# Patient Record
Sex: FEMALE | Race: WHITE | Employment: OTHER | URBAN - METROPOLITAN AREA
[De-identification: names, ages, dates, MRNs, and addresses within clinical notes are randomized per-mention and may not be internally consistent; named-entity substitution may affect disease eponyms.]

---

## 2017-01-27 ENCOUNTER — TELEPHONE (OUTPATIENT)
Dept: INTERNAL MEDICINE CLINIC | Facility: CLINIC | Age: 62
End: 2017-01-27

## 2017-01-27 RX ORDER — VENLAFAXINE HYDROCHLORIDE 37.5 MG/1
CAPSULE, EXTENDED RELEASE ORAL
Qty: 90 CAPSULE | Refills: 0 | Status: SHIPPED | OUTPATIENT
Start: 2017-01-27 | End: 2017-11-20

## 2017-01-27 RX ORDER — LEVOTHYROXINE SODIUM 0.05 MG/1
TABLET ORAL
Qty: 90 TABLET | Refills: 0 | Status: SHIPPED | OUTPATIENT
Start: 2017-01-27 | End: 2017-05-30 | Stop reason: ALTCHOICE

## 2017-01-27 RX ORDER — VENLAFAXINE HYDROCHLORIDE 75 MG/1
CAPSULE, EXTENDED RELEASE ORAL
Qty: 90 CAPSULE | Refills: 0 | Status: SHIPPED | OUTPATIENT
Start: 2017-01-27 | End: 2017-11-20

## 2017-01-27 RX ORDER — METOPROLOL TARTRATE 50 MG/1
TABLET, FILM COATED ORAL
Qty: 90 TABLET | Refills: 0 | Status: SHIPPED | OUTPATIENT
Start: 2017-01-27 | End: 2017-08-12

## 2017-02-02 NOTE — TELEPHONE ENCOUNTER
Letter mailed to pt tcb to review the PCP to be changed to TB.   Letter included reminder to do the labs ordered 11/2016

## 2017-05-01 RX ORDER — LEVOTHYROXINE SODIUM 0.05 MG/1
TABLET ORAL
Qty: 90 TABLET | Refills: 0 | OUTPATIENT
Start: 2017-05-01

## 2017-05-01 NOTE — TELEPHONE ENCOUNTER
Per protocol; failed - route to provider   Patient's last TSH abnormal range   Orders to recheck have been placed.

## 2017-05-30 ENCOUNTER — OFFICE VISIT (OUTPATIENT)
Dept: INTERNAL MEDICINE CLINIC | Facility: CLINIC | Age: 62
End: 2017-05-30

## 2017-05-30 VITALS
RESPIRATION RATE: 16 BRPM | TEMPERATURE: 98 F | HEART RATE: 64 BPM | BODY MASS INDEX: 31.34 KG/M2 | HEIGHT: 66 IN | DIASTOLIC BLOOD PRESSURE: 72 MMHG | WEIGHT: 195 LBS | SYSTOLIC BLOOD PRESSURE: 120 MMHG

## 2017-05-30 DIAGNOSIS — R05.9 COUGH: ICD-10-CM

## 2017-05-30 DIAGNOSIS — J06.9 URI, ACUTE: Primary | ICD-10-CM

## 2017-05-30 PROCEDURE — 99213 OFFICE O/P EST LOW 20 MIN: CPT | Performed by: INTERNAL MEDICINE

## 2017-05-30 RX ORDER — CODEINE PHOSPHATE AND GUAIFENESIN 10; 100 MG/5ML; MG/5ML
10 SOLUTION ORAL NIGHTLY PRN
Qty: 236 ML | Refills: 0 | Status: SHIPPED | OUTPATIENT
Start: 2017-05-30 | End: 2017-06-09

## 2017-05-30 RX ORDER — AZITHROMYCIN 250 MG/1
TABLET, FILM COATED ORAL
Qty: 6 TABLET | Refills: 0 | Status: SHIPPED | OUTPATIENT
Start: 2017-05-30 | End: 2017-11-20

## 2017-05-30 NOTE — PROGRESS NOTES
Nehemias Page is a 57year old female.   Patient presents with:  Cough: x 2 weeks ago pt. stated throat hurts when coughing      HPI:     Patient c/o cough, congestion, throat (muscle pain from coughing) for 2 weeks now. +allergies (sees allergist and go Aspirin 81 MG Oral Tab Take 81 mg by mouth daily.  Disp:  Rfl:       Past Medical History   Diagnosis Date   • Obesity    • Menopausal symptoms    • Depression      resolved   • Plantar fasciitis      left   • Inguinal hernia 1998     OR   • HTN (hyperten Wt 195 lb  BMI 31.49 kg/m2  GENERAL: well developed, well nourished,in no apparent distress  SKIN: no rashes,no suspicious lesions  HEENT: atraumatic, normocephalic,TMs and OP +erythema  NECK: supple,no adenopathy  LUNGS: normal rate without respiratory di

## 2017-07-24 RX ORDER — NAPROXEN 500 MG/1
TABLET ORAL
Qty: 60 TABLET | Refills: 0 | Status: SHIPPED | OUTPATIENT
Start: 2017-07-24 | End: 2020-10-14

## 2017-08-14 RX ORDER — METOPROLOL TARTRATE 50 MG/1
TABLET, FILM COATED ORAL
Qty: 90 TABLET | Refills: 0 | Status: SHIPPED | OUTPATIENT
Start: 2017-08-14 | End: 2017-11-20

## 2017-10-17 ENCOUNTER — TELEPHONE (OUTPATIENT)
Dept: INTERNAL MEDICINE CLINIC | Facility: CLINIC | Age: 62
End: 2017-10-17

## 2017-10-17 DIAGNOSIS — Z13.220 SCREENING FOR LIPOID DISORDERS: ICD-10-CM

## 2017-10-17 DIAGNOSIS — Z13.29 SCREENING FOR ENDOCRINE, NUTRITIONAL, METABOLIC AND IMMUNITY DISORDER: ICD-10-CM

## 2017-10-17 DIAGNOSIS — Z00.00 ROUTINE GENERAL MEDICAL EXAMINATION AT A HEALTH CARE FACILITY: ICD-10-CM

## 2017-10-17 DIAGNOSIS — Z13.0 SCREENING FOR BLOOD DISEASE: ICD-10-CM

## 2017-10-17 DIAGNOSIS — Z13.0 SCREENING FOR ENDOCRINE, NUTRITIONAL, METABOLIC AND IMMUNITY DISORDER: ICD-10-CM

## 2017-10-17 DIAGNOSIS — Z13.228 SCREENING FOR ENDOCRINE, NUTRITIONAL, METABOLIC AND IMMUNITY DISORDER: ICD-10-CM

## 2017-10-17 DIAGNOSIS — Z13.21 SCREENING FOR ENDOCRINE, NUTRITIONAL, METABOLIC AND IMMUNITY DISORDER: ICD-10-CM

## 2017-10-17 DIAGNOSIS — Z12.39 SCREENING FOR MALIGNANT NEOPLASM OF BREAST: Primary | ICD-10-CM

## 2017-10-17 DIAGNOSIS — Z13.29 SCREENING FOR THYROID DISORDER: ICD-10-CM

## 2017-10-17 NOTE — TELEPHONE ENCOUNTER
Pt also needs a mammo order. No call back is required. Pt will call CS in a few days from now to schedule.

## 2017-10-17 NOTE — TELEPHONE ENCOUNTER
CPE Future Appointments  Date Time Provider Radha Willoughby   11/20/2017 9:30 AM Wagner Rios MD [facility]     Orders to  Katiana Grad  aware must fast no call back required

## 2017-10-18 ENCOUNTER — IMMUNIZATION (OUTPATIENT)
Dept: INTERNAL MEDICINE CLINIC | Facility: CLINIC | Age: 62
End: 2017-10-18

## 2017-10-18 DIAGNOSIS — Z23 NEED FOR VACCINATION: ICD-10-CM

## 2017-10-18 PROCEDURE — 90471 IMMUNIZATION ADMIN: CPT | Performed by: INTERNAL MEDICINE

## 2017-10-18 PROCEDURE — 90686 IIV4 VACC NO PRSV 0.5 ML IM: CPT | Performed by: INTERNAL MEDICINE

## 2017-11-13 ENCOUNTER — LAB ENCOUNTER (OUTPATIENT)
Dept: LAB | Age: 62
End: 2017-11-13
Attending: INTERNAL MEDICINE
Payer: COMMERCIAL

## 2017-11-13 DIAGNOSIS — Z13.21 SCREENING FOR ENDOCRINE, NUTRITIONAL, METABOLIC AND IMMUNITY DISORDER: ICD-10-CM

## 2017-11-13 DIAGNOSIS — Z13.29 SCREENING FOR ENDOCRINE, NUTRITIONAL, METABOLIC AND IMMUNITY DISORDER: ICD-10-CM

## 2017-11-13 DIAGNOSIS — Z13.220 SCREENING FOR LIPOID DISORDERS: ICD-10-CM

## 2017-11-13 DIAGNOSIS — Z13.29 SCREENING FOR THYROID DISORDER: ICD-10-CM

## 2017-11-13 DIAGNOSIS — Z00.00 ROUTINE GENERAL MEDICAL EXAMINATION AT A HEALTH CARE FACILITY: ICD-10-CM

## 2017-11-13 DIAGNOSIS — Z13.228 SCREENING FOR ENDOCRINE, NUTRITIONAL, METABOLIC AND IMMUNITY DISORDER: ICD-10-CM

## 2017-11-13 DIAGNOSIS — Z13.0 SCREENING FOR ENDOCRINE, NUTRITIONAL, METABOLIC AND IMMUNITY DISORDER: ICD-10-CM

## 2017-11-13 DIAGNOSIS — Z13.0 SCREENING FOR BLOOD DISEASE: ICD-10-CM

## 2017-11-13 PROCEDURE — 84443 ASSAY THYROID STIM HORMONE: CPT

## 2017-11-13 PROCEDURE — 80061 LIPID PANEL: CPT

## 2017-11-13 PROCEDURE — 80053 COMPREHEN METABOLIC PANEL: CPT

## 2017-11-13 PROCEDURE — 85025 COMPLETE CBC W/AUTO DIFF WBC: CPT

## 2017-11-16 ENCOUNTER — HOSPITAL ENCOUNTER (OUTPATIENT)
Dept: MAMMOGRAPHY | Age: 62
Discharge: HOME | End: 2017-11-16
Attending: INTERNAL MEDICINE
Payer: COMMERCIAL

## 2017-11-16 DIAGNOSIS — Z12.39 SCREENING FOR MALIGNANT NEOPLASM OF BREAST: ICD-10-CM

## 2017-11-16 PROCEDURE — 77067 SCR MAMMO BI INCL CAD: CPT | Performed by: INTERNAL MEDICINE

## 2017-11-20 ENCOUNTER — OFFICE VISIT (OUTPATIENT)
Dept: INTERNAL MEDICINE CLINIC | Facility: CLINIC | Age: 62
End: 2017-11-20

## 2017-11-20 VITALS
RESPIRATION RATE: 16 BRPM | HEIGHT: 66 IN | WEIGHT: 187 LBS | HEART RATE: 68 BPM | TEMPERATURE: 98 F | SYSTOLIC BLOOD PRESSURE: 124 MMHG | DIASTOLIC BLOOD PRESSURE: 70 MMHG | BODY MASS INDEX: 30.05 KG/M2

## 2017-11-20 DIAGNOSIS — E03.9 HYPOTHYROIDISM, UNSPECIFIED TYPE: ICD-10-CM

## 2017-11-20 DIAGNOSIS — R21 RASH: ICD-10-CM

## 2017-11-20 DIAGNOSIS — N39.3 STRESS INCONTINENCE: ICD-10-CM

## 2017-11-20 DIAGNOSIS — R23.2 HOT FLASHES: ICD-10-CM

## 2017-11-20 DIAGNOSIS — Z12.4 SCREENING FOR MALIGNANT NEOPLASM OF CERVIX: ICD-10-CM

## 2017-11-20 DIAGNOSIS — I10 ESSENTIAL HYPERTENSION: ICD-10-CM

## 2017-11-20 DIAGNOSIS — E78.5 DYSLIPIDEMIA: ICD-10-CM

## 2017-11-20 DIAGNOSIS — Z00.00 ROUTINE GENERAL MEDICAL EXAMINATION AT A HEALTH CARE FACILITY: Primary | ICD-10-CM

## 2017-11-20 DIAGNOSIS — Z11.51 SCREENING FOR HPV (HUMAN PAPILLOMAVIRUS): ICD-10-CM

## 2017-11-20 DIAGNOSIS — Z88.9 MULTIPLE ALLERGIES: ICD-10-CM

## 2017-11-20 PROCEDURE — 87624 HPV HI-RISK TYP POOLED RSLT: CPT | Performed by: INTERNAL MEDICINE

## 2017-11-20 PROCEDURE — 88175 CYTOPATH C/V AUTO FLUID REDO: CPT | Performed by: INTERNAL MEDICINE

## 2017-11-20 PROCEDURE — 99396 PREV VISIT EST AGE 40-64: CPT | Performed by: INTERNAL MEDICINE

## 2017-11-20 RX ORDER — VENLAFAXINE HYDROCHLORIDE 37.5 MG/1
37.5 CAPSULE, EXTENDED RELEASE ORAL DAILY
Qty: 90 CAPSULE | Refills: 3 | Status: SHIPPED | OUTPATIENT
Start: 2017-11-20 | End: 2018-11-07 | Stop reason: DRUGHIGH

## 2017-11-20 RX ORDER — LEVOTHYROXINE SODIUM 0.07 MG/1
75 TABLET ORAL
Qty: 90 TABLET | Refills: 3 | Status: SHIPPED | OUTPATIENT
Start: 2017-11-20 | End: 2018-10-24

## 2017-11-20 RX ORDER — LOVASTATIN 40 MG/1
TABLET ORAL
Qty: 180 TABLET | Refills: 3 | Status: SHIPPED | OUTPATIENT
Start: 2017-11-20 | End: 2018-10-24

## 2017-11-20 RX ORDER — VENLAFAXINE HYDROCHLORIDE 75 MG/1
CAPSULE, EXTENDED RELEASE ORAL
Qty: 90 CAPSULE | Refills: 3 | Status: SHIPPED | OUTPATIENT
Start: 2017-11-20 | End: 2018-10-24

## 2017-11-20 RX ORDER — METOPROLOL TARTRATE 50 MG/1
TABLET, FILM COATED ORAL
Qty: 90 TABLET | Refills: 3 | Status: SHIPPED | OUTPATIENT
Start: 2017-11-20 | End: 2018-10-24

## 2017-11-20 NOTE — PROGRESS NOTES
Patient presents with:  Physical: pap due ROOM 4      HPI:    Patient here for cpe.    Pap normal 2 years ago, wants to do her last pap today   Effexor helping with hotflashes, refills needed  HTN - controlled on metoprolol, no CP/HA  Hypothyroidism - inten Plantar fasciitis     left   • Prediabetes    • Right knee meniscal tear 2009    OR-Goddard   • Tendonitis of ankle, left      Past Surgical History:  2002: COLONOSCOPY  6/28/2013: COLONOSCOPY      Comment: Procedure: COLONOSCOPY;  Surgeon: Basim Evans, Oral Capsule SR 24 Hr TAKE 1 CAPSULE BY MOUTH EVERY MORNING Disp: 90 capsule Rfl: 3   NAPROXEN 500 MG Oral Tab TAKE 1 TABLET(500 MG) BY MOUTH TWICE DAILY WITH MEALS Disp: 60 tablet Rfl: 0   Omega-3 Fatty Acids (OMEGA 3 OR) Take  by mouth 2 (two) times leatha exhibits no motion tenderness and no discharge. Bilateral adnexum display no mass, no tenderness and no fullness. No breast swelling, tenderness, discharge or bleeding. No breast masses. No axillary lymphadenopathy. Neurological: Normal reflexes.  No cran

## 2018-02-02 ENCOUNTER — OFFICE VISIT (OUTPATIENT)
Dept: INTERNAL MEDICINE CLINIC | Facility: CLINIC | Age: 63
End: 2018-02-02

## 2018-02-02 VITALS
SYSTOLIC BLOOD PRESSURE: 122 MMHG | WEIGHT: 195 LBS | RESPIRATION RATE: 16 BRPM | DIASTOLIC BLOOD PRESSURE: 80 MMHG | TEMPERATURE: 98 F | HEART RATE: 70 BPM | BODY MASS INDEX: 30.61 KG/M2 | HEIGHT: 67 IN

## 2018-02-02 DIAGNOSIS — J06.9 ACUTE URI: Primary | ICD-10-CM

## 2018-02-02 DIAGNOSIS — R05.9 COUGH: ICD-10-CM

## 2018-02-02 PROCEDURE — 99213 OFFICE O/P EST LOW 20 MIN: CPT | Performed by: NURSE PRACTITIONER

## 2018-02-02 RX ORDER — CODEINE PHOSPHATE AND GUAIFENESIN 10; 100 MG/5ML; MG/5ML
10 SOLUTION ORAL NIGHTLY PRN
Qty: 240 ML | Refills: 0 | Status: SHIPPED | OUTPATIENT
Start: 2018-02-02 | End: 2018-05-02

## 2018-02-02 NOTE — PROGRESS NOTES
Vasiliy Quintero is a 70year old female. Patient presents with:  Cough: for 1 week, taking mucinex with no relief([id number])      HPI:   Presents with c/o cough, chest and sinus congestion. Started on Monday   Using mucinex which is not helping much.   Thuy right wrist     OR by    • HTN (hypertension)    • Hypothyroidism 7/2014   • Inguinal hernia 1998    OR   • Menopausal symptoms    • Obesity    • Plantar fasciitis     left   • Prediabetes    • Right knee meniscal tear 2009    OR-Goddard   • Tendon distress  HEENT: atraumatic, normocephalic,ears and throat are clear  Pharyngeal erythema without exudate. NECK: supple,no adenopathy,  LUNGS: normal rate without respiratory distress, harsh bs bilaterally  No wheezing.    CARDIO: RRR without murmur  PSYC

## 2018-04-06 ENCOUNTER — TELEPHONE (OUTPATIENT)
Dept: INTERNAL MEDICINE CLINIC | Facility: CLINIC | Age: 63
End: 2018-04-06

## 2018-05-02 PROCEDURE — 82785 ASSAY OF IGE: CPT | Performed by: PEDIATRICS

## 2018-05-02 PROCEDURE — 86003 ALLG SPEC IGE CRUDE XTRC EA: CPT | Performed by: PEDIATRICS

## 2018-05-02 PROCEDURE — 36415 COLL VENOUS BLD VENIPUNCTURE: CPT | Performed by: PEDIATRICS

## 2018-09-16 ENCOUNTER — OFFICE VISIT (OUTPATIENT)
Dept: FAMILY MEDICINE CLINIC | Facility: CLINIC | Age: 63
End: 2018-09-16
Payer: MEDICARE

## 2018-09-16 VITALS
WEIGHT: 185 LBS | SYSTOLIC BLOOD PRESSURE: 100 MMHG | OXYGEN SATURATION: 97 % | RESPIRATION RATE: 18 BRPM | HEIGHT: 66 IN | BODY MASS INDEX: 29.73 KG/M2 | DIASTOLIC BLOOD PRESSURE: 60 MMHG | TEMPERATURE: 99 F | HEART RATE: 78 BPM

## 2018-09-16 DIAGNOSIS — H00.022 HORDEOLUM INTERNUM OF RIGHT LOWER EYELID: Primary | ICD-10-CM

## 2018-09-16 PROCEDURE — 99213 OFFICE O/P EST LOW 20 MIN: CPT | Performed by: NURSE PRACTITIONER

## 2018-09-16 RX ORDER — TOBRAMYCIN 3 MG/ML
2 SOLUTION/ DROPS OPHTHALMIC EVERY 6 HOURS
Qty: 1 BOTTLE | Refills: 0 | Status: SHIPPED | OUTPATIENT
Start: 2018-09-16 | End: 2018-09-23

## 2018-09-16 NOTE — PATIENT INSTRUCTIONS
Conjunctivitis, Nonspecific    The membrane that covers the white part of your eye (the conjunctiva) is inflamed. Inflammation happens when your body responds to an injury, allergic reaction, infection, or illness.  Symptoms of inflammation in the eye may A sty is an infection of the oil gland of the eyelid. It may develop into a small pocket of pus (an abscess). This can cause pain, redness, and swelling.  In early stages, a sty is treated with antibiotic cream, eye drops, or a small towel soaked in warm wa © 3570-1995 [employer]. [address] [facility], [facility] [place]. All rights reserved. This information is not intended as a substitute for professional medical care. Always follow your healthcare professional's instructions.

## 2018-09-16 NOTE — PROGRESS NOTES
CHIEF COMPLAINT:   Patient presents with:  Conjunctivitis: jovita eye redness, itchy and swelling x 3 days and congestion couple of months       HPI:   Roselyn Bee is a 70year old female who presents with chief complaint of eye irritation.  Symptoms bega • Tendonitis of ankle, left       Past Surgical History:  2002: COLONOSCOPY  6/28/2013: COLONOSCOPY      Comment:  Procedure: COLONOSCOPY;  Surgeon: Tiffanie Rodríguez MD;                 Location: [address] [place] [address] ENDOSCOPY  6/28/2013: COLONOSCOPY; N/A      Comment:  Per /60 (BP Location: Right arm, Patient Position: Sitting, Cuff Size: adult)   Pulse 78   Temp 98.7 °F (37.1 °C) (Oral)   Resp 18   Ht 66\"   Wt 185 lb   SpO2 97%   BMI 29.86 kg/m²   GENERAL: well developed, well nourished,in no apparent distress  SKIN: The membrane that covers the white part of your eye (the conjunctiva) is inflamed. Inflammation happens when your body responds to an injury, allergic reaction, infection, or illness.  Symptoms of inflammation in the eye may include redness, irritation, itc A sty is an infection of the oil gland of the eyelid. It may develop into a small pocket of pus (an abscess). This can cause pain, redness, and swelling.  In early stages, a sty is treated with antibiotic cream, eye drops, or a small towel soaked in warm wa © 7218-1224 [employer] [address]. 1407 [facility], [facility] [place]. All rights reserved. This information is not intended as a substitute for professional medical care. Always follow your healthcare professional's instructions.

## 2018-09-21 ENCOUNTER — TELEPHONE (OUTPATIENT)
Dept: INTERNAL MEDICINE CLINIC | Facility: CLINIC | Age: 63
End: 2018-09-21

## 2018-09-21 ENCOUNTER — OFFICE VISIT (OUTPATIENT)
Dept: INTERNAL MEDICINE CLINIC | Facility: CLINIC | Age: 63
End: 2018-09-21
Payer: MEDICARE

## 2018-09-21 VITALS
HEART RATE: 61 BPM | SYSTOLIC BLOOD PRESSURE: 118 MMHG | OXYGEN SATURATION: 98 % | DIASTOLIC BLOOD PRESSURE: 76 MMHG | WEIGHT: 186 LBS | HEIGHT: 66 IN | BODY MASS INDEX: 29.89 KG/M2 | TEMPERATURE: 98 F

## 2018-09-21 DIAGNOSIS — I10 ESSENTIAL HYPERTENSION: ICD-10-CM

## 2018-09-21 DIAGNOSIS — J06.9 ACUTE URI: Primary | ICD-10-CM

## 2018-09-21 DIAGNOSIS — E03.9 HYPOTHYROIDISM, UNSPECIFIED TYPE: ICD-10-CM

## 2018-09-21 DIAGNOSIS — E78.5 DYSLIPIDEMIA: ICD-10-CM

## 2018-09-21 DIAGNOSIS — R05.9 COUGH: ICD-10-CM

## 2018-09-21 PROCEDURE — 99213 OFFICE O/P EST LOW 20 MIN: CPT | Performed by: NURSE PRACTITIONER

## 2018-09-21 RX ORDER — AZITHROMYCIN 250 MG/1
TABLET, FILM COATED ORAL
Qty: 6 TABLET | Refills: 0 | Status: SHIPPED | OUTPATIENT
Start: 2018-09-21 | End: 2018-10-01

## 2018-09-21 NOTE — TELEPHONE ENCOUNTER
Future Appointments   Date Time Provider Radha Gonzalezi   10/18/2018  1:15 PM Jameel oMrrow MD [facility]     Patient has scheduled a f/u on medication with TB and she will need to have lab orders placed with Bart Velez Dr.   Patient will be fast

## 2018-09-21 NOTE — PROGRESS NOTES
Yin Cabello is a 70year old female. Patient presents with:  Chest Congestion: x 2 weeks  Cough  Wheezing  Ear Drainage  Post Nasal Drip      HPI:   Presents for eval of cough and chest congestion. Started about 2 weeks ago.  Increased SOB and wheezi Comment:  resolved  No date: Dyslipidemia  No date: Family history of heart disease  No date: Ganglion of right wrist      Comment:  OR by   No date: HTN (hypertension)  7/2014: Hypothyroidism  1998: Inguinal hernia      Comment:  OR  No date: Men Location: Left arm, Patient Position: Sitting, Cuff Size: adult)   Pulse 61   Temp 97.7 °F (36.5 °C) (Oral)   Ht 66\"   Wt 186 lb   SpO2 98%   BMI 30.02 kg/m²   GENERAL: well developed, well nourished,in no apparent distress  HEENT: atraumatic, normocephal

## 2018-10-01 ENCOUNTER — OFFICE VISIT (OUTPATIENT)
Dept: INTERNAL MEDICINE CLINIC | Facility: CLINIC | Age: 63
End: 2018-10-01
Payer: MEDICARE

## 2018-10-01 VITALS
HEIGHT: 66 IN | DIASTOLIC BLOOD PRESSURE: 86 MMHG | HEART RATE: 56 BPM | SYSTOLIC BLOOD PRESSURE: 124 MMHG | RESPIRATION RATE: 18 BRPM | TEMPERATURE: 98 F | BODY MASS INDEX: 30.31 KG/M2 | OXYGEN SATURATION: 99 % | WEIGHT: 188.63 LBS

## 2018-10-01 DIAGNOSIS — R05.9 COUGH: Primary | ICD-10-CM

## 2018-10-01 DIAGNOSIS — J98.01 BRONCHOSPASM, ACUTE: ICD-10-CM

## 2018-10-01 PROCEDURE — 99213 OFFICE O/P EST LOW 20 MIN: CPT | Performed by: NURSE PRACTITIONER

## 2018-10-01 RX ORDER — PREDNISONE 20 MG/1
TABLET ORAL
Qty: 15 TABLET | Refills: 0 | Status: SHIPPED | OUTPATIENT
Start: 2018-10-01 | End: 2019-02-28

## 2018-10-01 RX ORDER — BUDESONIDE AND FORMOTEROL FUMARATE DIHYDRATE 160; 4.5 UG/1; UG/1
2 AEROSOL RESPIRATORY (INHALATION) 2 TIMES DAILY
Qty: 1 INHALER | Refills: 0 | Status: SHIPPED | OUTPATIENT
Start: 2018-10-01 | End: 2018-10-25

## 2018-10-01 NOTE — PROGRESS NOTES
Naveen Brown is a 70year old female. Patient presents with:  Cough: cn room 10: cough and sore throat medicine did not work and pt is going out of town . HPI:   Presents for eval of cough and sore throat. Treated 9/21 with zithromax.   States History:   Diagnosis Date   • Chondromalacia of patella    • Colon polyps 4/2013    scope   • Depression     resolved   • Dyslipidemia    • Family history of heart disease    • Ganglion of right wrist     OR by    • HTN (hypertension)    • Hypothy diarrhea or constipation  MUSCULOSKELETAL:  No arthralgias or myalgias  NEURO: headaches,     EXAM:   /86 (BP Location: Right arm, Patient Position: Sitting, Cuff Size: adult)   Pulse 56   Temp 97.5 °F (36.4 °C) (Oral)   Resp 18   Ht 66\"   Wt 188 lb

## 2018-10-18 ENCOUNTER — LAB ENCOUNTER (OUTPATIENT)
Dept: LAB | Age: 63
End: 2018-10-18
Attending: NURSE PRACTITIONER
Payer: MEDICARE

## 2018-10-18 DIAGNOSIS — I10 ESSENTIAL HYPERTENSION: ICD-10-CM

## 2018-10-18 DIAGNOSIS — E78.5 DYSLIPIDEMIA: ICD-10-CM

## 2018-10-18 DIAGNOSIS — E03.9 HYPOTHYROIDISM, UNSPECIFIED TYPE: ICD-10-CM

## 2018-10-18 PROCEDURE — 84439 ASSAY OF FREE THYROXINE: CPT

## 2018-10-18 PROCEDURE — 85025 COMPLETE CBC W/AUTO DIFF WBC: CPT

## 2018-10-18 PROCEDURE — 80061 LIPID PANEL: CPT

## 2018-10-18 PROCEDURE — 84443 ASSAY THYROID STIM HORMONE: CPT

## 2018-10-18 PROCEDURE — 80053 COMPREHEN METABOLIC PANEL: CPT

## 2018-10-24 ENCOUNTER — OFFICE VISIT (OUTPATIENT)
Dept: INTERNAL MEDICINE CLINIC | Facility: CLINIC | Age: 63
End: 2018-10-24
Payer: MEDICARE

## 2018-10-24 VITALS
RESPIRATION RATE: 16 BRPM | DIASTOLIC BLOOD PRESSURE: 80 MMHG | SYSTOLIC BLOOD PRESSURE: 128 MMHG | WEIGHT: 185 LBS | TEMPERATURE: 98 F | OXYGEN SATURATION: 98 % | BODY MASS INDEX: 29.73 KG/M2 | HEIGHT: 66 IN | HEART RATE: 58 BPM

## 2018-10-24 DIAGNOSIS — Z12.31 ENCOUNTER FOR SCREENING MAMMOGRAM FOR MALIGNANT NEOPLASM OF BREAST: ICD-10-CM

## 2018-10-24 DIAGNOSIS — R23.2 HOT FLASHES: ICD-10-CM

## 2018-10-24 DIAGNOSIS — Z12.11 ENCOUNTER FOR SCREENING FOR MALIGNANT NEOPLASM OF COLON: ICD-10-CM

## 2018-10-24 DIAGNOSIS — I10 ESSENTIAL HYPERTENSION: ICD-10-CM

## 2018-10-24 DIAGNOSIS — E78.5 DYSLIPIDEMIA: Primary | ICD-10-CM

## 2018-10-24 DIAGNOSIS — Z80.3 FAMILY HISTORY OF BREAST CANCER: ICD-10-CM

## 2018-10-24 DIAGNOSIS — E03.9 HYPOTHYROIDISM, UNSPECIFIED TYPE: ICD-10-CM

## 2018-10-24 PROCEDURE — 90670 PCV13 VACCINE IM: CPT | Performed by: INTERNAL MEDICINE

## 2018-10-24 PROCEDURE — G0009 ADMIN PNEUMOCOCCAL VACCINE: HCPCS | Performed by: INTERNAL MEDICINE

## 2018-10-24 PROCEDURE — 99214 OFFICE O/P EST MOD 30 MIN: CPT | Performed by: INTERNAL MEDICINE

## 2018-10-24 RX ORDER — VENLAFAXINE HYDROCHLORIDE 75 MG/1
CAPSULE, EXTENDED RELEASE ORAL
Qty: 90 CAPSULE | Refills: 3 | Status: SHIPPED | OUTPATIENT
Start: 2018-10-24 | End: 2019-12-03 | Stop reason: ALTCHOICE

## 2018-10-24 RX ORDER — VENLAFAXINE HYDROCHLORIDE 150 MG/1
150 CAPSULE, EXTENDED RELEASE ORAL DAILY
Qty: 90 CAPSULE | Refills: 3 | Status: SHIPPED | OUTPATIENT
Start: 2018-10-24 | End: 2019-12-03 | Stop reason: ALTCHOICE

## 2018-10-24 RX ORDER — LEVOTHYROXINE SODIUM 0.07 MG/1
75 TABLET ORAL
Qty: 90 TABLET | Refills: 3 | Status: SHIPPED | OUTPATIENT
Start: 2018-10-24 | End: 2022-01-14

## 2018-10-24 RX ORDER — LOVASTATIN 40 MG/1
TABLET ORAL
Qty: 180 TABLET | Refills: 3 | Status: SHIPPED | OUTPATIENT
Start: 2018-10-24 | End: 2019-12-03 | Stop reason: ALTCHOICE

## 2018-10-24 RX ORDER — METOPROLOL TARTRATE 50 MG/1
TABLET, FILM COATED ORAL
Qty: 90 TABLET | Refills: 3 | Status: SHIPPED | OUTPATIENT
Start: 2018-10-24 | End: 2020-05-26

## 2018-10-24 NOTE — PROGRESS NOTES
Chip Wilson is a 70year old female. Patient presents with:   Follow - Up: angel is here for meds follow up, hot flashes not under control. htn, cholesterol, thyroid, lab review      HPI:     Patient here for med f/u  Hot flashes getting worse, woul NAPROXEN 500 MG Oral Tab TAKE 1 TABLET(500 MG) BY MOUTH TWICE DAILY WITH MEALS (Patient taking differently: TAKE 1 TABLET(500 MG) BY MOUTH TWICE DAILY prn WITH MEALS) Disp: 60 tablet Rfl: 0   Omega-3 Fatty Acids (OMEGA 3 OR) Take  by mouth 2 (two) times Allergies    Quinolones                  Comment:Achilles tendon problem  Sulfa Antibiotics       SWELLING      REVIEW OF SYSTEMS:   GENERAL HEALTH: no fevers or chills  SKIN: denies any unusual skin lesions or rashes  RESPIRATORY:  no cough  CARDIOVAS breakfast.   • Venlafaxine HCl ER 75 MG Oral Capsule SR 24 Hr 90 capsule 3     Sig: TAKE 1 CAPSULE BY MOUTH EVERY MORNING   • Venlafaxine HCl  MG Oral Capsule SR 24 Hr 90 capsule 3     Sig: Take 1 capsule (150 mg total) by mouth daily.        Imaging

## 2018-10-25 RX ORDER — BUDESONIDE AND FORMOTEROL FUMARATE DIHYDRATE 160; 4.5 UG/1; UG/1
AEROSOL RESPIRATORY (INHALATION)
Qty: 1 INHALER | Refills: 0 | Status: SHIPPED | OUTPATIENT
Start: 2018-10-25 | End: 2018-11-23

## 2018-10-29 ENCOUNTER — APPOINTMENT (OUTPATIENT)
Dept: GENERAL RADIOLOGY | Facility: HOSPITAL | Age: 63
End: 2018-10-29
Payer: MEDICARE

## 2018-10-29 ENCOUNTER — HOSPITAL ENCOUNTER (EMERGENCY)
Facility: HOSPITAL | Age: 63
Discharge: HOME | End: 2018-10-29
Attending: EMERGENCY MEDICINE
Payer: MEDICARE

## 2018-10-29 VITALS
RESPIRATION RATE: 16 BRPM | HEART RATE: 60 BPM | BODY MASS INDEX: 28.93 KG/M2 | HEIGHT: 66 IN | DIASTOLIC BLOOD PRESSURE: 86 MMHG | OXYGEN SATURATION: 94 % | WEIGHT: 180 LBS | TEMPERATURE: 98 F | SYSTOLIC BLOOD PRESSURE: 145 MMHG

## 2018-10-29 DIAGNOSIS — R07.81 RIB PAIN ON RIGHT SIDE: Primary | ICD-10-CM

## 2018-10-29 PROCEDURE — 99283 EMERGENCY DEPT VISIT LOW MDM: CPT

## 2018-10-29 PROCEDURE — 71101 X-RAY EXAM UNILAT RIBS/CHEST: CPT | Performed by: EMERGENCY MEDICINE

## 2018-10-29 NOTE — ED INITIAL ASSESSMENT (HPI)
Pt states on Saturday, she was reaching into a recycling bin and heard a \"pop\" on her right side. Pt has pain in rib area, worse with movement and breathing.

## 2018-10-30 NOTE — ED PROVIDER NOTES
Patient Seen in: [facility] Emergency Department    History   Patient presents with:  Trauma (cardiovascular, musculoskeletal)    Stated Complaint: Rt sided rib pain, hit on edge of garbage can    HPI    Patient is a 42-year-old female who complains are as noted in HPI. Constitutional and vital signs reviewed. All other systems reviewed and negative except as noted above.     Physical Exam     ED Triage Vitals [10/29/18 1722]   BP (!) 124/99   Pulse 72   Resp 16   Temp 97.9 °F (36.6 °C)   Temp sr MD  [address] [place] [address]  [phone]    In 3 days  As needed        Medications Prescribed:  Current Discharge Medication List

## 2018-11-02 ENCOUNTER — OFFICE VISIT (OUTPATIENT)
Dept: SURGERY | Facility: CLINIC | Age: 63
End: 2018-11-02

## 2018-11-02 VITALS
WEIGHT: 185 LBS | SYSTOLIC BLOOD PRESSURE: 134 MMHG | HEIGHT: 66 IN | TEMPERATURE: 98 F | BODY MASS INDEX: 29.73 KG/M2 | HEART RATE: 76 BPM | DIASTOLIC BLOOD PRESSURE: 89 MMHG

## 2018-11-02 DIAGNOSIS — Z86.010 PERSONAL HISTORY OF COLONIC POLYPS: ICD-10-CM

## 2018-11-02 DIAGNOSIS — I10 ESSENTIAL HYPERTENSION: ICD-10-CM

## 2018-11-02 DIAGNOSIS — Z12.11 SCREENING FOR MALIGNANT NEOPLASM OF COLON: Primary | ICD-10-CM

## 2018-11-02 PROBLEM — Z86.0100 PERSONAL HISTORY OF COLONIC POLYPS: Status: ACTIVE | Noted: 2018-11-02

## 2018-11-02 RX ORDER — POLYETHYLENE GLYCOL 3350, SODIUM CHLORIDE, SODIUM BICARBONATE, POTASSIUM CHLORIDE 420; 11.2; 5.72; 1.48 G/4L; G/4L; G/4L; G/4L
POWDER, FOR SOLUTION ORAL
Qty: 1 BOTTLE | Refills: 0 | Status: SHIPPED | OUTPATIENT
Start: 2018-11-02 | End: 2018-12-27

## 2018-11-02 RX ORDER — POLYETHYLENE GLYCOL 3350, SODIUM CHLORIDE, SODIUM BICARBONATE, POTASSIUM CHLORIDE 420; 11.2; 5.72; 1.48 G/4L; G/4L; G/4L; G/4L
POWDER, FOR SOLUTION ORAL
Qty: 1 BOTTLE | Refills: 0 | Status: CANCELLED | OUTPATIENT
Start: 2018-11-02

## 2018-11-02 NOTE — H&P
New Patient Visit Note       Active Problems      1. Screening for malignant neoplasm of colon    2. Personal history of colonic polyps    3.  Essential hypertension        Chief Complaint   Patient presents with:  Colonoscopy: per pt had 2 colonoscopy done removed arm   • Other (heart diseases) Brother    • Heart Disease Brother    • High Blood Pressure Brother    • Cancer Brother 79        scalp cancerous growth removed   • Other (skin cancer) Brother    • Diabetes Father    • Other (Other) Father    • Alessia 4:00 pm the night before procedure, drink 8 ounces of the prep every 15-20 minutes until finished Disp: 1 Bottle Rfl: 0   SYMBICORT 160-4.5 MCG/ACT Inhalation Aerosol INHALE 2 PUFFS BY MOUTH INTO THE LUNGS TWICE DAILY Disp: 1 Inhaler Rfl: 0   Metoprolol Ta tremors, syncope and weakness. Hematological: Negative for adenopathy. Does not bruise/bleed easily. Psychiatric/Behavioral: Negative for behavioral problems and sleep disturbance.        Physical Findings   /89   Pulse 76   Temp 98 °F (36.7 °C) (

## 2018-11-06 ENCOUNTER — GENETICS ENCOUNTER (OUTPATIENT)
Dept: GENETICS | Facility: HOSPITAL | Age: 63
End: 2018-11-06
Attending: GENETIC COUNSELOR, MS
Payer: MEDICARE

## 2018-11-06 DIAGNOSIS — R23.2 HOT FLASHES: ICD-10-CM

## 2018-11-06 DIAGNOSIS — Z80.3 FAMILY HISTORY OF BREAST CANCER: Primary | ICD-10-CM

## 2018-11-06 PROCEDURE — 96040 HC GENETIC COUNSELING EA 30 MIN: CPT | Performed by: GENETIC COUNSELOR, MS

## 2018-11-06 NOTE — TELEPHONE ENCOUNTER
Called patient to confirm medication dosage due to 3 doses listed left voicemail to call back and clarify dosage .

## 2018-11-07 RX ORDER — VENLAFAXINE HYDROCHLORIDE 37.5 MG/1
CAPSULE, EXTENDED RELEASE ORAL
Qty: 90 CAPSULE | Refills: 0 | OUTPATIENT
Start: 2018-11-07

## 2018-11-07 NOTE — PROGRESS NOTES
Referring Provider: Dr. Lord Arteaga     Reason for Referral:  Ms. Mahsa Woodruff was referred for genetic counseling because of a family history of breast cancer. Ms. Rolin Pallas is a 70year-old woman of [facility], and [place] descent.       Medical Ovarian Cancer: In the [place], approximately 1 in 8 women (12%) will develop breast cancer. The vast majority (75-85%) of breast cancer cases are sporadic. Approximately 5-10% of women with breast cancer have a hereditary cancer syndrome.   Jatinder Trotter She indicated she approached her cousin about testing but her cousin is unable to do pursue testing at this time. Consequently, genetic testing for Ms. Vandana Green is indicated.   If genetic testing is negative in Ms. Vandana Green, options for cancer screening/ma for a panel of genes associated with hereditary breast cancer. Because of the billing process and Medicare insurance, we elected to pursue genetic testing through [facility].           Genetic testing on MsTim Linda Ceronolic for [facility]’s Breast and Gyn Panel includi

## 2018-11-16 ENCOUNTER — GENETICS ENCOUNTER (OUTPATIENT)
Dept: HEMATOLOGY/ONCOLOGY | Facility: HOSPITAL | Age: 63
End: 2018-11-16

## 2018-11-16 NOTE — PROGRESS NOTES
Referring Provider: Dr. Kam Clay     Reason for Referral:  Ms. Jaden Green had [facility]’s Breast and Gyn Cancers panel genetic testing performed on 11/06/18 because of a family history of breast cancer.        Genetic Testing Result:  No known pathog [facility] could be considered. Ms. Celeste Kendrick should contact me on an annual basis to see if there have been any updates in genetic testing that would apply to her or to inform me if there are any changes to the family history.   I reviewed with

## 2018-11-24 RX ORDER — BUDESONIDE AND FORMOTEROL FUMARATE DIHYDRATE 160; 4.5 UG/1; UG/1
AEROSOL RESPIRATORY (INHALATION)
Qty: 1 INHALER | Refills: 0 | Status: SHIPPED | OUTPATIENT
Start: 2018-11-24 | End: 2018-12-20

## 2018-11-24 NOTE — TELEPHONE ENCOUNTER
Last Office Visit: 10-24-18 with TB for follow up  Last Rx Filled: 10-25-18 1 inhaler with no refills   Last Labs: 10-18-18 tsh/t4/lipid/cbc/cmp  Future Appointment: 12-27-18     Per protocol to provider     Patient doesn't have asthma

## 2018-12-20 NOTE — TELEPHONE ENCOUNTER
Patient does not have asthma    Please advise,    LOV:10/24/18 TB  FOV:12/27/18  LAST RX:11/24/18 inhale 2 puffs twice a day 1 inhaler 0 refills   LAST LABS:10/18/18 tsh free t4,lipids,cmp,cbc  PER PROTOCOL: to provider

## 2018-12-21 RX ORDER — BUDESONIDE AND FORMOTEROL FUMARATE DIHYDRATE 160; 4.5 UG/1; UG/1
AEROSOL RESPIRATORY (INHALATION)
Qty: 1 INHALER | Refills: 0 | Status: SHIPPED | OUTPATIENT
Start: 2018-12-21 | End: 2019-01-14

## 2018-12-27 ENCOUNTER — OFFICE VISIT (OUTPATIENT)
Dept: INTERNAL MEDICINE CLINIC | Facility: CLINIC | Age: 63
End: 2018-12-27
Payer: MEDICARE

## 2018-12-27 VITALS
DIASTOLIC BLOOD PRESSURE: 84 MMHG | HEIGHT: 66 IN | HEART RATE: 68 BPM | SYSTOLIC BLOOD PRESSURE: 122 MMHG | BODY MASS INDEX: 29.57 KG/M2 | RESPIRATION RATE: 14 BRPM | WEIGHT: 184 LBS | TEMPERATURE: 98 F

## 2018-12-27 DIAGNOSIS — Z12.83 SCREENING FOR MALIGNANT NEOPLASM OF SKIN: ICD-10-CM

## 2018-12-27 DIAGNOSIS — H91.93 DECREASED HEARING OF BOTH EARS: ICD-10-CM

## 2018-12-27 DIAGNOSIS — Z78.0 POST-MENOPAUSAL: ICD-10-CM

## 2018-12-27 DIAGNOSIS — Z00.00 ROUTINE GENERAL MEDICAL EXAMINATION AT A HEALTH CARE FACILITY: Primary | ICD-10-CM

## 2018-12-27 PROCEDURE — 99397 PER PM REEVAL EST PAT 65+ YR: CPT | Performed by: INTERNAL MEDICINE

## 2018-12-27 NOTE — PROGRESS NOTES
Patient presents with:  Physical: LB-rm 3      HPI:    Patient here for cpe  Hot flashes manageable with Effexor  HTN - controlled  HL- fair control on statin, pt working on her diet  utd on cscope, she will schedule mammo soon  C/o decreased hearing for m    • HTN (hypertension)    • Hypothyroidism 7/2014   • Inguinal hernia 1998    OR   • Menopausal symptoms    • Obesity    • Plantar fasciitis     left   • Prediabetes    • Right knee meniscal tear 2009    OR-Goddard   • Tendonitis of ankle, left DAILY Disp: 90 tablet Rfl: 3   Lovastatin 40 MG Oral Tab 2 tabs po nightly Disp: 180 tablet Rfl: 3   Levothyroxine Sodium 75 MCG Oral Tab Take 1 tablet (75 mcg total) by mouth before breakfast. Disp: 90 tablet Rfl: 3   Venlafaxine HCl ER 75 MG Oral Capsule Neck: Normal range of motion. Neck supple. Normal carotid pulses and no JVD present. No edema present. No mass and no thyromegaly present. Cardiovascular: Normal rate, regular rhythm and intact distal pulses. No murmur, rubs or gallops.    Pulmonary/Ch

## 2019-01-14 RX ORDER — BUDESONIDE AND FORMOTEROL FUMARATE DIHYDRATE 160; 4.5 UG/1; UG/1
AEROSOL RESPIRATORY (INHALATION)
Qty: 1 INHALER | Refills: 0 | Status: SHIPPED | OUTPATIENT
Start: 2019-01-14 | End: 2019-03-05

## 2019-01-14 NOTE — TELEPHONE ENCOUNTER
Did not pass protocol- pt due for AAP/ACT  Pt does not have Asthma    LOV: 12/27/18 w/ TB for CPE  FOV: None  Last labs: 10/18/18 TSH,Free T4,LIPID,CMP,CBC  Last Refill: 12/21/18 qt:1 inhaler    Per protocol sent to provider

## 2019-01-15 NOTE — TELEPHONE ENCOUNTER
Refill given, ask to come in for evaluation if still struggling with cough  SD gave this in Oct for acute bronchospasm

## 2019-02-06 ENCOUNTER — OFFICE VISIT (OUTPATIENT)
Dept: INTERNAL MEDICINE CLINIC | Facility: CLINIC | Age: 64
End: 2019-02-06
Payer: MEDICARE

## 2019-02-06 VITALS
DIASTOLIC BLOOD PRESSURE: 70 MMHG | TEMPERATURE: 98 F | HEIGHT: 66 IN | WEIGHT: 181.81 LBS | RESPIRATION RATE: 16 BRPM | SYSTOLIC BLOOD PRESSURE: 120 MMHG | HEART RATE: 64 BPM | BODY MASS INDEX: 29.22 KG/M2

## 2019-02-06 DIAGNOSIS — R05.9 COUGH: ICD-10-CM

## 2019-02-06 DIAGNOSIS — J34.89 SINUS PAIN: ICD-10-CM

## 2019-02-06 DIAGNOSIS — J06.9 URI, ACUTE: Primary | ICD-10-CM

## 2019-02-06 PROCEDURE — 99213 OFFICE O/P EST LOW 20 MIN: CPT | Performed by: INTERNAL MEDICINE

## 2019-02-06 RX ORDER — ALBUTEROL SULFATE 90 UG/1
2 AEROSOL, METERED RESPIRATORY (INHALATION) EVERY 4 HOURS PRN
Qty: 1 INHALER | Refills: 1 | Status: SHIPPED | OUTPATIENT
Start: 2019-02-06 | End: 2021-06-09 | Stop reason: CLARIF

## 2019-02-06 RX ORDER — LEVOFLOXACIN 500 MG/1
500 TABLET, FILM COATED ORAL DAILY
Qty: 10 TABLET | Refills: 0 | Status: SHIPPED | OUTPATIENT
Start: 2019-02-06 | End: 2019-02-16

## 2019-02-06 NOTE — PROGRESS NOTES
Carine Husain is a 75year old female. Patient presents with:  Cough: Room 4 TO pt c/o cough w/flem      HPI:     C/o cough for several months now. Worsened in the last few weeks. It use to be dry, now becoming productive.  Symbicort helps (given for b MEALS) Disp: 60 tablet Rfl: 0   Omega-3 Fatty Acids (OMEGA 3 OR) Take  by mouth 2 (two) times daily.  Disp:  Rfl:       Past Medical History:   Diagnosis Date   • Chondromalacia of patella    • Colon polyps 4/2013    scope   • Depression     resolved   • Dy rashes  RESPIRATORY: + cough  CARDIOVASCULAR: denies chest pain  GI: denies abdominal pain  : no dysuria  NEURO: denies headaches       EXAM:   /70 (BP Location: Right arm, Patient Position: Sitting, Cuff Size: adult)   Pulse 64   Temp 98 °F (36.7

## 2019-02-28 ENCOUNTER — OFFICE VISIT (OUTPATIENT)
Dept: INTERNAL MEDICINE CLINIC | Facility: CLINIC | Age: 64
End: 2019-02-28
Payer: MEDICARE

## 2019-02-28 VITALS
BODY MASS INDEX: 31.34 KG/M2 | HEART RATE: 96 BPM | WEIGHT: 195 LBS | TEMPERATURE: 98 F | OXYGEN SATURATION: 94 % | DIASTOLIC BLOOD PRESSURE: 80 MMHG | HEIGHT: 66 IN | RESPIRATION RATE: 16 BRPM | SYSTOLIC BLOOD PRESSURE: 132 MMHG

## 2019-02-28 DIAGNOSIS — Z12.83 SCREENING FOR MALIGNANT NEOPLASM OF THE SKIN: ICD-10-CM

## 2019-02-28 DIAGNOSIS — R05.3 PERSISTENT COUGH: Primary | ICD-10-CM

## 2019-02-28 DIAGNOSIS — N39.3 STRESS INCONTINENCE: ICD-10-CM

## 2019-02-28 PROCEDURE — 99213 OFFICE O/P EST LOW 20 MIN: CPT | Performed by: INTERNAL MEDICINE

## 2019-02-28 RX ORDER — PREDNISONE 20 MG/1
TABLET ORAL
Qty: 15 TABLET | Refills: 0 | Status: SHIPPED | OUTPATIENT
Start: 2019-02-28 | End: 2019-03-10

## 2019-02-28 NOTE — PROGRESS NOTES
Yin Cabello is a 75year old female. Patient presents with:  Cough: Pt continues to have persistent cough and vertigo. LB-rm 3      HPI:     Patient c/o persistent cough even after the levaquin course.  She was better for a few days but after that, th daily. Disp: 90 capsule Rfl: 3   Omega-3 Fatty Acids (OMEGA 3 OR) Take  by mouth 2 (two) times daily.  Disp:  Rfl:    NAPROXEN 500 MG Oral Tab TAKE 1 TABLET(500 MG) BY MOUTH TWICE DAILY WITH MEALS (Patient taking differently: TAKE 1 TABLET(500 MG) BY MOUTH vein surgery legs        Allergies    Sulfa Antibiotics       SWELLING      REVIEW OF SYSTEMS:   GENERAL HEALTH:  no fevers or chills  SKIN: denies any unusual skin lesions or rashes  RESPIRATORY: + cough  CARDIOVASCULAR: denies chest pain  GI: den

## 2019-03-05 RX ORDER — BUDESONIDE AND FORMOTEROL FUMARATE DIHYDRATE 160; 4.5 UG/1; UG/1
AEROSOL RESPIRATORY (INHALATION)
Qty: 1 INHALER | Refills: 0 | Status: SHIPPED | OUTPATIENT
Start: 2019-03-05 | End: 2019-03-31

## 2019-03-05 NOTE — TELEPHONE ENCOUNTER
Patient doesn't have asthma or COPD    Last Office Visit: 2-28-19 with TB for cough  Last Rx Filled: 1-14-19 1 inhaler with no refills   Last Labs: 10-18-18 tsh/t4/lipid/cmp/cbc  Future Appointment: none    Per protocol to provider

## 2019-03-11 ENCOUNTER — HOSPITAL ENCOUNTER (OUTPATIENT)
Dept: MAMMOGRAPHY | Age: 64
Discharge: HOME | End: 2019-03-11
Attending: INTERNAL MEDICINE
Payer: MEDICARE

## 2019-03-11 ENCOUNTER — HOSPITAL ENCOUNTER (OUTPATIENT)
Dept: BONE DENSITY | Age: 64
Discharge: HOME | End: 2019-03-11
Attending: INTERNAL MEDICINE
Payer: MEDICARE

## 2019-03-11 DIAGNOSIS — Z12.31 ENCOUNTER FOR SCREENING MAMMOGRAM FOR MALIGNANT NEOPLASM OF BREAST: ICD-10-CM

## 2019-03-11 DIAGNOSIS — Z78.0 POST-MENOPAUSAL: ICD-10-CM

## 2019-03-11 PROCEDURE — 77080 DXA BONE DENSITY AXIAL: CPT | Performed by: INTERNAL MEDICINE

## 2019-03-11 PROCEDURE — 77067 SCR MAMMO BI INCL CAD: CPT | Performed by: INTERNAL MEDICINE

## 2019-03-11 PROCEDURE — 77063 BREAST TOMOSYNTHESIS BI: CPT | Performed by: INTERNAL MEDICINE

## 2019-03-19 ENCOUNTER — TELEPHONE (OUTPATIENT)
Dept: INTERNAL MEDICINE CLINIC | Facility: CLINIC | Age: 64
End: 2019-03-19

## 2019-04-01 RX ORDER — BUDESONIDE AND FORMOTEROL FUMARATE DIHYDRATE 160; 4.5 UG/1; UG/1
AEROSOL RESPIRATORY (INHALATION)
Qty: 1 INHALER | Refills: 2 | Status: SHIPPED | OUTPATIENT
Start: 2019-04-01 | End: 2019-07-02

## 2019-04-01 NOTE — TELEPHONE ENCOUNTER
Last Office Visit: 2-28-19 with TB for cough   Last Rx Filled: 3-5-19 1 inhaler with no refills   Last Labs: 10-18-18 tsh/t4/lipid/cbc/cmp  Future Appointment: none    Per protocol to provider     Please call for ACT

## 2019-04-24 PROCEDURE — 82785 ASSAY OF IGE: CPT | Performed by: INTERNAL MEDICINE

## 2019-05-03 ENCOUNTER — OFFICE VISIT (OUTPATIENT)
Dept: UROLOGY | Facility: HOSPITAL | Age: 64
End: 2019-05-03
Attending: OBSTETRICS & GYNECOLOGY
Payer: MEDICARE

## 2019-05-03 VITALS
BODY MASS INDEX: 30.22 KG/M2 | DIASTOLIC BLOOD PRESSURE: 84 MMHG | SYSTOLIC BLOOD PRESSURE: 126 MMHG | WEIGHT: 188 LBS | HEIGHT: 66 IN

## 2019-05-03 DIAGNOSIS — N39.41 URGE INCONTINENCE: ICD-10-CM

## 2019-05-03 DIAGNOSIS — N39.3 FEMALE STRESS INCONTINENCE: ICD-10-CM

## 2019-05-03 DIAGNOSIS — N95.2 POSTMENOPAUSAL ATROPHIC VAGINITIS: ICD-10-CM

## 2019-05-03 DIAGNOSIS — N90.4 VULVAR DYSTROPHY: Primary | ICD-10-CM

## 2019-05-03 PROCEDURE — 99201 HC OUTPT EVAL AND MGNT NEW PT LEVEL 1: CPT

## 2019-05-03 RX ORDER — ESTRADIOL 10 UG/1
10 INSERT VAGINAL
Qty: 24 TABLET | Refills: 3 | Status: SHIPPED | OUTPATIENT
Start: 2019-05-06 | End: 2019-12-03

## 2019-05-03 RX ORDER — CLOBETASOL PROPIONATE 0.5 MG/G
CREAM TOPICAL
Qty: 1 TUBE | Refills: 3 | Status: SHIPPED | OUTPATIENT
Start: 2019-05-03 | End: 2020-01-24

## 2019-05-15 ENCOUNTER — TELEPHONE (OUTPATIENT)
Dept: UROLOGY | Facility: HOSPITAL | Age: 64
End: 2019-05-15

## 2019-05-15 NOTE — TELEPHONE ENCOUNTER
Patient called stating she started her Estradiol tablets this week and accidentally used 2 x 2 days instead of 2 tablets  2 times a week. Patient worried she might overdose.  Informed her she would be fine, reviewed side effects with her, she verbalized u

## 2019-05-24 ENCOUNTER — HOSPITAL ENCOUNTER (EMERGENCY)
Facility: HOSPITAL | Age: 64
Discharge: HOME | End: 2019-05-24
Payer: MEDICARE

## 2019-05-24 VITALS
HEART RATE: 71 BPM | SYSTOLIC BLOOD PRESSURE: 149 MMHG | TEMPERATURE: 98 F | OXYGEN SATURATION: 96 % | WEIGHT: 190 LBS | RESPIRATION RATE: 18 BRPM | BODY MASS INDEX: 30.53 KG/M2 | HEIGHT: 66 IN | DIASTOLIC BLOOD PRESSURE: 76 MMHG

## 2019-05-24 DIAGNOSIS — S81.811A LEG LACERATION, RIGHT, INITIAL ENCOUNTER: Primary | ICD-10-CM

## 2019-05-24 PROCEDURE — 12002 RPR S/N/AX/GEN/TRNK2.6-7.5CM: CPT

## 2019-05-24 PROCEDURE — 99283 EMERGENCY DEPT VISIT LOW MDM: CPT

## 2019-05-24 PROCEDURE — 99282 EMERGENCY DEPT VISIT SF MDM: CPT

## 2019-05-24 NOTE — ED INITIAL ASSESSMENT (HPI)
Patient here for laceration to right inner knee from broken glass. States last tdap was about three years ago.

## 2019-05-25 NOTE — ED PROVIDER NOTES
Patient Seen in: [facility] Emergency Department    History   Patient presents with:  Laceration Abrasion (integumentary)    Stated Complaint: laceration    HPI    Dallin is a 25-year-old female who presents today for evaluation of a laceration to he frequency: Less than monthly      Comment: cage 2/28/19    Drug use: No      Review of Systems    Positive for stated complaint: laceration  Other systems are as noted in HPI. Constitutional and vital signs reviewed.       All other systems reviewed and ne instructions on wound care given to patient. MDM   Clinical impression: Leg laceration   Plan: Patient is given explicit wound care instructions. She is instructed to monitor for signs of infection. Sutures should be removed in a proximally 10 days.

## 2019-06-03 ENCOUNTER — OFFICE VISIT (OUTPATIENT)
Dept: UROLOGY | Facility: HOSPITAL | Age: 64
End: 2019-06-03
Attending: OBSTETRICS & GYNECOLOGY
Payer: MEDICARE

## 2019-06-03 ENCOUNTER — OFFICE VISIT (OUTPATIENT)
Dept: FAMILY MEDICINE CLINIC | Facility: CLINIC | Age: 64
End: 2019-06-03
Payer: MEDICARE

## 2019-06-03 VITALS — DIASTOLIC BLOOD PRESSURE: 74 MMHG | SYSTOLIC BLOOD PRESSURE: 118 MMHG

## 2019-06-03 VITALS
OXYGEN SATURATION: 96 % | WEIGHT: 188 LBS | BODY MASS INDEX: 30.22 KG/M2 | RESPIRATION RATE: 16 BRPM | HEIGHT: 66 IN | DIASTOLIC BLOOD PRESSURE: 76 MMHG | HEART RATE: 72 BPM | TEMPERATURE: 99 F | SYSTOLIC BLOOD PRESSURE: 111 MMHG

## 2019-06-03 DIAGNOSIS — N39.3 FEMALE STRESS INCONTINENCE: Primary | ICD-10-CM

## 2019-06-03 DIAGNOSIS — Z48.02 VISIT FOR SUTURE REMOVAL: Primary | ICD-10-CM

## 2019-06-03 PROCEDURE — 51729 CYSTOMETROGRAM W/VP&UP: CPT

## 2019-06-03 PROCEDURE — 51784 ANAL/URINARY MUSCLE STUDY: CPT

## 2019-06-03 PROCEDURE — 99024 POSTOP FOLLOW-UP VISIT: CPT | Performed by: NURSE PRACTITIONER

## 2019-06-03 PROCEDURE — 51797 INTRAABDOMINAL PRESSURE TEST: CPT

## 2019-06-03 PROCEDURE — 51741 ELECTRO-UROFLOWMETRY FIRST: CPT

## 2019-06-03 PROCEDURE — 81002 URINALYSIS NONAUTO W/O SCOPE: CPT

## 2019-06-03 NOTE — PROCEDURES
.Patient here for urodynamic testing. Procedure explained and confirmed by patient. See evaluation form for results. Both verbal and written discharge instructions were given.   Patient tolerated procedure well and will follow up with Dr. Penny Patel on Venlafaxine HCl ER 75 MG Oral Capsule SR 24 Hr TAKE 1 CAPSULE BY MOUTH EVERY MORNING Disp: 90 capsule Rfl: 3   Venlafaxine HCl  MG Oral Capsule SR 24 Hr Take 1 capsule (150 mg total) by mouth daily.  Disp: 90 capsule Rfl: 3   NAPROXEN 500 MG Oral Ta 200 mL  Detrusor instability provoked by:    []  Spontaneous []  Coughing  [x]  Filling  []  Heel Bounce  []  Running Water []  Valsalva  []  Hand washing [x]  Other following UPP -     Additional Notes:  Difficulty with artifact on tracing  - DO was demo

## 2019-06-03 NOTE — PROGRESS NOTES
Pt presents to [facility] for suture removal which was placed on 5/24/19 in right inner leg below knee. Pt stated she had 6 sutures placed, but one fell out several days ago and she never returned to have it checked.  5 sutures were removed with incision was CDI w

## 2019-06-03 NOTE — PATIENT INSTRUCTIONS
[facility]  [address] [place] [address],[address]  [place] [facility]  Office: [phone]      Urodynamic Testing Discharge Instructions: There are NO dietary or activity restrictions. You may resume your normal schedule.       You may hav

## 2019-06-14 ENCOUNTER — OFFICE VISIT (OUTPATIENT)
Dept: UROLOGY | Facility: HOSPITAL | Age: 64
End: 2019-06-14
Attending: OBSTETRICS & GYNECOLOGY
Payer: MEDICARE

## 2019-06-14 VITALS — WEIGHT: 188 LBS | BODY MASS INDEX: 30.22 KG/M2 | RESPIRATION RATE: 18 BRPM | HEIGHT: 66 IN

## 2019-06-14 DIAGNOSIS — N39.41 URGE INCONTINENCE: Primary | ICD-10-CM

## 2019-06-14 PROCEDURE — 99211 OFF/OP EST MAY X REQ PHY/QHP: CPT

## 2019-07-02 NOTE — TELEPHONE ENCOUNTER
Last Ov: 2/28/19, TB, cough  Upcoming appt: no upcoming appt  Last labs: CBC, CMP, Lipid, TSH+Free T4 10/18/18  Last Rx: symbicort 160/4.5mcg/act, 1 inhaler, 2R, 4/1/19    Per Protocol, fail. Pt's last asthma score out of range and pt due for AAP/ACT.  Rx p

## 2019-07-03 RX ORDER — BUDESONIDE AND FORMOTEROL FUMARATE DIHYDRATE 160; 4.5 UG/1; UG/1
AEROSOL RESPIRATORY (INHALATION)
Qty: 10.2 INHALER | Refills: 1 | Status: SHIPPED | OUTPATIENT
Start: 2019-07-03 | End: 2019-09-12

## 2019-09-12 ENCOUNTER — TELEPHONE (OUTPATIENT)
Dept: INTERNAL MEDICINE CLINIC | Facility: CLINIC | Age: 64
End: 2019-09-12

## 2019-09-12 DIAGNOSIS — E78.5 DYSLIPIDEMIA: ICD-10-CM

## 2019-09-12 DIAGNOSIS — K63.5 POLYP OF COLON, UNSPECIFIED PART OF COLON, UNSPECIFIED TYPE: ICD-10-CM

## 2019-09-12 DIAGNOSIS — E03.9 HYPOTHYROIDISM, UNSPECIFIED TYPE: ICD-10-CM

## 2019-09-12 DIAGNOSIS — I10 ESSENTIAL HYPERTENSION: Primary | ICD-10-CM

## 2019-09-12 RX ORDER — BUDESONIDE AND FORMOTEROL FUMARATE DIHYDRATE 160; 4.5 UG/1; UG/1
AEROSOL RESPIRATORY (INHALATION)
Qty: 1 INHALER | Refills: 0 | Status: SHIPPED | OUTPATIENT
Start: 2019-09-12 | End: 2019-10-31

## 2019-09-12 NOTE — TELEPHONE ENCOUNTER
Last Ov: 2/28/19, TB, acute  Upcoming appt: no upcoming appt  Last labs: CBC, CMP, Lipid, TSH + Free T4 10/18/18  Last Rx: symbicort 160/4.5 mcg/act, 10.2 in, 1R 7/3/19    Per Protocol, failed.  Pt's last asthma action score out of range, due for AAP/ACT, a

## 2019-09-19 NOTE — PROGRESS NOTES
Pt is scheduled for a supervisit   Future Appointments   Date Time Provider Radha Willoughby   10/14/2019  3:20 PM Meryle Barrs, MD [facility]     No need to send the letter

## 2019-09-22 ENCOUNTER — MA CHART PREP (OUTPATIENT)
Dept: FAMILY MEDICINE CLINIC | Facility: CLINIC | Age: 64
End: 2019-09-22

## 2019-09-22 PROBLEM — R94.4 DECREASED GFR: Status: ACTIVE | Noted: 2019-09-22

## 2019-09-22 PROBLEM — I70.0 ATHEROSCLEROSIS OF AORTA: Status: ACTIVE | Noted: 2019-09-22

## 2019-09-22 PROBLEM — I70.0 ATHEROSCLEROSIS OF AORTA (HCC): Status: ACTIVE | Noted: 2019-09-22

## 2019-10-07 ENCOUNTER — LAB ENCOUNTER (OUTPATIENT)
Dept: LAB | Age: 64
End: 2019-10-07
Attending: INTERNAL MEDICINE
Payer: MEDICARE

## 2019-10-07 DIAGNOSIS — E03.9 HYPOTHYROIDISM, UNSPECIFIED TYPE: ICD-10-CM

## 2019-10-07 DIAGNOSIS — K63.5 POLYP OF COLON, UNSPECIFIED PART OF COLON, UNSPECIFIED TYPE: ICD-10-CM

## 2019-10-07 DIAGNOSIS — I10 ESSENTIAL HYPERTENSION: ICD-10-CM

## 2019-10-07 DIAGNOSIS — E78.5 DYSLIPIDEMIA: ICD-10-CM

## 2019-10-07 PROCEDURE — 85025 COMPLETE CBC W/AUTO DIFF WBC: CPT

## 2019-10-07 PROCEDURE — 80061 LIPID PANEL: CPT

## 2019-10-07 PROCEDURE — 84443 ASSAY THYROID STIM HORMONE: CPT

## 2019-10-07 PROCEDURE — 80053 COMPREHEN METABOLIC PANEL: CPT

## 2019-11-01 RX ORDER — BUDESONIDE AND FORMOTEROL FUMARATE DIHYDRATE 160; 4.5 UG/1; UG/1
AEROSOL RESPIRATORY (INHALATION)
Qty: 1 INHALER | Refills: 0 | Status: SHIPPED | OUTPATIENT
Start: 2019-11-01 | End: 2019-12-06

## 2019-11-01 NOTE — TELEPHONE ENCOUNTER
Last Ov: 2/28/19, TB, acute  Upcoming appt: 12/3/19, TB  Last labs: TSH w Ref T4, Lipid, CMP, CBC 10/7/19  Last Rx: symbicort 160/4.5 mcg/act, #1, 0R 9/12/19    Per Protocol, failed. Pt due for AAP/ACT and last asthma score out of range. Rx pending.

## 2019-11-20 ENCOUNTER — MA CHART PREP (OUTPATIENT)
Dept: FAMILY MEDICINE CLINIC | Facility: CLINIC | Age: 64
End: 2019-11-20

## 2019-11-20 PROBLEM — J82.83 EOSINOPHILIC ASTHMA: Status: ACTIVE | Noted: 2019-11-20

## 2019-11-20 PROBLEM — J82.83 EOSINOPHILIC ASTHMA (HCC): Status: ACTIVE | Noted: 2019-11-20

## 2019-12-03 ENCOUNTER — OFFICE VISIT (OUTPATIENT)
Dept: INTERNAL MEDICINE CLINIC | Facility: CLINIC | Age: 64
End: 2019-12-03
Payer: MEDICARE

## 2019-12-03 VITALS
WEIGHT: 190.81 LBS | DIASTOLIC BLOOD PRESSURE: 78 MMHG | BODY MASS INDEX: 30.3 KG/M2 | HEART RATE: 60 BPM | SYSTOLIC BLOOD PRESSURE: 120 MMHG | HEIGHT: 66.5 IN | RESPIRATION RATE: 16 BRPM | OXYGEN SATURATION: 98 %

## 2019-12-03 DIAGNOSIS — E78.5 DYSLIPIDEMIA: ICD-10-CM

## 2019-12-03 DIAGNOSIS — M25.511 CHRONIC RIGHT SHOULDER PAIN: ICD-10-CM

## 2019-12-03 DIAGNOSIS — G89.29 CHRONIC RIGHT SHOULDER PAIN: ICD-10-CM

## 2019-12-03 DIAGNOSIS — E03.8 OTHER SPECIFIED HYPOTHYROIDISM: ICD-10-CM

## 2019-12-03 DIAGNOSIS — I10 ESSENTIAL HYPERTENSION: ICD-10-CM

## 2019-12-03 DIAGNOSIS — Z12.83 SCREENING FOR SKIN CANCER: ICD-10-CM

## 2019-12-03 DIAGNOSIS — R23.2 HOT FLASHES: ICD-10-CM

## 2019-12-03 DIAGNOSIS — J82.83 EOSINOPHILIC ASTHMA: ICD-10-CM

## 2019-12-03 DIAGNOSIS — H91.93 HEARING LOSS ASSOCIATED WITH SYNDROME OF BOTH EARS: ICD-10-CM

## 2019-12-03 DIAGNOSIS — Z00.00 ENCOUNTER FOR ANNUAL HEALTH EXAMINATION: Primary | ICD-10-CM

## 2019-12-03 DIAGNOSIS — I70.0 ATHEROSCLEROSIS OF AORTA (HCC): ICD-10-CM

## 2019-12-03 PROCEDURE — 99397 PER PM REEVAL EST PAT 65+ YR: CPT | Performed by: INTERNAL MEDICINE

## 2019-12-03 PROCEDURE — G0438 PPPS, INITIAL VISIT: HCPCS | Performed by: INTERNAL MEDICINE

## 2019-12-03 PROCEDURE — 96160 PT-FOCUSED HLTH RISK ASSMT: CPT | Performed by: INTERNAL MEDICINE

## 2019-12-03 RX ORDER — PRAVASTATIN SODIUM 80 MG/1
80 TABLET ORAL NIGHTLY
Qty: 30 TABLET | Refills: 3 | Status: SHIPPED | OUTPATIENT
Start: 2019-12-03 | End: 2020-05-06

## 2019-12-03 RX ORDER — DULOXETIN HYDROCHLORIDE 30 MG/1
30 CAPSULE, DELAYED RELEASE ORAL DAILY
Qty: 30 CAPSULE | Refills: 2 | Status: SHIPPED | OUTPATIENT
Start: 2019-12-03 | End: 2020-03-12

## 2019-12-03 NOTE — PATIENT INSTRUCTIONS
Jackie Mejia's SCREENING SCHEDULE   Tests on this list are recommended by your physician but may not be covered, or covered at this frequency, by your insurer. Please check with your insurance carrier before scheduling to verify coverage.    PREVENTAT the following criteria:   • Men who are 73-68 years old and have smoked more than 100 cigarettes in their lifetime   • Anyone with a family history    Colorectal Cancer Screening  Covered up to Age 76     Colonoscopy Screen   Covered every 10 years- more o orders found for this or any previous visit.  Please get every year    Pneumococcal 13 (Prevnar)  Covered Once after 65 Orders placed or performed in visit on 10/24/18   • PNEUMOCOCCAL VACC, 13 PITA IM    Please get once after your 65th birthday    [facility] regarding Advance Directives.

## 2019-12-03 NOTE — PROGRESS NOTES
HPI:   Maria T Hart is a 75year old female who presents for a MA (Medicare Advantage) Supervisit (Once per calendar year). Patient doing ok.  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 Kiirt Springer problems based on screening of functional status.    Hearing Problems?: Yes                  Depression Screening (PHQ-2/PHQ-9): Over the LAST 2 WEEKS   Little interest or pleasure in doing things (over the last two weeks)?: Not at all  Feeling down, depres 189 10/07/2019    HDL 38 (L) 10/07/2019     (H) 10/07/2019    TRIG 192 (H) 10/07/2019          Last Chemistry Labs:   Lab Results   Component Value Date    AST 16 10/07/2019    ALT 24 10/07/2019    CA 8.9 10/07/2019    ALB 3.9 10/07/2019    TSH 1.95 Family history of heart disease, Ganglion of right wrist, Hypothyroidism (7/2014), Inguinal hernia (1998), Menopausal symptoms, Obesity, Plantar fasciitis, Prediabetes, Right knee meniscal tear (2009), and Tendonitis of ankle, left.     She  has a past surg atraumatic   Eyes:  PERRL, conjunctiva/corneas clear, EOM's intact both eyes   Ears:  Normal TM's and external ear canals, both ears   Nose: Nares normal, septum midline,mucosa normal, no drainage or sinus tenderness   Throat: Lips, mucosa, and tongue norm vaccinations including shingrix    Essential hypertension- controlled, CPM    Other specified hypothyroidism- stable, CPM    Dyslipidemia- would like LDL at least less than 100.  Change from lovastatin to pravastatin 80mg daily, rpt lipids in 2-3 months  - Component Value Date    A1C 5.8 (H) 07/22/2015    No flowsheet data found.     Fasting Blood Sugar (FSB)Annually Glucose (mg/dL)   Date Value   10/07/2019 90     GLUCOSE (mg/dL)   Date Value   08/09/2013 95          Cardiovascular Disease Screening     LD No vaccine history found Medium/high risk factors:   End-stage renal disease   Hemophiliacs who received Factor VIII or IX concentrates   Clients of institutions for the mentally retarded   Persons who live in the same house as a HepB virus carrier   [facility]

## 2019-12-09 RX ORDER — BUDESONIDE AND FORMOTEROL FUMARATE DIHYDRATE 160; 4.5 UG/1; UG/1
2 AEROSOL RESPIRATORY (INHALATION) 2 TIMES DAILY
Qty: 3 INHALER | Refills: 3 | Status: SHIPPED | OUTPATIENT
Start: 2019-12-09 | End: 2021-05-06

## 2020-01-24 ENCOUNTER — OFFICE VISIT (OUTPATIENT)
Dept: INTERNAL MEDICINE CLINIC | Facility: CLINIC | Age: 65
End: 2020-01-24
Payer: MEDICARE

## 2020-01-24 VITALS
SYSTOLIC BLOOD PRESSURE: 126 MMHG | HEIGHT: 66 IN | BODY MASS INDEX: 30.92 KG/M2 | TEMPERATURE: 99 F | RESPIRATION RATE: 16 BRPM | WEIGHT: 192.38 LBS | OXYGEN SATURATION: 94 % | DIASTOLIC BLOOD PRESSURE: 72 MMHG | HEART RATE: 96 BPM

## 2020-01-24 DIAGNOSIS — J82.83 EOSINOPHILIC ASTHMA: ICD-10-CM

## 2020-01-24 DIAGNOSIS — R05.3 PERSISTENT COUGH FOR 3 WEEKS OR LONGER: Primary | ICD-10-CM

## 2020-01-24 PROCEDURE — 99213 OFFICE O/P EST LOW 20 MIN: CPT | Performed by: INTERNAL MEDICINE

## 2020-01-24 RX ORDER — LEVOFLOXACIN 500 MG/1
500 TABLET, FILM COATED ORAL DAILY
Qty: 10 TABLET | Refills: 0 | Status: SHIPPED | OUTPATIENT
Start: 2020-01-24 | End: 2020-02-03

## 2020-01-24 RX ORDER — PREDNISONE 20 MG/1
TABLET ORAL
Qty: 15 TABLET | Refills: 0 | Status: SHIPPED | OUTPATIENT
Start: 2020-01-24 | End: 2020-03-31 | Stop reason: ALTCHOICE

## 2020-01-24 NOTE — PROGRESS NOTES
Carine Husain is a 77year old female. Patient presents with:  URI: LM rm 4 x2 weeks mucus last week cough started. HPI:     C/o productive cough for 3 weeks now, getting worse over the last week.  Feels winded and chest hurts every times she cough hours as needed for Wheezing or Shortness of Breath (cough).  1 Inhaler 1   • Metoprolol Tartrate 50 MG Oral Tab TAKE 1/2 TABLET BY MOUTH DAILY 90 tablet 3   • Levothyroxine Sodium 75 MCG Oral Tab Take 1 tablet (75 mcg total) by mouth before breakfast. 90 t Cancer Mother         lung   • Cancer Paternal Grandfather         lung   • Heart Attack Maternal Grandfather    • Heart Disease Maternal Grandmother    • Heart Disease Brother 52        stents   • High Blood Pressure Brother    • Other (Other) Son (CPT=71046)    Return if symptoms worsen or fail to improve. There are no Patient Instructions on file for this visit. The patient indicates understanding of these issues and agrees to the plan.

## 2020-01-25 ENCOUNTER — HOSPITAL ENCOUNTER (OUTPATIENT)
Dept: GENERAL RADIOLOGY | Facility: HOSPITAL | Age: 65
Discharge: HOME | End: 2020-01-25
Attending: INTERNAL MEDICINE
Payer: MEDICARE

## 2020-01-25 DIAGNOSIS — R05.3 PERSISTENT COUGH FOR 3 WEEKS OR LONGER: ICD-10-CM

## 2020-01-25 PROCEDURE — 71046 X-RAY EXAM CHEST 2 VIEWS: CPT | Performed by: INTERNAL MEDICINE

## 2020-03-12 DIAGNOSIS — R23.2 HOT FLASHES: ICD-10-CM

## 2020-03-12 RX ORDER — DULOXETIN HYDROCHLORIDE 30 MG/1
CAPSULE, DELAYED RELEASE ORAL
Qty: 90 CAPSULE | Refills: 0 | Status: SHIPPED | OUTPATIENT
Start: 2020-03-12 | End: 2020-06-21 | Stop reason: DRUGHIGH

## 2020-03-12 NOTE — TELEPHONE ENCOUNTER
LOV:1/24/20 TB  FOV:none on file   LAST RX:12/3/19 30 mg take 1 cap daily 30 caps 2 refills   LAST LABS:10/7/19 cbc,cmp,lipids,tsh  PER PROTOCOL: to provider

## 2020-05-06 DIAGNOSIS — E78.5 DYSLIPIDEMIA: ICD-10-CM

## 2020-05-06 RX ORDER — PRAVASTATIN SODIUM 80 MG/1
TABLET ORAL
Qty: 30 TABLET | Refills: 3 | Status: SHIPPED | OUTPATIENT
Start: 2020-05-06 | End: 2020-10-13

## 2020-05-24 DIAGNOSIS — I10 ESSENTIAL HYPERTENSION: ICD-10-CM

## 2020-05-26 RX ORDER — METOPROLOL TARTRATE 50 MG/1
TABLET, FILM COATED ORAL
Qty: 90 TABLET | Refills: 0 | Status: SHIPPED | OUTPATIENT
Start: 2020-05-26 | End: 2020-10-13

## 2020-06-19 ENCOUNTER — TELEPHONE (OUTPATIENT)
Dept: INTERNAL MEDICINE CLINIC | Facility: CLINIC | Age: 65
End: 2020-06-19

## 2020-06-19 DIAGNOSIS — I10 ESSENTIAL HYPERTENSION: ICD-10-CM

## 2020-06-19 DIAGNOSIS — Z13.0 SCREENING FOR BLOOD DISEASE: ICD-10-CM

## 2020-06-19 DIAGNOSIS — Z00.00 ENCOUNTER FOR ANNUAL HEALTH EXAMINATION: Primary | ICD-10-CM

## 2020-06-19 DIAGNOSIS — E78.5 DYSLIPIDEMIA: ICD-10-CM

## 2020-06-19 DIAGNOSIS — E03.9 HYPOTHYROIDISM, UNSPECIFIED TYPE: ICD-10-CM

## 2020-06-19 NOTE — TELEPHONE ENCOUNTER
Pt is on an anti depressant she doesn't remember the name and she is calling to see if she can increase the dose. She did take a few and it seems to have helped.  She is scheduled to see TB on   Future Appointments   Date Time Provider Radha Xiong

## 2020-06-19 NOTE — TELEPHONE ENCOUNTER
Patient states TB ordered Duloxetine 30mg daily on 12/3/19, received a refill 3/12/20, April pt started to have clicking noises in her ear and static sounds, decided she would take 2 Duloxetine capsules daily and the s/s went away, now she is running out o

## 2020-06-19 NOTE — TELEPHONE ENCOUNTER
Supervisit   Future Appointments   Date Time Provider Radha Gonzalezi   8/6/2020  1:40 PM Gopal Avila MD [facility]        Orders to Fani Overlie aware must fast no call back required

## 2020-06-21 RX ORDER — DULOXETIN HYDROCHLORIDE 60 MG/1
60 CAPSULE, DELAYED RELEASE ORAL DAILY
Qty: 30 CAPSULE | Refills: 1 | Status: SHIPPED | OUTPATIENT
Start: 2020-06-21 | End: 2020-08-21

## 2020-06-21 NOTE — TELEPHONE ENCOUNTER
Since the 60mg dose is helping, I sent to the pharmacy higher dose of 60mg for her.   Please let her know

## 2020-06-22 NOTE — TELEPHONE ENCOUNTER
Spoke with patient she is aware Rx sent to pharmacy on file. Patient verbalized understanding and agreeable to POC.

## 2020-06-26 ENCOUNTER — TELEPHONE (OUTPATIENT)
Dept: INTERNAL MEDICINE CLINIC | Facility: CLINIC | Age: 65
End: 2020-06-26

## 2020-06-26 DIAGNOSIS — R92.2 DENSE BREASTS: ICD-10-CM

## 2020-06-26 DIAGNOSIS — Z12.31 ENCOUNTER FOR SCREENING MAMMOGRAM FOR MALIGNANT NEOPLASM OF BREAST: Primary | ICD-10-CM

## 2020-06-26 NOTE — TELEPHONE ENCOUNTER
Last mammogram 10/24/2018. TB please advise.   =====  CONCLUSION:     DIAGNOSTIC CATEGORY 2--BENIGN FINDING:       RECOMMENDATIONS:    ROUTINE MAMMOGRAM AND CLINICAL EVALUATION IN 12 MONTHS.

## 2020-07-23 NOTE — TELEPHONE ENCOUNTER
Bloodwork orders placed to THE [facility] lab for upcoming physical per protocol.

## 2020-08-11 PROBLEM — M75.121 NONTRAUMATIC COMPLETE TEAR OF RIGHT ROTATOR CUFF: Status: ACTIVE | Noted: 2020-08-11

## 2020-08-11 PROBLEM — M66.321 NONTRAUMATIC RUPTURE OF RIGHT LONG HEAD BICEPS TENDON: Status: ACTIVE | Noted: 2020-08-11

## 2020-08-17 ENCOUNTER — LAB ENCOUNTER (OUTPATIENT)
Dept: LAB | Age: 65
End: 2020-08-17
Attending: INTERNAL MEDICINE
Payer: MEDICARE

## 2020-08-17 DIAGNOSIS — Z13.0 SCREENING FOR BLOOD DISEASE: ICD-10-CM

## 2020-08-17 DIAGNOSIS — I10 ESSENTIAL HYPERTENSION: ICD-10-CM

## 2020-08-17 DIAGNOSIS — Z00.00 ENCOUNTER FOR ANNUAL HEALTH EXAMINATION: ICD-10-CM

## 2020-08-17 DIAGNOSIS — E78.5 DYSLIPIDEMIA: ICD-10-CM

## 2020-08-17 DIAGNOSIS — E03.9 HYPOTHYROIDISM, UNSPECIFIED TYPE: ICD-10-CM

## 2020-08-17 LAB
ALBUMIN SERPL-MCNC: 3.7 G/DL (ref 3.4–5)
ALBUMIN/GLOB SERPL: 1.2 {RATIO} (ref 1–2)
ALP LIVER SERPL-CCNC: 75 U/L (ref 55–142)
ALT SERPL-CCNC: 27 U/L (ref 13–56)
ANION GAP SERPL CALC-SCNC: 3 MMOL/L (ref 0–18)
AST SERPL-CCNC: 23 U/L (ref 15–37)
BASOPHILS # BLD AUTO: 0.03 X10(3) UL (ref 0–0.2)
BASOPHILS NFR BLD AUTO: 0.7 %
BILIRUB DIRECT SERPL-MCNC: 0.1 MG/DL (ref 0–0.2)
BILIRUB SERPL-MCNC: 0.6 MG/DL (ref 0.1–2)
BUN BLD-MCNC: 15 MG/DL (ref 7–18)
BUN/CREAT SERPL: 17.6 (ref 10–20)
CALCIUM BLD-MCNC: 8.9 MG/DL (ref 8.5–10.1)
CHLORIDE SERPL-SCNC: 109 MMOL/L (ref 98–112)
CHOLEST SMN-MCNC: 208 MG/DL (ref ?–200)
CO2 SERPL-SCNC: 29 MMOL/L (ref 21–32)
CREAT BLD-MCNC: 0.85 MG/DL (ref 0.55–1.02)
DEPRECATED RDW RBC AUTO: 41.5 FL (ref 35.1–46.3)
EOSINOPHIL # BLD AUTO: 0.06 X10(3) UL (ref 0–0.7)
EOSINOPHIL NFR BLD AUTO: 1.5 %
ERYTHROCYTE [DISTWIDTH] IN BLOOD BY AUTOMATED COUNT: 12.8 % (ref 11–15)
GLOBULIN PLAS-MCNC: 3 G/DL (ref 2.8–4.4)
GLUCOSE BLD-MCNC: 92 MG/DL (ref 70–99)
HCT VFR BLD AUTO: 42.2 % (ref 35–48)
HDLC SERPL-MCNC: 37 MG/DL (ref 40–59)
HGB BLD-MCNC: 13.3 G/DL (ref 12–16)
IMM GRANULOCYTES # BLD AUTO: 0.01 X10(3) UL (ref 0–1)
IMM GRANULOCYTES NFR BLD: 0.2 %
LDLC SERPL CALC-MCNC: 137 MG/DL (ref ?–100)
LYMPHOCYTES # BLD AUTO: 1.44 X10(3) UL (ref 1–4)
LYMPHOCYTES NFR BLD AUTO: 35 %
M PROTEIN MFR SERPL ELPH: 6.7 G/DL (ref 6.4–8.2)
MCH RBC QN AUTO: 28.4 PG (ref 26–34)
MCHC RBC AUTO-ENTMCNC: 31.5 G/DL (ref 31–37)
MCV RBC AUTO: 90 FL (ref 80–100)
MONOCYTES # BLD AUTO: 0.51 X10(3) UL (ref 0.1–1)
MONOCYTES NFR BLD AUTO: 12.4 %
NEUTROPHILS # BLD AUTO: 2.07 X10 (3) UL (ref 1.5–7.7)
NEUTROPHILS # BLD AUTO: 2.07 X10(3) UL (ref 1.5–7.7)
NEUTROPHILS NFR BLD AUTO: 50.2 %
NONHDLC SERPL-MCNC: 171 MG/DL (ref ?–130)
OSMOLALITY SERPL CALC.SUM OF ELEC: 292 MOSM/KG (ref 275–295)
PATIENT FASTING Y/N/NP: YES
PATIENT FASTING Y/N/NP: YES
PLATELET # BLD AUTO: 200 10(3)UL (ref 150–450)
POTASSIUM SERPL-SCNC: 4.6 MMOL/L (ref 3.5–5.1)
RBC # BLD AUTO: 4.69 X10(6)UL (ref 3.8–5.3)
SODIUM SERPL-SCNC: 141 MMOL/L (ref 136–145)
TRIGL SERPL-MCNC: 171 MG/DL (ref 30–149)
TSI SER-ACNC: 3.42 MIU/ML (ref 0.36–3.74)
VLDLC SERPL CALC-MCNC: 34 MG/DL (ref 0–30)
WBC # BLD AUTO: 4.1 X10(3) UL (ref 4–11)

## 2020-08-17 PROCEDURE — 85025 COMPLETE CBC W/AUTO DIFF WBC: CPT

## 2020-08-17 PROCEDURE — 80061 LIPID PANEL: CPT

## 2020-08-17 PROCEDURE — 84443 ASSAY THYROID STIM HORMONE: CPT

## 2020-08-17 PROCEDURE — 82248 BILIRUBIN DIRECT: CPT

## 2020-08-17 PROCEDURE — 80053 COMPREHEN METABOLIC PANEL: CPT

## 2020-08-20 ENCOUNTER — OFFICE VISIT (OUTPATIENT)
Dept: INTERNAL MEDICINE CLINIC | Facility: CLINIC | Age: 65
End: 2020-08-20
Payer: MEDICARE

## 2020-08-20 VITALS
SYSTOLIC BLOOD PRESSURE: 112 MMHG | DIASTOLIC BLOOD PRESSURE: 78 MMHG | HEART RATE: 76 BPM | TEMPERATURE: 97 F | WEIGHT: 192 LBS | HEIGHT: 66 IN | BODY MASS INDEX: 30.86 KG/M2

## 2020-08-20 DIAGNOSIS — I10 ESSENTIAL HYPERTENSION: ICD-10-CM

## 2020-08-20 DIAGNOSIS — I70.0 ATHEROSCLEROSIS OF AORTA (HCC): ICD-10-CM

## 2020-08-20 DIAGNOSIS — F32.A MILD DEPRESSION: ICD-10-CM

## 2020-08-20 DIAGNOSIS — E78.5 DYSLIPIDEMIA: ICD-10-CM

## 2020-08-20 DIAGNOSIS — M75.121 NONTRAUMATIC COMPLETE TEAR OF RIGHT ROTATOR CUFF: ICD-10-CM

## 2020-08-20 DIAGNOSIS — E03.8 OTHER SPECIFIED HYPOTHYROIDISM: ICD-10-CM

## 2020-08-20 DIAGNOSIS — J82.83 EOSINOPHILIC ASTHMA: ICD-10-CM

## 2020-08-20 DIAGNOSIS — Z00.00 ENCOUNTER FOR ANNUAL HEALTH EXAMINATION: Primary | ICD-10-CM

## 2020-08-20 PROCEDURE — 3008F BODY MASS INDEX DOCD: CPT | Performed by: INTERNAL MEDICINE

## 2020-08-20 PROCEDURE — 3074F SYST BP LT 130 MM HG: CPT | Performed by: INTERNAL MEDICINE

## 2020-08-20 PROCEDURE — 99397 PER PM REEVAL EST PAT 65+ YR: CPT | Performed by: INTERNAL MEDICINE

## 2020-08-20 PROCEDURE — 3078F DIAST BP <80 MM HG: CPT | Performed by: INTERNAL MEDICINE

## 2020-08-20 PROCEDURE — G0439 PPPS, SUBSEQ VISIT: HCPCS | Performed by: INTERNAL MEDICINE

## 2020-08-20 PROCEDURE — 93000 ELECTROCARDIOGRAM COMPLETE: CPT | Performed by: INTERNAL MEDICINE

## 2020-08-20 PROCEDURE — 96160 PT-FOCUSED HLTH RISK ASSMT: CPT | Performed by: INTERNAL MEDICINE

## 2020-08-20 NOTE — PROGRESS NOTES
HPI:   Norma Rasmussen is a 77year old female who presents for a MA (Medicare Advantage) Supervisit (Once per calendar year). Encounter for annual health examination- patient has her mammogram scheduled, she is utd on colonoscopy.  Encouraged flu v doing things (over the last two weeks)?: Several days  Feeling down, depressed, or hopeless (over the last two weeks)?: Several days  PHQ-2 SCORE: 2     Advanced Directive:   She does have a Living Will but we do NOT have it on file in [place].    Not Discusse 137 (H) 08/17/2020    TRIG 171 (H) 08/17/2020          Last Chemistry Labs:   Lab Results   Component Value Date    AST 23 08/17/2020    ALT 27 08/17/2020    CA 8.9 08/17/2020    ALB 3.7 08/17/2020    TSH 3.420 08/17/2020    CREATSERUM 0.85 08/17/2020    G (1998), Menopausal symptoms, Obesity, Plantar fasciitis, Prediabetes, Right knee meniscal tear (2009), and Tendonitis of ankle, left.     She  has a past surgical history that includes hernia surgery (1998); knee arthroscopy (2009); colonoscopy (2002); colo General Appearance:  Alert, cooperative, no distress, appears stated age   Head:  Normocephalic, without obvious abnormality, atraumatic   Eyes:  PERRL, conjunctiva/corneas clear, EOM's intact both eyes   Ears:  Normal TM's and external ear canals, bot presents for a Medicare Assessment. PLAN SUMMARY:   Diagnoses and all orders for this visit:    Encounter for annual health examination- patient has her mammogram scheduled, she is utd on colonoscopy.  Encouraged flu vaccine in the fall  Mild depression Cardiovascular Disease Screening     LDL Annually LDL Cholesterol (mg/dL)   Date Value   08/17/2020 137 (H)     LDL-CHOLESTEROL (mg/dL (calc))   Date Value   02/05/2016 125        EKG - w/ Initial Preventative Physical Exam only, or if medically necessar same house as a HepB virus carrier   Homosexual men   Illicit injectable drug abusers     Tetanus Toxoid  Only covered with a cut with metal- TD and TDaP Not covered by Medicare Part B No vaccine history found This may be covered with your prescription sandeep

## 2020-08-20 NOTE — PATIENT INSTRUCTIONS
Armand Mejia's SCREENING SCHEDULE   Tests on this list are recommended by your physician but may not be covered, or covered at this frequency, by your insurer. Please check with your insurance carrier before scheduling to verify coverage.    SARAI of the following criteria:   • Men who are 73-68 years old and have smoked more than 100 cigarettes in their lifetime   • Anyone with a family history    Colorectal Cancer Screening  Covered up to Age 76     Colonoscopy Screen   Covered every 10 years- mor No orders found for this or any previous visit.  Please get every year    Pneumococcal 13 (Prevnar)  Covered Once after 65 Orders placed or performed in visit on 10/24/18   • PNEUMOCOCCAL VACC, 13 PITA IM    Please get once after your 65th birthday    [facility] regarding Advance Directives.

## 2020-08-21 PROBLEM — F32.A MILD DEPRESSION: Status: ACTIVE | Noted: 2020-08-21

## 2020-08-21 RX ORDER — DULOXETIN HYDROCHLORIDE 60 MG/1
60 CAPSULE, DELAYED RELEASE ORAL DAILY
Qty: 30 CAPSULE | Refills: 1 | Status: SHIPPED | OUTPATIENT
Start: 2020-08-21 | End: 2021-02-10

## 2020-10-13 DIAGNOSIS — E78.5 DYSLIPIDEMIA: ICD-10-CM

## 2020-10-13 DIAGNOSIS — I10 ESSENTIAL HYPERTENSION: ICD-10-CM

## 2020-10-13 RX ORDER — PRAVASTATIN SODIUM 80 MG/1
TABLET ORAL
Qty: 90 TABLET | Refills: 1 | Status: SHIPPED | OUTPATIENT
Start: 2020-10-13 | End: 2021-05-06

## 2020-10-13 RX ORDER — METOPROLOL TARTRATE 50 MG/1
25 TABLET, FILM COATED ORAL DAILY
Qty: 45 TABLET | Refills: 1 | Status: SHIPPED | OUTPATIENT
Start: 2020-10-13 | End: 2021-08-05

## 2020-10-22 PROBLEM — G89.18 ACUTE POSTOPERATIVE PAIN OF RIGHT SHOULDER: Status: ACTIVE | Noted: 2020-10-22

## 2020-10-22 PROBLEM — M25.511 ACUTE POSTOPERATIVE PAIN OF RIGHT SHOULDER: Status: ACTIVE | Noted: 2020-10-22

## 2021-01-14 ENCOUNTER — TELEPHONE (OUTPATIENT)
Dept: INTERNAL MEDICINE CLINIC | Facility: CLINIC | Age: 66
End: 2021-01-14

## 2021-01-14 NOTE — TELEPHONE ENCOUNTER
Pt called and stated that she had a rapid covid test today at [facility] and it came back positive     Pt states that she feels like she is having heart palpitations and would like a call back     Please advise

## 2021-01-14 NOTE — TELEPHONE ENCOUNTER
Patient states her dtr and boyfriend live with them, they were + for COVID end of last week, pt started to have s/s Saturday night but Sunday COVID testing was negative, felt worse last night, now Joaquin's COVID test today is positive. Pt states she was having heart palpitations that came and went during the night, no palpitations in 2 hours, some dizziness, loose stool, T=97.8, dull h/a, stuffy nose. Pt denies chest pain, sob, wheezing, head or chest congestion, fever or chills. Pt called Dr Tran Reap office, she has to cancel her Nucala injection for tomorrow, RN told her there is a rashad period of 2 weeks to get her injection. Pt continues Flonase, Symbicort, Spiriva, Claritin and Albuterol inhaler( if needed but has not used, also has a nebulizer available if needed). Pt asked to monitor her s/s, increase water intake, quarantine 10 days, if still s/s call our office. ER warnings given. TB, any recommendations?

## 2021-01-14 NOTE — TELEPHONE ENCOUNTER
Patient scheduled for Doximetry VV for 1/19/21 at 11 am.  No schedule for Wed 11/20/21. Pt verbalizes understanding and call with any questions or concerns.  WANDAI to WILIAN

## 2021-01-19 ENCOUNTER — TELEPHONE (OUTPATIENT)
Dept: INTERNAL MEDICINE CLINIC | Facility: CLINIC | Age: 66
End: 2021-01-19

## 2021-01-19 NOTE — TELEPHONE ENCOUNTER
ITZ pt cancelled her 11am virtual visit today with TB and stated the below. Pt stated she will follow up afterwards.     Patient - Personal (pulmonologist [facility]/ will follow up afterwards.)

## 2021-01-21 ENCOUNTER — PATIENT MESSAGE (OUTPATIENT)
Dept: INTERNAL MEDICINE CLINIC | Facility: CLINIC | Age: 66
End: 2021-01-21

## 2021-02-10 DIAGNOSIS — F32.A MILD DEPRESSION: ICD-10-CM

## 2021-02-10 RX ORDER — DULOXETIN HYDROCHLORIDE 60 MG/1
60 CAPSULE, DELAYED RELEASE ORAL DAILY
Qty: 30 CAPSULE | Refills: 1 | Status: SHIPPED | OUTPATIENT
Start: 2021-02-10 | End: 2021-03-13

## 2021-03-09 DIAGNOSIS — Z23 NEED FOR VACCINATION: ICD-10-CM

## 2021-03-12 DIAGNOSIS — F32.A MILD DEPRESSION: ICD-10-CM

## 2021-03-12 NOTE — TELEPHONE ENCOUNTER
Last Ov: 8/20/20, TB, CPE  Last labs: CK, LDH, Ferritin, CRP, CMP, CBC 1/19/21  Last Rx: Duloxetine 60mg, #30, 1R 2/10/21    Future Appointments   Date Time Provider Radha Willoughby   4/2/2021 10:00 AM Ming Kam PULMONARY RN SB [facility]UL[facility] [address] [facility]

## 2021-03-13 RX ORDER — DULOXETIN HYDROCHLORIDE 60 MG/1
CAPSULE, DELAYED RELEASE ORAL
Qty: 30 CAPSULE | Refills: 1 | Status: SHIPPED | OUTPATIENT
Start: 2021-03-13 | End: 2021-06-09 | Stop reason: CLARIF

## 2021-04-21 ENCOUNTER — IMMUNIZATION (OUTPATIENT)
Dept: LAB | Facility: HOSPITAL | Age: 66
End: 2021-04-21
Attending: HOSPITALIST
Payer: MEDICARE

## 2021-04-21 DIAGNOSIS — Z23 NEED FOR VACCINATION: Primary | ICD-10-CM

## 2021-04-21 PROCEDURE — 0011A SARSCOV2 VAC 100MCG/0.5ML IM: CPT

## 2021-05-06 DIAGNOSIS — E78.5 DYSLIPIDEMIA: ICD-10-CM

## 2021-05-06 RX ORDER — PRAVASTATIN SODIUM 80 MG/1
TABLET ORAL
Qty: 90 TABLET | Refills: 1 | Status: SHIPPED | OUTPATIENT
Start: 2021-05-06 | End: 2021-06-09 | Stop reason: CLARIF

## 2021-05-06 RX ORDER — BUDESONIDE AND FORMOTEROL FUMARATE DIHYDRATE 160; 4.5 UG/1; UG/1
AEROSOL RESPIRATORY (INHALATION)
Qty: 1 INHALER | Refills: 1 | Status: SHIPPED | OUTPATIENT
Start: 2021-05-06 | End: 2021-06-09 | Stop reason: CLARIF

## 2021-05-06 NOTE — TELEPHONE ENCOUNTER
Last Ov: 8/20/20, TB, CPE  Last labs: CK, LDH, Ferritin, CMP, CBC, CRP 1/19/21  Last Rx: budesonide-formoterol 150/4.5mg/act, #3, 3R 12/9/19    Future Appointments   Date Time Provider Radha Willoughby   5/19/2021  6:50 AM [address]

## 2021-05-19 ENCOUNTER — IMMUNIZATION (OUTPATIENT)
Dept: LAB | Facility: HOSPITAL | Age: 66
End: 2021-05-19
Attending: EMERGENCY MEDICINE
Payer: MEDICARE

## 2021-05-19 DIAGNOSIS — Z23 NEED FOR VACCINATION: Primary | ICD-10-CM

## 2021-05-19 PROCEDURE — 0012A SARSCOV2 VAC 100MCG/0.5ML IM: CPT

## 2021-06-09 ENCOUNTER — APPOINTMENT (OUTPATIENT)
Dept: GENERAL RADIOLOGY | Facility: HOSPITAL | Age: 66
End: 2021-06-09
Attending: EMERGENCY MEDICINE
Payer: MEDICARE

## 2021-06-09 ENCOUNTER — APPOINTMENT (OUTPATIENT)
Dept: CT IMAGING | Facility: HOSPITAL | Age: 66
End: 2021-06-09
Attending: EMERGENCY MEDICINE
Payer: MEDICARE

## 2021-06-09 ENCOUNTER — HOSPITAL ENCOUNTER (OUTPATIENT)
Facility: HOSPITAL | Age: 66
Setting detail: OBSERVATION
Discharge: HOME | End: 2021-06-10
Attending: EMERGENCY MEDICINE | Admitting: HOSPITALIST
Payer: MEDICARE

## 2021-06-09 DIAGNOSIS — S09.90XA INJURY OF HEAD, INITIAL ENCOUNTER: ICD-10-CM

## 2021-06-09 DIAGNOSIS — I60.9 SAH (SUBARACHNOID HEMORRHAGE) (HCC): Primary | ICD-10-CM

## 2021-06-09 PROCEDURE — 72110 X-RAY EXAM L-2 SPINE 4/>VWS: CPT | Performed by: EMERGENCY MEDICINE

## 2021-06-09 PROCEDURE — 99220 INITIAL OBSERVATION CARE,LEVL III: CPT | Performed by: HOSPITALIST

## 2021-06-09 PROCEDURE — 70450 CT HEAD/BRAIN W/O DYE: CPT | Performed by: EMERGENCY MEDICINE

## 2021-06-09 RX ORDER — ONDANSETRON 2 MG/ML
4 INJECTION INTRAMUSCULAR; INTRAVENOUS EVERY 6 HOURS PRN
Status: DISCONTINUED | OUTPATIENT
Start: 2021-06-09 | End: 2021-06-10

## 2021-06-09 RX ORDER — ACETAMINOPHEN 500 MG
1000 TABLET ORAL ONCE
Status: COMPLETED | OUTPATIENT
Start: 2021-06-09 | End: 2021-06-09

## 2021-06-09 RX ORDER — PRAVASTATIN SODIUM 80 MG/1
80 TABLET ORAL NIGHTLY
COMMUNITY
End: 2021-11-03

## 2021-06-09 RX ORDER — BUDESONIDE AND FORMOTEROL FUMARATE DIHYDRATE 160; 4.5 UG/1; UG/1
2 AEROSOL RESPIRATORY (INHALATION) 2 TIMES DAILY
COMMUNITY

## 2021-06-09 RX ORDER — ATORVASTATIN CALCIUM 20 MG/1
20 TABLET, FILM COATED ORAL NIGHTLY
Status: DISCONTINUED | OUTPATIENT
Start: 2021-06-09 | End: 2021-06-10

## 2021-06-09 RX ORDER — DULOXETIN HYDROCHLORIDE 60 MG/1
60 CAPSULE, DELAYED RELEASE ORAL DAILY
COMMUNITY
End: 2021-08-05

## 2021-06-09 RX ORDER — ALBUTEROL SULFATE 90 UG/1
2 AEROSOL, METERED RESPIRATORY (INHALATION) EVERY 4 HOURS PRN
Status: DISCONTINUED | OUTPATIENT
Start: 2021-06-09 | End: 2021-06-10

## 2021-06-09 RX ORDER — LEVOTHYROXINE SODIUM 0.07 MG/1
75 TABLET ORAL
Status: DISCONTINUED | OUTPATIENT
Start: 2021-06-10 | End: 2021-06-10

## 2021-06-09 RX ORDER — DULOXETIN HYDROCHLORIDE 30 MG/1
60 CAPSULE, DELAYED RELEASE ORAL DAILY
Status: DISCONTINUED | OUTPATIENT
Start: 2021-06-09 | End: 2021-06-10

## 2021-06-09 RX ORDER — IBUPROFEN 600 MG/1
600 TABLET ORAL ONCE
Status: DISCONTINUED | OUTPATIENT
Start: 2021-06-09 | End: 2021-06-09

## 2021-06-09 RX ORDER — ONDANSETRON 2 MG/ML
4 INJECTION INTRAMUSCULAR; INTRAVENOUS EVERY 4 HOURS PRN
Status: DISCONTINUED | OUTPATIENT
Start: 2021-06-09 | End: 2021-06-09

## 2021-06-09 RX ORDER — METOCLOPRAMIDE HYDROCHLORIDE 5 MG/ML
10 INJECTION INTRAMUSCULAR; INTRAVENOUS EVERY 8 HOURS PRN
Status: DISCONTINUED | OUTPATIENT
Start: 2021-06-09 | End: 2021-06-10

## 2021-06-09 RX ORDER — ACETAMINOPHEN 325 MG/1
650 TABLET ORAL EVERY 6 HOURS PRN
Status: DISCONTINUED | OUTPATIENT
Start: 2021-06-09 | End: 2021-06-10

## 2021-06-09 NOTE — TELEPHONE ENCOUNTER
TCB to schedule Supervisit on pt's cell  Called home number and gentlemen named Amelie Ryder answered and said he stated he just dropped her off at the ER at THE [facility] she fell and has a bump on her head. He will have her call once she is home.

## 2021-06-09 NOTE — ED PROVIDER NOTES
Patient Seen in: [facility] Emergency Department      History   Patient presents with:  Fall    Stated Complaint: fall, hematoma to head, no LOC no blood thinner     HPI/Subjective:   HPI    Patient is a pleasant 68-year-old female, presenting for e Years: 15.00        Pack years: 22.5        Types: Cigarettes        Start date: 1970        Quit date: 1985        Years since quittin.8      Smokeless tobacco: Never Used    Vaping Use      Vaping Use: Never used    Alcohol use: Yes      C created for panel order CBC With Differential With Platelet.   Procedure                               Abnormality         Status                     ---------                               -----------         ------                     CBC W/ DIFFERENTIAL[ ([address]) which includes the Dose Index Registry. PATIENT STATED HISTORY: (As transcribed by Technologist)  Patient fell backwards and hit back of head, headache.     FINDINGS:  VENTRICLES/SULCI:  Ventricles and sulci are normal in testing. Patient verbalizes understanding and is comfortable with the plan as recommended. Remainder of the patient's emergency room stay was without incident.     Admission disposition: 6/9/2021  4:29 PM                            Disposition and Plan

## 2021-06-09 NOTE — ED INITIAL ASSESSMENT (HPI)
Pt slipped and fell, pt states hitting the back of her head on the stairs. Pt denies LOC. Pt c/o dizziness and a headache. Pt states she is not on blood thinners. Pt c/o lower back pain.

## 2021-06-10 ENCOUNTER — APPOINTMENT (OUTPATIENT)
Dept: CT IMAGING | Facility: HOSPITAL | Age: 66
End: 2021-06-10
Attending: NURSE PRACTITIONER
Payer: MEDICARE

## 2021-06-10 VITALS
HEIGHT: 66 IN | BODY MASS INDEX: 30.4 KG/M2 | SYSTOLIC BLOOD PRESSURE: 132 MMHG | RESPIRATION RATE: 18 BRPM | HEART RATE: 55 BPM | DIASTOLIC BLOOD PRESSURE: 70 MMHG | OXYGEN SATURATION: 98 % | WEIGHT: 189.13 LBS | TEMPERATURE: 99 F

## 2021-06-10 PROCEDURE — 99217 OBSERVATION CARE DISCHARGE: CPT | Performed by: HOSPITALIST

## 2021-06-10 PROCEDURE — 99203 OFFICE O/P NEW LOW 30 MIN: CPT | Performed by: NURSE PRACTITIONER

## 2021-06-10 PROCEDURE — 70450 CT HEAD/BRAIN W/O DYE: CPT | Performed by: NURSE PRACTITIONER

## 2021-06-10 RX ORDER — ACETAMINOPHEN 500 MG
1000 TABLET ORAL EVERY 6 HOURS PRN
Status: DISCONTINUED | OUTPATIENT
Start: 2021-06-10 | End: 2021-06-10

## 2021-06-10 NOTE — PLAN OF CARE
Received pt at 1930  Pt AOx4, NSR, RA, VSS  Baseline neuro. No deficits  D/c Planning: Repeat CT head, PT/OT eval  Call light within reach.  All needs currently met      Problem: Patient/Family Goals  Goal: Patient/Family Long Term Goal  Description: [place]

## 2021-06-10 NOTE — H&P
[facility] HOSPITALIST  History and Physical     Anjelica Level Patient Status:  Observation    1955 MRN [id number]   [facility] 7NE-A Attending Wesly Flood MD   Hosp Day # 0 PCP Kulwinder Mitchell MD     Chief Complaint: s/p fall, ernesto right knee torn meniscus   • SKIN SURGERY  10/26/2020    BCC Left nasal ala mohs by Dr Chris Núñez       Social History:  reports that she quit smoking about 35 years ago. Her smoking use included cigarettes. She started smoking about 51 years ago.  She has a Wheezing or Shortness of Breath., Disp: 1 Inhaler, Rfl: 3  Tiotropium Bromide Monohydrate (SPIRIVA RESPIMAT) 1.25 MCG/ACT Inhalation Aero Soln, Inhale 2 sprays into the lungs daily. , Disp: 3 Inhaler, Rfl: 2  Metoprolol Tartrate 50 MG Oral Tab, Take 0.5 tab WBC 9.1   HGB 14.3   MCV 87.1   .0   INR 0.92       Recent Labs   Lab 06/09/21  1628   GLU 93   BUN 18   CREATSERUM 0.81   GFRAA 86   GFRNAA 75   CA 9.3   ALB 4.0      K 4.3      CO2 29.0   ALKPHO 90   AST 24   ALT 29   BILT 0.6   TP 7

## 2021-06-10 NOTE — OCCUPATIONAL THERAPY NOTE
OCCUPATIONAL THERAPY QUICK EVALUATION - INPATIENT    Room Number: 1176/5794-D  Evaluation Date: 6/10/2021     Type of Evaluation: Quick Eval  Presenting Problem: fall, [address]    Physician Order: IP Consult to Occupational Therapy  Reason for Therapy:  ADL/IAD Tendonitis of ankle, left        Past Surgical History  Past Surgical History:   Procedure Laterality Date   • COLONOSCOPY  2002   • COLONOSCOPY  6/28/2013    Procedure: COLONOSCOPY;  Surgeon: Wesly Lazo MD;  Location: [facility] ENDOSCOPY   • EXCIS PRIMARY None  -   Putting on and taking off regular upper body clothing?: None  -   Taking care of personal grooming such as brushing teeth?: None  -   Eating meals?: None    AM-PAC Score:  Score: 24  Approx Degree of Impairment: 0%  Standardized Score (AM-PAC Sca limited treatment options    Overall Complexity  LOW     OT Discharge Recommendations: Home       PLAN   Patient has been evaluated and presents with no skilled Occupational Therapy needs at this time.   Patient discharged from Occupational Therapy services

## 2021-06-10 NOTE — PHYSICAL THERAPY NOTE
PHYSICAL THERAPY QUICK EVALUATION - INPATIENT    Room Number: 4779/5685-L  Evaluation Date: 6/10/2021  Presenting Problem: [facility]  Physician Order: PT Eval and Treat    History related current admission: Patient is a 74year old female admitted on 6/9/2021 COLONOSCOPY;  Surgeon: Zoë Elizondo MD;  Location: [facility] ENDOSCOPY   • EXCIS PRIMARY GANGLION WRIST      on right by Charlotte 38    right inguinal   • KNEE ARTHROSCOPY  2009    right knee torn meniscus   • SKIN SURGERY  10/26/2020    B from another person does the patient currently need. ..   -   Moving to and from a bed to a chair (including a wheelchair)?: None   -   Need to walk in hospital room?: None   -   Climbing 3-5 steps with a railing?: A Little       AM-PAC Score:  Raw Score: 2 needs met, and questions answered. Patient End of Session: In bed;Needs met;Call light within reach;RN aware of session/findings; All patient questions and concerns addressed    ASSESSMENT   Patient is a 74year old female admitted on 6/9/2021 for [facility]

## 2021-06-10 NOTE — CONSULTS
[facility]  Neurosurgery Consult    Nettanash Amador Patient Status:  Observation    1955 MRN [id number]   [facility] 7NE-A Attending Kings Rose MD   Hosp Day # 0 PCP Umu Horowitz MD     REASON FOR CONSULTATION:  Traumatic right knee torn meniscus   • SKIN SURGERY  10/26/2020    BCC Left nasal ala mohs by Dr April Fong:  family history includes Breast Cancer (age of onset: 48) in her maternal cousin female; Breast Cancer (age of onset: 61) in her mother; Kathy Liu Take 0.5 tablets (25 mg total) by mouth daily.  TAKE 1/2 TABLET BY MOUTH EVERY DAY, Disp: 45 tablet, Rfl: 1, 6/8/2021 at 0900  aspirin 81 MG Oral Chew Tab, Chew 81 mg by mouth every morning.  , Disp: , Rfl: , Past Week at Unknown time  Levothyroxine Sodium PERRLA +3 brisk,  EOMI. Face is symmetrical. CN's GI. Sensation to light touch is intact bilaterally. No Pronator Drift. Strengths 5/5 bilaterally. Gait deferred.     CV: NSR  RESPIRATORY:  Nonlabored on RA  INTEGUMENTARY:  Warm, dry, small palpable so  There is no midline shift or mass-effect.  The basal cisterns are patent.  The gray-white matter differentiation is intact.       Stable small amount of subarachnoid hemorrhage along the high left frontal sulci.  Occipital scalp hematoma again noted.

## 2021-06-10 NOTE — PROGRESS NOTES
[facility] H[facility]PITAL[facility]  Progress Note     Anais Peoples Patient Status:  Observation    1955 MRN [id number]   [facility] 7NE-A Attending Andreas Santiago MD   Hosp Day # 0 PCP Lolis Bello MD     Chief Complaint: fall   S:  HA otherw Fluticasone Furoate-Vilanterol  1 puff Inhalation Daily   • atorvastatin  20 mg Oral Nightly   • Metoprolol Tartrate  25 mg Oral Daily   • Levothyroxine Sodium  75 mcg Oral Before breakfast   • DULoxetine HCl  60 mg Oral Daily   • Umeclidinium Bromide  1 p

## 2021-06-10 NOTE — PLAN OF CARE
CT stable. Pt cleared for dc by neuro sx, PT rec home. NURSING DISCHARGE NOTE    Discharged Home via Wheelchair. Accompanied by Spouse  Belongings Taken by patient/family. IV removed. Dc instructions and f/u appts discussed. All questions answered.

## 2021-06-11 ENCOUNTER — PATIENT OUTREACH (OUTPATIENT)
Dept: CASE MANAGEMENT | Age: 66
End: 2021-06-11

## 2021-06-11 ENCOUNTER — TELEPHONE (OUTPATIENT)
Dept: INTERNAL MEDICINE CLINIC | Facility: CLINIC | Age: 66
End: 2021-06-11

## 2021-06-11 DIAGNOSIS — E78.5 DYSLIPIDEMIA: ICD-10-CM

## 2021-06-11 DIAGNOSIS — E03.8 OTHER SPECIFIED HYPOTHYROIDISM: ICD-10-CM

## 2021-06-11 DIAGNOSIS — Z02.9 ENCOUNTERS FOR ADMINISTRATIVE PURPOSE: ICD-10-CM

## 2021-06-11 DIAGNOSIS — Z00.00 ROUTINE GENERAL MEDICAL EXAMINATION AT A HEALTH CARE FACILITY: Primary | ICD-10-CM

## 2021-06-11 DIAGNOSIS — I10 ESSENTIAL HYPERTENSION: ICD-10-CM

## 2021-06-11 PROCEDURE — 1111F DSCHRG MED/CURRENT MED MERGE: CPT

## 2021-06-11 NOTE — DISCHARGE SUMMARY
[facility]PITAL[facility]  DISCHARGE SUMMARY     Grant Hernandez Patient Status:  Observation    1955 MRN [id number]   [facility] 7NE-A Attending No att. providers found   Hosp Day # 0 PCP Ventura Steen MD     Date of Admission: 2021  D neurosurgery.     Procedures during hospitalization:   • None    Incidental or significant findings and recommendations (brief descriptions):  • Per Brief Synopsis of Hospital Course    Lab/Test results pending at Discharge:   · None    Consultants:  • Sherry Brady as possible for a visit in 1 week        -----------------------------------------------------------------------------------------------  PATIENT DISCHARGE INSTRUCTIONS: See electronic chart    Jyoti Melgar MD 6/11/2021    Time spent:  > 30 minutes

## 2021-06-11 NOTE — TELEPHONE ENCOUNTER
Supervisit   Future Appointments   Date Time Provider Radha Gonzalezi   8/12/2021  2:20 PM Wagner Rios MD [facility]        Orders to   Katiana Grad   aware must fast no call back required

## 2021-06-11 NOTE — PROGRESS NOTES
Initial Post Discharge Follow Up   Discharge Date: 6/10/21  Contact Date: 6/11/2021    Consent Verification:  Assessment Completed With: Patient  HIPAA Verified?   Yes    Discharge Dx:   SAH (subarachnoid hemorrhage)    Was TCC ordered: yes       General • Tiotropium Bromide Monohydrate (SPIRIVA RESPIMAT) 1.25 MCG/ACT Inhalation Aero Soln Inhale 2 sprays into the lungs daily. 3 Inhaler 2   • Metoprolol Tartrate 50 MG Oral Tab Take 0.5 tablets (25 mg total) by mouth daily.  TAKE 1/2 TABLET BY MOUTH EVERY D EDWARD Chen [facility] [address] [id number]  [address]  [place] [id number] [phone]          PCP TCM/HFU appointment: scheduled at

## 2021-06-14 ENCOUNTER — OFFICE VISIT (OUTPATIENT)
Dept: INTERNAL MEDICINE CLINIC | Facility: CLINIC | Age: 66
End: 2021-06-14
Payer: MEDICARE

## 2021-06-14 VITALS
BODY MASS INDEX: 30.17 KG/M2 | SYSTOLIC BLOOD PRESSURE: 128 MMHG | TEMPERATURE: 97 F | HEART RATE: 92 BPM | OXYGEN SATURATION: 98 % | RESPIRATION RATE: 18 BRPM | HEIGHT: 66.5 IN | WEIGHT: 190 LBS | DIASTOLIC BLOOD PRESSURE: 88 MMHG

## 2021-06-14 DIAGNOSIS — M54.50 ACUTE MIDLINE LOW BACK PAIN WITHOUT SCIATICA: ICD-10-CM

## 2021-06-14 DIAGNOSIS — I60.9 SAH (SUBARACHNOID HEMORRHAGE) (HCC): Primary | ICD-10-CM
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
## 2021-06-14 DIAGNOSIS — S09.90XD INJURY OF HEAD, SUBSEQUENT ENCOUNTER: ICD-10-CM
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
## 2021-06-14 PROBLEM — S09.90XA HEAD INJURY: Status: ACTIVE | Noted: 2021-06-14

## 2021-06-14 PROCEDURE — 3079F DIAST BP 80-89 MM HG: CPT | Performed by: NURSE PRACTITIONER

## 2021-06-14 PROCEDURE — 3008F BODY MASS INDEX DOCD: CPT | Performed by: NURSE PRACTITIONER

## 2021-06-14 PROCEDURE — 99495 TRANSJ CARE MGMT MOD F2F 14D: CPT | Performed by: NURSE PRACTITIONER

## 2021-06-14 PROCEDURE — 3074F SYST BP LT 130 MM HG: CPT | Performed by: NURSE PRACTITIONER

## 2021-06-14 NOTE — PROGRESS NOTES
Raj Chahal 6      HISTORY   CHIEF COMPLAINT: SAH due to fall and head trauma, and lower back pain.      HPI: Anu Amaral is a 74year old female here today for hospital follow up for [facility] due to fall and head tra of Breath., Disp: 1 Inhaler, Rfl: 3  Tiotropium Bromide Monohydrate (SPIRIVA RESPIMAT) 1.25 MCG/ACT Inhalation Aero Soln, Inhale 2 sprays into the lungs daily. , Disp: 3 Inhaler, Rfl: 2  Metoprolol Tartrate 50 MG Oral Tab, Take 0.5 tablets (25 mg total) by nasal ala mohs by Dr Carr Kevon      Family History   Problem Relation Age of Onset   • Heart Disease Brother    • High Blood Pressure Brother    • Cancer Brother 79        2 cancerous growths removed arm   • Other (heart diseases) Brother    • Heart Disease Br Finalized by (CST): Phylicia Meza MD on 6/10/2021 at 9:15 AM       CT BRAIN OR HEAD (24745)    Result Date: 6/9/2021  CONCLUSION:  There is a rounded area of hyperdensity overlying the left frontal lobe which most likely represents a small amount of suba 11/20/2020, 12/18/2020, 01/29/2021, 03/01/2021, 04/02/2021, 04/30/2021, 06/01/2021   • Pneumococcal (Prevnar 13) 10/24/2018   • Pneumovax 23 10/25/2019   • TDAP 07/16/2014, 08/01/2016   • Zoster Vaccine Live (Zostavax) 10/15/2014, 01/08/2015   • Zoster Vac Tylenol ES PRN  · Continue to move around and avoid sitting for long periods  · If no Improvement follow-up with PCP      No orders of the defined types were placed in this encounter.       Medications & Refills for this Visit:  DULoxetine HCl 60 MG Oral Ca visit, the following was completed:  ? Obtained and reviewed discharge information  and continuity of care documents  ?  Reviewed need for follow-up on pending tests, need for follow-up on diagnostic tests and need for follow-up on treatments  ? specialists

## 2021-06-14 NOTE — PROGRESS NOTES
TRANSITIONAL CARE CLINIC PHARMACIST MEDICATION RECONCILIATION        Vinh Lyon MRN [id number]    1955 PCP Meng Tiwari MD       Comments: Medication history completed by the [facility] Pharmacist with the patient in the offic AM  Transitional Care Clinic

## 2021-07-01 ENCOUNTER — OFFICE VISIT (OUTPATIENT)
Dept: INTERNAL MEDICINE CLINIC | Facility: CLINIC | Age: 66
End: 2021-07-01
Payer: MEDICARE

## 2021-07-01 VITALS
RESPIRATION RATE: 16 BRPM | WEIGHT: 189 LBS | SYSTOLIC BLOOD PRESSURE: 126 MMHG | DIASTOLIC BLOOD PRESSURE: 84 MMHG | BODY MASS INDEX: 30.02 KG/M2 | HEART RATE: 60 BPM | TEMPERATURE: 97 F | HEIGHT: 66.5 IN

## 2021-07-01 DIAGNOSIS — I60.9 SAH (SUBARACHNOID HEMORRHAGE) (HCC): Primary | ICD-10-CM

## 2021-07-01 DIAGNOSIS — I10 ESSENTIAL HYPERTENSION: ICD-10-CM

## 2021-07-01 DIAGNOSIS — M54.2 POSTERIOR NECK PAIN: ICD-10-CM

## 2021-07-01 PROCEDURE — 3008F BODY MASS INDEX DOCD: CPT | Performed by: INTERNAL MEDICINE

## 2021-07-01 PROCEDURE — 3074F SYST BP LT 130 MM HG: CPT | Performed by: INTERNAL MEDICINE

## 2021-07-01 PROCEDURE — 1111F DSCHRG MED/CURRENT MED MERGE: CPT | Performed by: INTERNAL MEDICINE

## 2021-07-01 PROCEDURE — 3079F DIAST BP 80-89 MM HG: CPT | Performed by: INTERNAL MEDICINE

## 2021-07-01 PROCEDURE — 99214 OFFICE O/P EST MOD 30 MIN: CPT | Performed by: INTERNAL MEDICINE

## 2021-07-01 NOTE — PROGRESS NOTES
Maria T Hatr is a 74year old female. Patient presents with:  Hospital F/U: SN Rm 4; Edward 6/9-6/10, SAH,  ongoing head/neck ache      HPI:     Patient here for HFU. She was admitted 6/9 to 6/10 for SAH due to fall and head injury.  Patient had mopped 160-4.5 MCG/ACT Inhalation Aerosol Inhale 2 puffs into the lungs 2 (two) times daily. • Albuterol Sulfate  (90 Base) MCG/ACT Inhalation Aero Soln Inhale 2 puffs into the lungs every 4 (four) hours as needed for Wheezing or Shortness of Breath.  1 Comment: cage 2/28/19    Drug use: No    Family History   Problem Relation Age of Onset   • Heart Disease Brother    • High Blood Pressure Brother    • Cancer Brother 79        2 cancerous growths removed arm   • Other (heart diseases) Brother    • Heart D Alert and oriented, no focal deficits    ASSESSMENT AND PLAN:   Sah (subarachnoid hemorrhage) (hcc)  (due to fall)- symptoms improving, head CT stable in the hospital. Discussed she can continue tylenol ES prn HA for another 1-2 weeks.  Monitor for any red

## 2021-07-09 ENCOUNTER — TELEPHONE (OUTPATIENT)
Dept: INTERNAL MEDICINE CLINIC | Facility: CLINIC | Age: 66
End: 2021-07-09

## 2021-07-09 NOTE — TELEPHONE ENCOUNTER
Patient was in to see TB on 7/1. Patient has been to 3 different Dentists who have consulted with her on getting 2 of her teeth pulled. They do not want to give her any pain medication because she hasn't committed to them doing the work as yet.   Patient

## 2021-07-09 NOTE — TELEPHONE ENCOUNTER
Will need OV to prescribe ABX if needed, best thing for her to do is the dentist as this is a dental problem

## 2021-07-12 ENCOUNTER — OFFICE VISIT (OUTPATIENT)
Dept: INTERNAL MEDICINE CLINIC | Facility: CLINIC | Age: 66
End: 2021-07-12
Payer: MEDICARE

## 2021-07-12 VITALS
DIASTOLIC BLOOD PRESSURE: 84 MMHG | OXYGEN SATURATION: 96 % | TEMPERATURE: 99 F | HEIGHT: 66.5 IN | HEART RATE: 83 BPM | SYSTOLIC BLOOD PRESSURE: 136 MMHG | WEIGHT: 191.63 LBS | BODY MASS INDEX: 30.43 KG/M2

## 2021-07-12 DIAGNOSIS — I10 ESSENTIAL HYPERTENSION: ICD-10-CM

## 2021-07-12 DIAGNOSIS — K04.7 INFECTION OF TOOTH: Primary | ICD-10-CM

## 2021-07-12 PROCEDURE — 3075F SYST BP GE 130 - 139MM HG: CPT | Performed by: INTERNAL MEDICINE

## 2021-07-12 PROCEDURE — 3008F BODY MASS INDEX DOCD: CPT | Performed by: INTERNAL MEDICINE

## 2021-07-12 PROCEDURE — 3079F DIAST BP 80-89 MM HG: CPT | Performed by: INTERNAL MEDICINE

## 2021-07-12 PROCEDURE — 99213 OFFICE O/P EST LOW 20 MIN: CPT | Performed by: INTERNAL MEDICINE

## 2021-07-12 RX ORDER — CLINDAMYCIN HYDROCHLORIDE 300 MG/1
300 CAPSULE ORAL 3 TIMES DAILY
Qty: 30 CAPSULE | Refills: 0 | Status: SHIPPED | OUTPATIENT
Start: 2021-07-12 | End: 2021-12-01

## 2021-07-12 NOTE — TELEPHONE ENCOUNTER
Patient indicates she does not have an assigned dentist as she had only a few consultations with different appointments. Patient scheduled today with TB for evaluation. LOV with TB 7/1/2021 for SAH, HTN, neck pain post fall.      FYI TB.

## 2021-07-12 NOTE — TELEPHONE ENCOUNTER
CBC and CMP done 6/2021  Lipid and TSH w Ref ordered for THE [facility] per protocol

## 2021-07-12 NOTE — PROGRESS NOTES
Nehemias Page is a 74year old female. Patient presents with:  Tooth Ache: mn room 3 pt here for tooth pain       HPI:     Patient here for tooth infection/pain.  Does not have a dentist that she follows with regularly, shopping around as 3 molars are i Albuterol Sulfate  (90 Base) MCG/ACT Inhalation Aero Soln Inhale 2 puffs into the lungs every 4 (four) hours as needed for Wheezing or Shortness of Breath.  1 Inhaler 3   • Tiotropium Bromide Monohydrate (SPIRIVA RESPIMAT) 1.25 MCG/ACT Inhalation Aer Heart Disease Brother    • High Blood Pressure Brother    • Cancer Brother 79        2 cancerous growths removed arm   • Other (heart diseases) Brother    • Heart Disease Brother    • High Blood Pressure Brother    • Cancer Brother 79        scalp cancerou she has recovered from [facility] in June  Essential hypertension- controlled, CPM    No orders of the defined types were placed in this encounter.       Meds & Refills for this Visit:  Requested Prescriptions     Signed Prescriptions Disp Refills   • Clinda High Dose Vitamin A Counseling: Side effects reviewed, pt to contact office should one occur.

## 2021-08-04 PROBLEM — IMO0002 DISORDER OF ROTATOR CUFF SYNDROME OF RIGHT SHOULDER AND ALLIED DISORDER: Status: ACTIVE | Noted: 2021-08-04

## 2021-08-05 ENCOUNTER — LAB ENCOUNTER (OUTPATIENT)
Dept: LAB | Age: 66
End: 2021-08-05
Attending: INTERNAL MEDICINE
Payer: MEDICARE

## 2021-08-05 DIAGNOSIS — E78.5 DYSLIPIDEMIA: ICD-10-CM

## 2021-08-05 DIAGNOSIS — E03.8 OTHER SPECIFIED HYPOTHYROIDISM: ICD-10-CM

## 2021-08-05 DIAGNOSIS — Z00.00 ROUTINE GENERAL MEDICAL EXAMINATION AT A HEALTH CARE FACILITY: ICD-10-CM

## 2021-08-05 DIAGNOSIS — I10 ESSENTIAL HYPERTENSION: ICD-10-CM

## 2021-08-05 LAB
CHOLEST SMN-MCNC: 249 MG/DL (ref ?–200)
HDLC SERPL-MCNC: 46 MG/DL (ref 40–59)
LDLC SERPL CALC-MCNC: 189 MG/DL (ref ?–100)
NONHDLC SERPL-MCNC: 203 MG/DL (ref ?–130)
PATIENT FASTING Y/N/NP: YES
TRIGL SERPL-MCNC: 81 MG/DL (ref 30–149)
TSI SER-ACNC: 1.11 MIU/ML (ref 0.36–3.74)
VLDLC SERPL CALC-MCNC: 17 MG/DL (ref 0–30)

## 2021-08-05 PROCEDURE — 36415 COLL VENOUS BLD VENIPUNCTURE: CPT

## 2021-08-05 PROCEDURE — 84443 ASSAY THYROID STIM HORMONE: CPT

## 2021-08-05 PROCEDURE — 80061 LIPID PANEL: CPT

## 2021-08-05 RX ORDER — METOPROLOL TARTRATE 50 MG/1
TABLET, FILM COATED ORAL
Qty: 45 TABLET | Refills: 0 | Status: SHIPPED | OUTPATIENT
Start: 2021-08-05 | End: 2021-11-03

## 2021-08-05 RX ORDER — DULOXETIN HYDROCHLORIDE 60 MG/1
CAPSULE, DELAYED RELEASE ORAL
Qty: 30 CAPSULE | Refills: 0 | Status: SHIPPED | OUTPATIENT
Start: 2021-08-05 | End: 2021-09-07

## 2021-08-05 NOTE — TELEPHONE ENCOUNTER
Last VISIT 7/12/21 TB acute    Last REFILL duloxetine 60mg entered historically    Last LABS Lipid, TSH w Ref 8/5/21    Future Appointments   Date Time Provider Radha Willoughby   8/12/2021  2:20 PM Asha Salter MD [facility]   8/27/2021  9:3

## 2021-08-11 PROBLEM — G89.29 CHRONIC RIGHT SHOULDER PAIN: Status: ACTIVE | Noted: 2021-08-11

## 2021-08-11 PROBLEM — M25.511 CHRONIC RIGHT SHOULDER PAIN: Status: ACTIVE | Noted: 2021-08-11

## 2021-08-12 ENCOUNTER — OFFICE VISIT (OUTPATIENT)
Dept: INTERNAL MEDICINE CLINIC | Facility: CLINIC | Age: 66
End: 2021-08-12
Payer: MEDICARE

## 2021-08-12 VITALS
WEIGHT: 192.63 LBS | RESPIRATION RATE: 16 BRPM | BODY MASS INDEX: 30.96 KG/M2 | TEMPERATURE: 97 F | DIASTOLIC BLOOD PRESSURE: 68 MMHG | HEART RATE: 80 BPM | OXYGEN SATURATION: 97 % | HEIGHT: 66 IN | SYSTOLIC BLOOD PRESSURE: 118 MMHG

## 2021-08-12 DIAGNOSIS — E03.8 OTHER SPECIFIED HYPOTHYROIDISM: ICD-10-CM

## 2021-08-12 DIAGNOSIS — R23.2 HOT FLASHES: ICD-10-CM

## 2021-08-12 DIAGNOSIS — E78.5 DYSLIPIDEMIA: ICD-10-CM

## 2021-08-12 DIAGNOSIS — Z12.31 ENCOUNTER FOR SCREENING MAMMOGRAM FOR MALIGNANT NEOPLASM OF BREAST: ICD-10-CM

## 2021-08-12 DIAGNOSIS — I10 ESSENTIAL (PRIMARY) HYPERTENSION: ICD-10-CM

## 2021-08-12 DIAGNOSIS — I70.0 ATHEROSCLEROSIS OF AORTA (HCC): ICD-10-CM

## 2021-08-12 DIAGNOSIS — J82.83 EOSINOPHILIC ASTHMA: ICD-10-CM

## 2021-08-12 DIAGNOSIS — I60.9 SAH (SUBARACHNOID HEMORRHAGE) (HCC): ICD-10-CM

## 2021-08-12 DIAGNOSIS — Z00.00 ENCOUNTER FOR ANNUAL HEALTH EXAMINATION: Primary | ICD-10-CM

## 2021-08-12 PROBLEM — F32.A MILD DEPRESSION: Status: RESOLVED | Noted: 2020-08-21 | Resolved: 2021-08-12

## 2021-08-12 PROCEDURE — 96160 PT-FOCUSED HLTH RISK ASSMT: CPT | Performed by: INTERNAL MEDICINE

## 2021-08-12 PROCEDURE — 99397 PER PM REEVAL EST PAT 65+ YR: CPT | Performed by: INTERNAL MEDICINE

## 2021-08-12 PROCEDURE — G0439 PPPS, SUBSEQ VISIT: HCPCS | Performed by: INTERNAL MEDICINE

## 2021-08-12 PROCEDURE — 3078F DIAST BP <80 MM HG: CPT | Performed by: INTERNAL MEDICINE

## 2021-08-12 PROCEDURE — 3008F BODY MASS INDEX DOCD: CPT | Performed by: INTERNAL MEDICINE

## 2021-08-12 PROCEDURE — 3074F SYST BP LT 130 MM HG: CPT | Performed by: INTERNAL MEDICINE

## 2021-08-12 NOTE — PROGRESS NOTES
HPI:   Cindi Alvarez is a 74year old female who presents for a MA (Medicare Advantage) Supervisit (Once per calendar year). Encounter for annual health examination- referred for mammogram, she is utd on cscope.  dexa 2019 wnl, will do again next screening of functional status.    Hearing Problems?: Yes (bilateral hearing issues)      Depression Screening (PHQ-2/PHQ-9): Over the LAST 2 WEEKS   Little interest or pleasure in doing things: Not at all  Feeling down, depressed, or hopeless: Not at all rotator cuff syndrome of right shoulder and allied disorder     Chronic right shoulder pain    Wt Readings from Last 3 Encounters:  08/12/21 : 192 lb 9.6 oz (87.4 kg)  07/30/21 : 188 lb (85.3 kg)  07/12/21 : 191 lb 9.6 oz (86.9 kg)     Last Cholesterol Lab (2019), Chondromalacia of patella, Colon polyps (4/2013), Depression, Dyslipidemia, Essential hypertension, Family history of heart disease, Ganglion of right wrist, Hyperlipidemia, Hypothyroidism (7/2014), Inguinal hernia (1998), Menopausal symptoms, Obes Height as of this encounter: 5' 6\" (1.676 m). Weight as of this encounter: 192 lb 9.6 oz (87.4 kg).     Medicare Hearing Assessment  (Required for AWV/SWV)    {Hearing finger rub wnl     Visual Acuity grossly wnl  Right Eye Visual Acuity: Corrected Righ 10/02/2020   • Influenza 09/01/2009, 09/01/2010, 10/20/2015, 10/05/2016, 10/10/2018   • Influenza Vaccine, Preserv Free 10/02/2020   • Kenalog Per 10mg Inj 08/04/2021   • Mepolizumab 1 Mg 10/25/2019, 11/27/2019, 01/03/2020, 01/31/2020, 02/28/2020, 03/31/20 diet, lifestyle, and exercise. Return in about 6 months (around 2/12/2022), or if symptoms worsen or fail to improve, for f/u on chronic issues.      Arnulfo Ibarra MD, 8/12/2021     General Health     In the past six months, have you lost more than 10 p family history -     Colorectal Cancer Screening  Covered for ages 52-80; only need ONE of the following:    Colonoscopy   Covered every 10 years    Covered every 2 years if patient is at high risk or previous colonoscopy was abnormal 11/15/2018    Colonos

## 2021-08-12 NOTE — PATIENT INSTRUCTIONS
Katrina Mejia's SCREENING SCHEDULE   Tests on this list are recommended by your physician but may not be covered, or covered at this frequency, by your insurer. Please check with your insurance carrier before scheduling to verify coverage.    PREVENT 03/11/2019      No recommendations at this time   Pap and Pelvic    Pap   Covered every 2 years for women at normal risk;  Annually if at high risk -  No recommendations at this time    Chlamydia Annually if high risk -  No recommendations at this time   Sc regarding Advance Directives.

## 2021-09-07 RX ORDER — DULOXETIN HYDROCHLORIDE 60 MG/1
CAPSULE, DELAYED RELEASE ORAL
Qty: 30 CAPSULE | Refills: 2 | Status: SHIPPED | OUTPATIENT
Start: 2021-09-07 | End: 2022-01-03

## 2021-09-07 NOTE — TELEPHONE ENCOUNTER
Last VISIT 08/12/21    Last CPE 08/12/21    Last REFILL 08/05/21 qty 30 w/0 refills    Last LABS 08/05/21 Lipid, TSH done    No Future Appointments      Per PROTOCOL? Not on protocol      Please Approve or Deny.

## 2021-11-03 DIAGNOSIS — I10 ESSENTIAL HYPERTENSION: ICD-10-CM

## 2021-11-03 RX ORDER — METOPROLOL TARTRATE 50 MG/1
TABLET, FILM COATED ORAL
Qty: 45 TABLET | Refills: 1 | Status: SHIPPED | OUTPATIENT
Start: 2021-11-03

## 2021-11-03 RX ORDER — PRAVASTATIN SODIUM 80 MG/1
TABLET ORAL
Qty: 90 TABLET | Refills: 1 | Status: SHIPPED | OUTPATIENT
Start: 2021-11-03 | End: 2022-01-18 | Stop reason: ALTCHOICE

## 2021-11-08 ENCOUNTER — HOSPITAL ENCOUNTER (OUTPATIENT)
Dept: MAMMOGRAPHY | Age: 66
Discharge: HOME | End: 2021-11-08
Attending: INTERNAL MEDICINE
Payer: MEDICARE

## 2021-11-08 DIAGNOSIS — Z12.31 ENCOUNTER FOR SCREENING MAMMOGRAM FOR MALIGNANT NEOPLASM OF BREAST: ICD-10-CM

## 2021-11-08 PROCEDURE — 77067 SCR MAMMO BI INCL CAD: CPT | Performed by: INTERNAL MEDICINE

## 2021-11-08 PROCEDURE — 77063 BREAST TOMOSYNTHESIS BI: CPT | Performed by: INTERNAL MEDICINE

## 2021-11-23 ENCOUNTER — HOSPITAL ENCOUNTER (EMERGENCY)
Facility: HOSPITAL | Age: 66
Discharge: HOME | End: 2021-11-23
Attending: EMERGENCY MEDICINE
Payer: MEDICARE

## 2021-11-23 ENCOUNTER — APPOINTMENT (OUTPATIENT)
Dept: CT IMAGING | Facility: HOSPITAL | Age: 66
End: 2021-11-23
Attending: EMERGENCY MEDICINE
Payer: MEDICARE

## 2021-11-23 VITALS
WEIGHT: 188 LBS | HEART RATE: 69 BPM | SYSTOLIC BLOOD PRESSURE: 158 MMHG | RESPIRATION RATE: 16 BRPM | OXYGEN SATURATION: 95 % | BODY MASS INDEX: 30 KG/M2 | DIASTOLIC BLOOD PRESSURE: 88 MMHG | TEMPERATURE: 98 F

## 2021-11-23 DIAGNOSIS — K42.9 UMBILICAL HERNIA WITHOUT OBSTRUCTION AND WITHOUT GANGRENE: Primary | ICD-10-CM

## 2021-11-23 DIAGNOSIS — N83.201 RIGHT OVARIAN CYST: ICD-10-CM

## 2021-11-23 PROCEDURE — 99284 EMERGENCY DEPT VISIT MOD MDM: CPT

## 2021-11-23 PROCEDURE — 74177 CT ABD & PELVIS W/CONTRAST: CPT | Performed by: EMERGENCY MEDICINE

## 2021-11-23 PROCEDURE — 85025 COMPLETE CBC W/AUTO DIFF WBC: CPT | Performed by: EMERGENCY MEDICINE

## 2021-11-23 PROCEDURE — 81001 URINALYSIS AUTO W/SCOPE: CPT | Performed by: EMERGENCY MEDICINE

## 2021-11-23 PROCEDURE — 83690 ASSAY OF LIPASE: CPT | Performed by: EMERGENCY MEDICINE

## 2021-11-23 PROCEDURE — 80053 COMPREHEN METABOLIC PANEL: CPT | Performed by: EMERGENCY MEDICINE

## 2021-11-23 PROCEDURE — 87086 URINE CULTURE/COLONY COUNT: CPT | Performed by: EMERGENCY MEDICINE

## 2021-11-23 PROCEDURE — 96361 HYDRATE IV INFUSION ADD-ON: CPT

## 2021-11-23 PROCEDURE — 96360 HYDRATION IV INFUSION INIT: CPT

## 2021-11-23 RX ORDER — SODIUM CHLORIDE 9 MG/ML
125 INJECTION, SOLUTION INTRAVENOUS CONTINUOUS
Status: DISCONTINUED | OUTPATIENT
Start: 2021-11-23 | End: 2021-11-23

## 2021-11-23 RX ORDER — SODIUM CHLORIDE 9 MG/ML
INJECTION, SOLUTION INTRAVENOUS CONTINUOUS
Status: DISCONTINUED | OUTPATIENT
Start: 2021-11-23 | End: 2021-11-24

## 2021-11-24 NOTE — ED PROVIDER NOTES
Patient Seen in: [facility] Emergency Department      History   Patient presents with:  Abdomen/Flank Pain    Stated Complaint: abd pain     Subjective:   HPI    77-year-old female presents emergency room with chief complaint of abdominal pain.   Elysia Redd as noted above.     Physical Exam     ED Triage Vitals   BP 11/23/21 1809 (!) 180/96   Pulse 11/23/21 1809 80   Resp 11/23/21 1809 18   Temp 11/23/21 1809 97.6 °F (36.4 °C)   Temp src 11/23/21 1809 Tympanic   SpO2 11/23/21 1809 97 %   O2 Device 11/23/21 190 gentle pressure. After procedure patient states pain resolved and she was feeling much better. Will check CT for further evaluation. MDM      Patient IV established, blood work obtained.   CT performed which revealed umbilical hernia containing lo

## 2021-11-29 ENCOUNTER — TELEPHONE (OUTPATIENT)
Dept: INTERNAL MEDICINE CLINIC | Facility: CLINIC | Age: 66
End: 2021-11-29

## 2021-11-29 DIAGNOSIS — K42.9 UMBILICAL HERNIA WITHOUT OBSTRUCTION AND WITHOUT GANGRENE: Primary | ICD-10-CM

## 2021-11-29 NOTE — TELEPHONE ENCOUNTER
Message to referral team sent. Patient scheduled today - attempting expedited referral process given appointment today.

## 2021-11-29 NOTE — TELEPHONE ENCOUNTER
Pt is calling to see who TB recommends for general surgery for the umbilical issue and gynecologist for the ovarian cyst. Please advise     FYI: she is calling her insurance to see who is in her network but she would also prefer TB recommendations.

## 2021-11-29 NOTE — TELEPHONE ENCOUNTER
Patient calling back.   She has confirmed Dr. Vinod Gleason is in her network and she is scheduled to see him today 11/29/21 at 3pm.

## 2021-11-29 NOTE — TELEPHONE ENCOUNTER
Pt was in ED 64/02 for umbilical hernia. Referral for Dr. Zoila Thomas pended. Do you have any recommendations for gyne?      LOV 8/12/21 with TB.

## 2021-11-29 NOTE — TELEPHONE ENCOUNTER
Pt called to schedule Pre op Surgery on 12/3/21 needs EKG. I scheduled with SD for tomorrow. Please advise if a problem. Seem SD back in 2018. Please advise.      FYI:(pt informed if a problem with the appt we will call to r.s if no call ok to keep appt)

## 2021-11-29 NOTE — TELEPHONE ENCOUNTER
SD has not seen patient since 2018; please contact patient and schedule with TB- opening 40 minute appointment on Thursday.

## 2021-11-30 NOTE — TELEPHONE ENCOUNTER
Future Appointments   Date Time Provider Radha Fartun   12/2/2021  1:40 PM Carrillo Cardenas MD [facility]

## 2021-11-30 NOTE — TELEPHONE ENCOUNTER
Called Dr Zoila Thomas office to follow up on our pre op form. I spoke with Vandana she stated they did receive the form will fill it out today and fax it back to us.

## 2021-12-02 ENCOUNTER — TELEPHONE (OUTPATIENT)
Dept: INTERNAL MEDICINE CLINIC | Facility: CLINIC | Age: 66
End: 2021-12-02

## 2021-12-02 ENCOUNTER — OFFICE VISIT (OUTPATIENT)
Dept: INTERNAL MEDICINE CLINIC | Facility: CLINIC | Age: 66
End: 2021-12-02
Payer: MEDICARE

## 2021-12-02 VITALS
HEART RATE: 94 BPM | OXYGEN SATURATION: 95 % | DIASTOLIC BLOOD PRESSURE: 68 MMHG | RESPIRATION RATE: 18 BRPM | SYSTOLIC BLOOD PRESSURE: 134 MMHG | WEIGHT: 188.63 LBS | BODY MASS INDEX: 30.31 KG/M2 | TEMPERATURE: 98 F | HEIGHT: 66 IN

## 2021-12-02 DIAGNOSIS — N83.201 RIGHT OVARIAN CYST: ICD-10-CM

## 2021-12-02 DIAGNOSIS — K42.9 UMBILICAL HERNIA WITHOUT OBSTRUCTION AND WITHOUT GANGRENE: ICD-10-CM

## 2021-12-02 DIAGNOSIS — I10 PRIMARY HYPERTENSION: ICD-10-CM

## 2021-12-02 DIAGNOSIS — E78.5 DYSLIPIDEMIA: ICD-10-CM

## 2021-12-02 DIAGNOSIS — E03.8 OTHER SPECIFIED HYPOTHYROIDISM: ICD-10-CM

## 2021-12-02 DIAGNOSIS — Z01.818 PRE-OP EXAMINATION: Primary | ICD-10-CM

## 2021-12-02 PROCEDURE — 3008F BODY MASS INDEX DOCD: CPT | Performed by: INTERNAL MEDICINE

## 2021-12-02 PROCEDURE — 3078F DIAST BP <80 MM HG: CPT | Performed by: INTERNAL MEDICINE

## 2021-12-02 PROCEDURE — 99214 OFFICE O/P EST MOD 30 MIN: CPT | Performed by: INTERNAL MEDICINE

## 2021-12-02 PROCEDURE — 93000 ELECTROCARDIOGRAM COMPLETE: CPT | Performed by: INTERNAL MEDICINE

## 2021-12-02 PROCEDURE — 3075F SYST BP GE 130 - 139MM HG: CPT | Performed by: INTERNAL MEDICINE

## 2021-12-02 NOTE — PROGRESS NOTES
Carine Husain is a 74year old female.   Patient presents with:  Pre-Op Exam: ES rm - 4 pre-op 12/3 with Dr. Tri Hackett for Umbilical hernia without obstruction/ gangrene with mesh      HPI:     Patient with HTN, HL, hypothyroidism, asthma, hotflashes, h/o SA NIGHT 90 tablet 1   • DULOXETINE 60 MG Oral Cap DR Particles TAKE 1 CAPSULE(60 MG) BY MOUTH DAILY 30 capsule 2   • Budesonide-Formoterol Fumarate 160-4.5 MCG/ACT Inhalation Aerosol Inhale 2 puffs into the lungs 2 (two) times daily.      • Albuterol Sulfate Use: Never used    Alcohol use: Yes      Comment: monthly     Drug use: No    Family History   Problem Relation Age of Onset   • Heart Disease Brother    • High Blood Pressure Brother    • Cancer Brother 79        2 cancerous growths removed arm   • Other Alert and oriented, no focal deficits    EKG:NSR at 83 bpm, normal axis and intervals, normal EKG      ASSESSMENT AND PLAN:      Pre-op examination  (primary encounter diagnosis)- EKG is normal. Recent labs done in the ER are stable.  She is medically clear

## 2022-01-03 RX ORDER — DULOXETIN HYDROCHLORIDE 60 MG/1
CAPSULE, DELAYED RELEASE ORAL
Qty: 30 CAPSULE | Refills: 2 | Status: SHIPPED | OUTPATIENT
Start: 2022-01-03

## 2022-01-03 NOTE — TELEPHONE ENCOUNTER
Name from pharmacy: DULOXETINE DR 60MG CAPSULES          Will file in chart as: DULOXETINE 60 MG Oral Cap DR Particles    Sig: TAKE 1 CAPSULE(60 MG) BY MOUTH DAILY    Disp:  30 capsule    Refills:  2 (Pharmacy requested: Not specified)    Start: 1/2/2022

## 2022-01-07 ENCOUNTER — HOSPITAL ENCOUNTER (OUTPATIENT)
Dept: ULTRASOUND IMAGING | Age: 67
Discharge: HOME | End: 2022-01-07
Attending: INTERNAL MEDICINE
Payer: MEDICARE

## 2022-01-07 ENCOUNTER — LAB ENCOUNTER (OUTPATIENT)
Dept: LAB | Age: 67
End: 2022-01-07
Attending: INTERNAL MEDICINE
Payer: MEDICARE

## 2022-01-07 DIAGNOSIS — E78.5 DYSLIPIDEMIA: ICD-10-CM

## 2022-01-07 DIAGNOSIS — N83.201 RIGHT OVARIAN CYST: ICD-10-CM

## 2022-01-07 LAB
CHOLEST SERPL-MCNC: 241 MG/DL (ref ?–200)
FASTING PATIENT LIPID ANSWER: YES
HDLC SERPL-MCNC: 40 MG/DL (ref 40–59)
LDLC SERPL CALC-MCNC: 168 MG/DL (ref ?–100)
NONHDLC SERPL-MCNC: 201 MG/DL (ref ?–130)
TRIGL SERPL-MCNC: 180 MG/DL (ref 30–149)
VLDLC SERPL CALC-MCNC: 36 MG/DL (ref 0–30)

## 2022-01-07 PROCEDURE — 80061 LIPID PANEL: CPT

## 2022-01-07 PROCEDURE — 76856 US EXAM PELVIC COMPLETE: CPT | Performed by: INTERNAL MEDICINE

## 2022-01-07 PROCEDURE — 76830 TRANSVAGINAL US NON-OB: CPT | Performed by: INTERNAL MEDICINE

## 2022-01-12 DIAGNOSIS — E03.9 HYPOTHYROIDISM, UNSPECIFIED TYPE: ICD-10-CM

## 2022-01-14 RX ORDER — LEVOTHYROXINE SODIUM 0.07 MG/1
75 TABLET ORAL
Qty: 90 TABLET | Refills: 3 | Status: SHIPPED | OUTPATIENT
Start: 2022-01-14

## 2022-01-14 NOTE — TELEPHONE ENCOUNTER
Last VISIT - 12/2/21 Pre-op    Last CPE - 8/12/21    Last REFILL -    Levothyroxine Sodium 75 MCG Oral Tab 90 tablet 3 10/24/2018     Last LABS - 1/7/22 lipid, cbc, cmp    Future Appointments   Date Time Provider Radha Willoughby   1/18/2022 10:00 AM [place]

## 2022-01-18 ENCOUNTER — VIRTUAL PHONE E/M (OUTPATIENT)
Dept: INTERNAL MEDICINE CLINIC | Facility: CLINIC | Age: 67
End: 2022-01-18
Payer: MEDICARE

## 2022-01-18 DIAGNOSIS — E78.5 DYSLIPIDEMIA: Primary | ICD-10-CM

## 2022-01-18 DIAGNOSIS — H91.93 HEARING LOSS ASSOCIATED WITH SYNDROME OF BOTH EARS: ICD-10-CM

## 2022-01-18 DIAGNOSIS — Z12.83 SCREENING FOR SKIN CANCER: ICD-10-CM

## 2022-01-18 DIAGNOSIS — N83.201 RIGHT OVARIAN CYST: ICD-10-CM

## 2022-01-18 DIAGNOSIS — D22.9 CHANGE IN MULTIPLE NEVI: ICD-10-CM

## 2022-01-18 PROCEDURE — 99441 PHONE E/M BY PHYS 5-10 MIN: CPT | Performed by: INTERNAL MEDICINE

## 2022-01-18 RX ORDER — ROSUVASTATIN CALCIUM 20 MG/1
20 TABLET, COATED ORAL NIGHTLY
Qty: 90 TABLET | Refills: 1 | Status: SHIPPED | OUTPATIENT
Start: 2022-01-18

## 2022-01-18 NOTE — PROGRESS NOTES
Due to COVID-19 ACTION PLAN, the patient's office visit was converted to a phone visit with informed patient consent. Time Spent: 10 min    Subjective     HPI:   Tiny Holcomb is a 69year old female who presents for f/u.   Dyslipidemia- she is taking communication. This has been done in good roderick to provide continuity of care in the best interest of the provider-patient relationship, due to the on-going public health crisis/national emergency and because of restrictions of visitation.   There twila Harris

## 2022-02-10 ENCOUNTER — TELEPHONE (OUTPATIENT)
Dept: INTERNAL MEDICINE CLINIC | Facility: CLINIC | Age: 67
End: 2022-02-10

## 2022-02-10 NOTE — TELEPHONE ENCOUNTER
Results received from Dandre rodas r/t bx. Bowen's disease, neurofibroma, inflamed seborrheic keratosis    Placed in TB bin for review.

## 2022-02-10 NOTE — TELEPHONE ENCOUNTER
Received biopsy results from [facility] dermatology. Put in nurses bin for review.

## 2022-02-28 ENCOUNTER — TELEPHONE (OUTPATIENT)
Dept: INTERNAL MEDICINE CLINIC | Facility: CLINIC | Age: 67
End: 2022-02-28

## 2022-02-28 NOTE — TELEPHONE ENCOUNTER
Received consultation from biopsy at [facility]. Put in nurses bin for review.

## 2022-03-07 ENCOUNTER — TELEPHONE (OUTPATIENT)
Dept: OBGYN CLINIC | Facility: CLINIC | Age: 67
End: 2022-03-07

## 2022-03-07 ENCOUNTER — OFFICE VISIT (OUTPATIENT)
Dept: OBGYN CLINIC | Facility: CLINIC | Age: 67
End: 2022-03-07
Payer: MEDICARE

## 2022-03-07 VITALS
WEIGHT: 191.38 LBS | HEIGHT: 66.5 IN | BODY MASS INDEX: 30.39 KG/M2 | SYSTOLIC BLOOD PRESSURE: 108 MMHG | DIASTOLIC BLOOD PRESSURE: 68 MMHG

## 2022-03-07 DIAGNOSIS — Z12.39 SCREENING BREAST EXAMINATION: ICD-10-CM

## 2022-03-07 DIAGNOSIS — R93.89 THICKENED ENDOMETRIUM: ICD-10-CM

## 2022-03-07 DIAGNOSIS — L29.2 VULVAR ITCHING: ICD-10-CM

## 2022-03-07 DIAGNOSIS — Z12.4 SCREENING FOR CERVICAL CANCER: ICD-10-CM

## 2022-03-07 DIAGNOSIS — N76.3 CHRONIC VULVITIS: ICD-10-CM

## 2022-03-07 DIAGNOSIS — Z01.411 ENCOUNTER FOR WELL WOMAN EXAM WITH ABNORMAL FINDINGS: ICD-10-CM

## 2022-03-07 DIAGNOSIS — Z80.3 FAMILY HISTORY OF BREAST CANCER IN MOTHER: ICD-10-CM

## 2022-03-07 DIAGNOSIS — N83.201 OVARIAN CYST, RIGHT: Primary | ICD-10-CM

## 2022-03-07 PROCEDURE — 88175 CYTOPATH C/V AUTO FLUID REDO: CPT | Performed by: OBSTETRICS & GYNECOLOGY

## 2022-03-07 PROCEDURE — 58100 BIOPSY OF UTERUS LINING: CPT | Performed by: OBSTETRICS & GYNECOLOGY

## 2022-03-07 PROCEDURE — G0101 CA SCREEN;PELVIC/BREAST EXAM: HCPCS | Performed by: OBSTETRICS & GYNECOLOGY

## 2022-03-07 PROCEDURE — 3074F SYST BP LT 130 MM HG: CPT | Performed by: OBSTETRICS & GYNECOLOGY

## 2022-03-07 PROCEDURE — 3008F BODY MASS INDEX DOCD: CPT | Performed by: OBSTETRICS & GYNECOLOGY

## 2022-03-07 PROCEDURE — 88305 TISSUE EXAM BY PATHOLOGIST: CPT | Performed by: OBSTETRICS & GYNECOLOGY

## 2022-03-07 PROCEDURE — 99204 OFFICE O/P NEW MOD 45 MIN: CPT | Performed by: OBSTETRICS & GYNECOLOGY

## 2022-03-07 PROCEDURE — 3078F DIAST BP <80 MM HG: CPT | Performed by: OBSTETRICS & GYNECOLOGY

## 2022-03-07 NOTE — TELEPHONE ENCOUNTER
Name/ verified by pt  Tubes labeled legible   Labs hereditary cancer screening lab drawn, pt tolerated well. Tubes placed in lab office. INVITAE access, cost, result call office in 4 weeks discussed. Pt. Mg Mendoza.

## 2022-03-07 NOTE — PROCEDURES
3/7/2022    Procedure: Endometrial biopsy    Pre-op Dx: thickened endometrium (borderline) with postmenopausal complex ovarian cyst     Post-op Dx: Same    Indication: 69year old [id number] female with above. Risks, benefits, alternatives discussed. Speculum placed. Cervix swabbed with betadine x 3   Tenaculum applied to anterior lip of cervix  Cervical canal not stenotic  Pipelle inserted without difficulty to 6.5 cm  Multiple passes made with small tissue obtained. Pipelle & tenaculum removed  Hemostatic tenaculum sites  Speculum removed.    Tolerated well  EBL minimal  Specimen: endometrial biopsy    Nato Tobias MD

## 2022-03-07 NOTE — PATIENT INSTRUCTIONS
Hereditary cancer mutation testing today. Bloodwork at lab: CA-125    Call/MyChart us with your steroid medication information for the vulva. Contact GI to see when due for next colonoscopy     [facility] - breast navigator, Dionne Espinoza, at [phone]. Can call her to discuss getting set up in this clinic. We'll plan to do surgery pending bloodwork & endometrial biopsy results.

## 2022-03-09 ENCOUNTER — TELEPHONE (OUTPATIENT)
Dept: INTERNAL MEDICINE CLINIC | Facility: CLINIC | Age: 67
End: 2022-03-09

## 2022-03-09 NOTE — TELEPHONE ENCOUNTER
LOV 1/18/2022    Called Dr. Nicole Elam office to confirm DX code, correct code C44.42.  Pended referral.

## 2022-03-09 NOTE — TELEPHONE ENCOUNTER
Pt was sent to Dr Inocencio Yancey for skin issues and is now needing Mohs surgery and needs another referral placed.      Dx: R15.8U    Procedure: [id number]                    [id number]

## 2022-03-11 RX ORDER — CLOBETASOL PROPIONATE 0.5 MG/G
CREAM TOPICAL 2 TIMES DAILY
Refills: 0 | Status: CANCELLED | OUTPATIENT
Start: 2022-03-11

## 2022-03-11 RX ORDER — MOMETASONE FUROATE 1 MG/G
OINTMENT TOPICAL
Qty: 45 G | Refills: 3 | Status: SHIPPED | OUTPATIENT
Start: 2022-03-11

## 2022-03-11 NOTE — TELEPHONE ENCOUNTER
Patient aware prescription routed to Dr Merary Ortega for signature the her pharmacy on file. Understanding verbalized.

## 2022-03-11 NOTE — PROGRESS NOTES
Patient aware of results and that the endometrial biopsy insufficient tissue to make a pathology diagnosis. This is ok. Am certain was in endometrial cavity and biopsy was only done due to borderline endometrium 4-5 mm (no vaginal bleeding.) Patient verbalized understanding.

## 2022-03-16 ENCOUNTER — TELEPHONE (OUTPATIENT)
Dept: OBGYN CLINIC | Facility: CLINIC | Age: 67
End: 2022-03-16

## 2022-03-29 NOTE — PROGRESS NOTES
Called and spoke to patient. Informed of normal results. Pt verbalized and expressed an understanding.

## 2022-03-30 ENCOUNTER — LAB ENCOUNTER (OUTPATIENT)
Dept: LAB | Age: 67
End: 2022-03-30
Attending: INTERNAL MEDICINE
Payer: MEDICARE

## 2022-03-30 DIAGNOSIS — E78.5 DYSLIPIDEMIA: ICD-10-CM

## 2022-03-30 DIAGNOSIS — N83.201 OVARIAN CYST, RIGHT: ICD-10-CM

## 2022-03-30 LAB
ALBUMIN SERPL-MCNC: 3.9 G/DL (ref 3.4–5)
ALP LIVER SERPL-CCNC: 83 U/L
ALT SERPL-CCNC: 33 U/L
AST SERPL-CCNC: 22 U/L (ref 15–37)
BILIRUB DIRECT SERPL-MCNC: 0.2 MG/DL (ref 0–0.2)
BILIRUB SERPL-MCNC: 0.9 MG/DL (ref 0.1–2)
CANCER AG125 SERPL-ACNC: 8 U/ML (ref ?–35)
CHOLEST SERPL-MCNC: 146 MG/DL (ref ?–200)
FASTING PATIENT LIPID ANSWER: YES
HDLC SERPL-MCNC: 41 MG/DL (ref 40–59)
LDLC SERPL CALC-MCNC: 79 MG/DL (ref ?–100)
NONHDLC SERPL-MCNC: 105 MG/DL (ref ?–130)
PROT SERPL-MCNC: 6.9 G/DL (ref 6.4–8.2)
TRIGL SERPL-MCNC: 150 MG/DL (ref 30–149)
VLDLC SERPL CALC-MCNC: 23 MG/DL (ref 0–30)

## 2022-03-30 PROCEDURE — 80076 HEPATIC FUNCTION PANEL: CPT

## 2022-03-30 PROCEDURE — 36415 COLL VENOUS BLD VENIPUNCTURE: CPT

## 2022-03-30 PROCEDURE — 80061 LIPID PANEL: CPT

## 2022-03-30 PROCEDURE — 86304 IMMUNOASSAY TUMOR CA 125: CPT

## 2022-03-30 NOTE — PROGRESS NOTES
Aware of result/recommendation. Would like to proceed with laparoscopic pelvic washings, bilateral salpingo-oophorectomy. She's available anytime in April and May. Will contact pt ones schedule. Pt verb. Understanding.

## 2022-03-31 ENCOUNTER — TELEPHONE (OUTPATIENT)
Dept: OBGYN CLINIC | Facility: CLINIC | Age: 67
End: 2022-03-31

## 2022-03-31 NOTE — TELEPHONE ENCOUNTER
Patient aware of scheduled surgery on 5/6/22 at 0730 am. Instructions given, Covid ordered and understanding verbalized.

## 2022-04-01 ENCOUNTER — TELEPHONE (OUTPATIENT)
Dept: INTERNAL MEDICINE CLINIC | Facility: CLINIC | Age: 67
End: 2022-04-01

## 2022-04-01 ENCOUNTER — HOSPITAL ENCOUNTER (OUTPATIENT)
Dept: CT IMAGING | Facility: HOSPITAL | Age: 67
Discharge: HOME | End: 2022-04-01
Attending: INTERNAL MEDICINE
Payer: MEDICARE

## 2022-04-01 DIAGNOSIS — R91.8 PULMONARY NODULES: ICD-10-CM

## 2022-04-01 PROCEDURE — 71250 CT THORAX DX C-: CPT | Performed by: INTERNAL MEDICINE

## 2022-04-04 NOTE — TELEPHONE ENCOUNTER
Per [employer] from [facility] Pt outpatient procedure code 03414 has been approved for 5/6/22 (and is good for 30 days).  Approval # [id number]    Any questions [employer] is available at [phone]

## 2022-04-14 ENCOUNTER — TELEPHONE (OUTPATIENT)
Dept: OBGYN CLINIC | Facility: CLINIC | Age: 67
End: 2022-04-14

## 2022-04-14 NOTE — TELEPHONE ENCOUNTER
Pt is asking the MD if she could have total hysterectomy on May 6, not just oophorectomy.  She discussed that w/MD previously

## 2022-04-18 ENCOUNTER — TELEPHONE (OUTPATIENT)
Dept: OBGYN CLINIC | Facility: CLINIC | Age: 67
End: 2022-04-18

## 2022-04-18 NOTE — TELEPHONE ENCOUNTER
Surgery on 5/6, getting ovaries taken out, also wants to get uterus taken out at same time, please advise.

## 2022-04-19 NOTE — TELEPHONE ENCOUNTER
Pt desires to have hysterectomy as she is getting older. Prefer to reschedule for robotic hyst.   She's available any time in May or June. Will call pt once approved/scheduled. Pt verb understanding.

## 2022-04-19 NOTE — TELEPHONE ENCOUNTER
Ok to change the procedure. Would schedule:  Robot assisted total laparoscopic hysterectomy, bilateral salpingo-oophorectomy, pelvic washings, cystoscopy, possible laparotomy    Surgical assistant would be great. Follow up visits at 2 wk & 6 wk postop.      Thanks

## 2022-04-19 NOTE — TELEPHONE ENCOUNTER
Spoke with patient. She is aware of her change to her scheduled surgery and all instructions. Understanding verbalized.

## 2022-05-10 RX ORDER — PRAVASTATIN SODIUM 80 MG/1
TABLET ORAL
Qty: 90 TABLET | Refills: 1 | Status: SHIPPED | OUTPATIENT
Start: 2022-05-10

## 2022-05-10 RX ORDER — DULOXETIN HYDROCHLORIDE 60 MG/1
CAPSULE, DELAYED RELEASE ORAL
Qty: 30 CAPSULE | Refills: 2 | Status: SHIPPED | OUTPATIENT
Start: 2022-05-10

## 2022-05-10 RX ORDER — METOPROLOL TARTRATE 50 MG/1
TABLET, FILM COATED ORAL
Qty: 45 TABLET | Refills: 1 | Status: SHIPPED | OUTPATIENT
Start: 2022-05-10

## 2022-05-10 NOTE — TELEPHONE ENCOUNTER
Last VISIT 12/02/21    Last CPE 08/12/21    Last REFILL 01/03/22 qty 30 w/2 refills    Last LABS 03/30/22 Lipid done    No Future Appointments      Per PROTOCOL? Not on protocol      Please Approve or Deny.

## 2022-05-11 ENCOUNTER — OFFICE VISIT (OUTPATIENT)
Dept: FAMILY MEDICINE CLINIC | Facility: CLINIC | Age: 67
End: 2022-05-11
Payer: MEDICARE

## 2022-05-11 VITALS
BODY MASS INDEX: 29.86 KG/M2 | OXYGEN SATURATION: 99 % | RESPIRATION RATE: 16 BRPM | SYSTOLIC BLOOD PRESSURE: 158 MMHG | WEIGHT: 188 LBS | TEMPERATURE: 98 F | HEIGHT: 66.5 IN | DIASTOLIC BLOOD PRESSURE: 89 MMHG | HEART RATE: 85 BPM

## 2022-05-11 DIAGNOSIS — Z87.09 HISTORY OF ASTHMA: ICD-10-CM

## 2022-05-11 DIAGNOSIS — J06.9 UPPER RESPIRATORY TRACT INFECTION, UNSPECIFIED TYPE: Primary | ICD-10-CM

## 2022-05-11 LAB
OPERATOR ID: NORMAL
POCT LOT NUMBER: NORMAL
RAPID SARS-COV-2 BY PCR: NOT DETECTED

## 2022-05-11 PROCEDURE — 3079F DIAST BP 80-89 MM HG: CPT | Performed by: NURSE PRACTITIONER

## 2022-05-11 PROCEDURE — 3077F SYST BP >= 140 MM HG: CPT | Performed by: NURSE PRACTITIONER

## 2022-05-11 PROCEDURE — U0002 COVID-19 LAB TEST NON-CDC: HCPCS | Performed by: NURSE PRACTITIONER

## 2022-05-11 PROCEDURE — 3008F BODY MASS INDEX DOCD: CPT | Performed by: NURSE PRACTITIONER

## 2022-05-11 PROCEDURE — 99213 OFFICE O/P EST LOW 20 MIN: CPT | Performed by: NURSE PRACTITIONER

## 2022-05-11 RX ORDER — PREDNISONE 20 MG/1
40 TABLET ORAL DAILY
Qty: 10 TABLET | Refills: 0 | Status: SHIPPED | OUTPATIENT
Start: 2022-05-11 | End: 2022-05-16

## 2022-06-21 ENCOUNTER — OFFICE VISIT (OUTPATIENT)
Dept: INTERNAL MEDICINE CLINIC | Facility: CLINIC | Age: 67
End: 2022-06-21
Payer: MEDICARE

## 2022-06-21 ENCOUNTER — LAB ENCOUNTER (OUTPATIENT)
Dept: LAB | Facility: HOSPITAL | Age: 67
End: 2022-06-21
Attending: OBSTETRICS & GYNECOLOGY
Payer: MEDICARE

## 2022-06-21 VITALS
HEIGHT: 66.5 IN | RESPIRATION RATE: 18 BRPM | SYSTOLIC BLOOD PRESSURE: 118 MMHG | DIASTOLIC BLOOD PRESSURE: 86 MMHG | HEART RATE: 60 BPM | BODY MASS INDEX: 30.84 KG/M2 | WEIGHT: 194.19 LBS

## 2022-06-21 DIAGNOSIS — R23.2 HOT FLASHES: ICD-10-CM

## 2022-06-21 DIAGNOSIS — Z86.79 HISTORY OF SUBARACHNOID HEMORRHAGE: ICD-10-CM

## 2022-06-21 DIAGNOSIS — R47.89 OTHER SPEECH DISTURBANCE: ICD-10-CM

## 2022-06-21 DIAGNOSIS — Z78.0 POST-MENOPAUSAL: ICD-10-CM

## 2022-06-21 DIAGNOSIS — Z20.822 ENCOUNTER FOR PREPROCEDURE SCREENING LABORATORY TESTING FOR COVID-19: ICD-10-CM

## 2022-06-21 DIAGNOSIS — E03.8 OTHER SPECIFIED HYPOTHYROIDISM: ICD-10-CM

## 2022-06-21 DIAGNOSIS — N83.201 OVARIAN CYST, RIGHT: ICD-10-CM

## 2022-06-21 DIAGNOSIS — I70.0 ATHEROSCLEROSIS OF AORTA (HCC): ICD-10-CM

## 2022-06-21 DIAGNOSIS — I10 PRIMARY HYPERTENSION: ICD-10-CM

## 2022-06-21 DIAGNOSIS — Z86.010 PERSONAL HISTORY OF COLONIC POLYPS: ICD-10-CM

## 2022-06-21 DIAGNOSIS — E78.5 DYSLIPIDEMIA: ICD-10-CM

## 2022-06-21 DIAGNOSIS — Z01.812 ENCOUNTER FOR PREPROCEDURE SCREENING LABORATORY TESTING FOR COVID-19: ICD-10-CM

## 2022-06-21 DIAGNOSIS — R93.89 THICKENED ENDOMETRIUM: ICD-10-CM

## 2022-06-21 DIAGNOSIS — Z00.00 ENCOUNTER FOR ANNUAL HEALTH EXAMINATION: Primary | ICD-10-CM

## 2022-06-21 DIAGNOSIS — Z12.31 ENCOUNTER FOR SCREENING MAMMOGRAM FOR MALIGNANT NEOPLASM OF BREAST: ICD-10-CM

## 2022-06-21 DIAGNOSIS — J82.83 EOSINOPHILIC ASTHMA: ICD-10-CM

## 2022-06-21 PROBLEM — F32.0 MAJOR DEPRESSIVE DISORDER, SINGLE EPISODE, MILD: Status: ACTIVE | Noted: 2022-06-21
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
## 2022-06-21 PROBLEM — IMO0002 DISORDER OF ROTATOR CUFF SYNDROME OF RIGHT SHOULDER AND ALLIED DISORDER: Status: RESOLVED | Noted: 2021-08-04 | Resolved: 2022-06-21

## 2022-06-21 PROBLEM — M25.511 ACUTE POSTOPERATIVE PAIN OF RIGHT SHOULDER: Status: RESOLVED | Noted: 2020-10-22 | Resolved: 2022-06-21

## 2022-06-21 PROBLEM — R94.4 DECREASED GFR: Status: RESOLVED | Noted: 2019-09-22 | Resolved: 2022-06-21

## 2022-06-21 PROBLEM — F32.0 MAJOR DEPRESSIVE DISORDER, SINGLE EPISODE, MILD: Status: RESOLVED | Noted: 2022-06-21 | Resolved: 2022-06-21

## 2022-06-21 PROBLEM — F32.0 MAJOR DEPRESSIVE DISORDER, SINGLE EPISODE, MILD (HCC): Status: ACTIVE | Noted: 2022-06-21

## 2022-06-21 PROBLEM — Z12.11 SCREENING FOR MALIGNANT NEOPLASM OF COLON: Status: RESOLVED | Noted: 2018-11-02 | Resolved: 2022-06-21

## 2022-06-21 PROBLEM — J41.0 SMOKERS' COUGH (HCC): Chronic | Status: RESOLVED | Noted: 2022-06-21 | Resolved: 2022-06-21

## 2022-06-21 PROBLEM — M54.50 ACUTE MIDLINE LOW BACK PAIN WITHOUT SCIATICA: Status: RESOLVED | Noted: 2021-06-14 | Resolved: 2022-06-21

## 2022-06-21 PROBLEM — M25.511 CHRONIC RIGHT SHOULDER PAIN: Status: RESOLVED | Noted: 2021-08-11 | Resolved: 2022-06-21

## 2022-06-21 PROBLEM — L29.2 VULVAR ITCHING: Status: RESOLVED | Noted: 2022-03-07 | Resolved: 2022-06-21

## 2022-06-21 PROBLEM — S09.90XA INJURY OF HEAD, INITIAL ENCOUNTER: Status: RESOLVED | Noted: 2021-06-09 | Resolved: 2022-06-21

## 2022-06-21 PROBLEM — F32.0 MAJOR DEPRESSIVE DISORDER, SINGLE EPISODE, MILD (HCC): Status: RESOLVED | Noted: 2022-06-21 | Resolved: 2022-06-21

## 2022-06-21 PROBLEM — G89.18 ACUTE POSTOPERATIVE PAIN OF RIGHT SHOULDER: Status: RESOLVED | Noted: 2020-10-22 | Resolved: 2022-06-21

## 2022-06-21 PROBLEM — G89.29 CHRONIC RIGHT SHOULDER PAIN: Status: RESOLVED | Noted: 2021-08-11 | Resolved: 2022-06-21

## 2022-06-21 PROBLEM — J41.0 SMOKERS' COUGH (HCC): Chronic | Status: ACTIVE | Noted: 2022-06-21

## 2022-06-21 PROBLEM — N76.3 CHRONIC VULVITIS: Status: RESOLVED | Noted: 2022-03-07 | Resolved: 2022-06-21

## 2022-06-21 PROBLEM — M75.121 NONTRAUMATIC COMPLETE TEAR OF RIGHT ROTATOR CUFF: Status: RESOLVED | Noted: 2020-08-11 | Resolved: 2022-06-21

## 2022-06-21 PROBLEM — M66.321 NONTRAUMATIC RUPTURE OF RIGHT LONG HEAD BICEPS TENDON: Status: RESOLVED | Noted: 2020-08-11 | Resolved: 2022-06-21

## 2022-06-21 LAB — SARS-COV-2 RNA RESP QL NAA+PROBE: NOT DETECTED

## 2022-06-21 PROCEDURE — G0439 PPPS, SUBSEQ VISIT: HCPCS | Performed by: INTERNAL MEDICINE

## 2022-06-21 PROCEDURE — 96160 PT-FOCUSED HLTH RISK ASSMT: CPT | Performed by: INTERNAL MEDICINE

## 2022-06-21 PROCEDURE — 1125F AMNT PAIN NOTED PAIN PRSNT: CPT | Performed by: INTERNAL MEDICINE

## 2022-06-21 PROCEDURE — 3074F SYST BP LT 130 MM HG: CPT | Performed by: INTERNAL MEDICINE

## 2022-06-21 PROCEDURE — 3008F BODY MASS INDEX DOCD: CPT | Performed by: INTERNAL MEDICINE

## 2022-06-21 PROCEDURE — 99397 PER PM REEVAL EST PAT 65+ YR: CPT | Performed by: INTERNAL MEDICINE

## 2022-06-21 PROCEDURE — 3079F DIAST BP 80-89 MM HG: CPT | Performed by: INTERNAL MEDICINE

## 2022-06-22 ENCOUNTER — EKG ENCOUNTER (OUTPATIENT)
Dept: LAB | Facility: HOSPITAL | Age: 67
End: 2022-06-22
Attending: OBSTETRICS & GYNECOLOGY
Payer: MEDICARE

## 2022-06-22 DIAGNOSIS — Z01.818 PRE-OP TESTING: ICD-10-CM

## 2022-06-22 DIAGNOSIS — E03.8 OTHER SPECIFIED HYPOTHYROIDISM: ICD-10-CM

## 2022-06-22 PROBLEM — I60.9 SAH (SUBARACHNOID HEMORRHAGE) (HCC): Status: RESOLVED | Noted: 2021-06-09 | Resolved: 2022-06-22

## 2022-06-22 PROBLEM — Z86.79 HISTORY OF SUBARACHNOID HEMORRHAGE: Status: ACTIVE | Noted: 2022-06-22

## 2022-06-22 PROBLEM — R23.2 HOT FLASHES: Status: ACTIVE | Noted: 2022-06-22

## 2022-06-22 LAB
ANION GAP SERPL CALC-SCNC: 5 MMOL/L (ref 0–18)
BUN BLD-MCNC: 24 MG/DL (ref 7–18)
CALCIUM BLD-MCNC: 9.4 MG/DL (ref 8.5–10.1)
CHLORIDE SERPL-SCNC: 110 MMOL/L (ref 98–112)
CO2 SERPL-SCNC: 26 MMOL/L (ref 21–32)
CREAT BLD-MCNC: 0.8 MG/DL
ERYTHROCYTE [DISTWIDTH] IN BLOOD BY AUTOMATED COUNT: 12.6 %
FASTING STATUS PATIENT QL REPORTED: YES
GLUCOSE BLD-MCNC: 102 MG/DL (ref 70–99)
HCT VFR BLD AUTO: 42.7 %
HGB BLD-MCNC: 13.6 G/DL
MCH RBC QN AUTO: 28.6 PG (ref 26–34)
MCHC RBC AUTO-ENTMCNC: 31.9 G/DL (ref 31–37)
MCV RBC AUTO: 89.9 FL
OSMOLALITY SERPL CALC.SUM OF ELEC: 296 MOSM/KG (ref 275–295)
PLATELET # BLD AUTO: 207 10(3)UL (ref 150–450)
POTASSIUM SERPL-SCNC: 4.4 MMOL/L (ref 3.5–5.1)
RBC # BLD AUTO: 4.75 X10(6)UL
SODIUM SERPL-SCNC: 141 MMOL/L (ref 136–145)
TSI SER-ACNC: 2.19 MIU/ML (ref 0.36–3.74)
WBC # BLD AUTO: 4.8 X10(3) UL (ref 4–11)

## 2022-06-22 PROCEDURE — 85027 COMPLETE CBC AUTOMATED: CPT

## 2022-06-22 PROCEDURE — 36415 COLL VENOUS BLD VENIPUNCTURE: CPT

## 2022-06-22 PROCEDURE — 93005 ELECTROCARDIOGRAM TRACING: CPT

## 2022-06-22 PROCEDURE — 80048 BASIC METABOLIC PNL TOTAL CA: CPT

## 2022-06-22 PROCEDURE — 93010 ELECTROCARDIOGRAM REPORT: CPT | Performed by: INTERNAL MEDICINE

## 2022-06-22 PROCEDURE — 84443 ASSAY THYROID STIM HORMONE: CPT

## 2022-06-23 ENCOUNTER — ANESTHESIA EVENT (OUTPATIENT)
Dept: SURGERY | Facility: HOSPITAL | Age: 67
End: 2022-06-23
Payer: MEDICARE

## 2022-06-23 LAB
ATRIAL RATE: 54 BPM
P AXIS: -3 DEGREES
P-R INTERVAL: 176 MS
Q-T INTERVAL: 424 MS
QRS DURATION: 74 MS
QTC CALCULATION (BEZET): 402 MS
R AXIS: 21 DEGREES
T AXIS: 35 DEGREES
VENTRICULAR RATE: 54 BPM

## 2022-06-24 ENCOUNTER — HOSPITAL ENCOUNTER (OUTPATIENT)
Facility: HOSPITAL | Age: 67
Discharge: HOME | End: 2022-06-25
Attending: OBSTETRICS & GYNECOLOGY | Admitting: OBSTETRICS & GYNECOLOGY
Payer: MEDICARE

## 2022-06-24 ENCOUNTER — ANESTHESIA (OUTPATIENT)
Dept: SURGERY | Facility: HOSPITAL | Age: 67
End: 2022-06-24
Payer: MEDICARE

## 2022-06-24 DIAGNOSIS — N83.201 OVARIAN CYST, RIGHT: ICD-10-CM

## 2022-06-24 DIAGNOSIS — Z01.818 PRE-OP TESTING: Primary | ICD-10-CM

## 2022-06-24 DIAGNOSIS — R93.89 THICKENED ENDOMETRIUM: ICD-10-CM

## 2022-06-24 PROBLEM — Z90.722 S/P BILATERAL SALPINGO-OOPHORECTOMY: Status: ACTIVE | Noted: 2022-06-24

## 2022-06-24 PROBLEM — Z90.710 STATUS POST LAPAROSCOPIC HYSTERECTOMY: Status: ACTIVE | Noted: 2022-06-24

## 2022-06-24 PROCEDURE — 0UT74ZZ RESECTION OF BILATERAL FALLOPIAN TUBES, PERCUTANEOUS ENDOSCOPIC APPROACH: ICD-10-PCS | Performed by: OBSTETRICS & GYNECOLOGY

## 2022-06-24 PROCEDURE — 8E0W4CZ ROBOTIC ASSISTED PROCEDURE OF TRUNK REGION, PERCUTANEOUS ENDOSCOPIC APPROACH: ICD-10-PCS | Performed by: OBSTETRICS & GYNECOLOGY

## 2022-06-24 PROCEDURE — 88108 CYTOPATH CONCENTRATE TECH: CPT | Performed by: OBSTETRICS & GYNECOLOGY

## 2022-06-24 PROCEDURE — 88305 TISSUE EXAM BY PATHOLOGIST: CPT | Performed by: OBSTETRICS & GYNECOLOGY

## 2022-06-24 PROCEDURE — 88307 TISSUE EXAM BY PATHOLOGIST: CPT | Performed by: OBSTETRICS & GYNECOLOGY

## 2022-06-24 PROCEDURE — 0UT24ZZ RESECTION OF BILATERAL OVARIES, PERCUTANEOUS ENDOSCOPIC APPROACH: ICD-10-PCS | Performed by: OBSTETRICS & GYNECOLOGY

## 2022-06-24 PROCEDURE — 0UT94ZZ RESECTION OF UTERUS, PERCUTANEOUS ENDOSCOPIC APPROACH: ICD-10-PCS | Performed by: OBSTETRICS & GYNECOLOGY

## 2022-06-24 RX ORDER — METOCLOPRAMIDE HYDROCHLORIDE 5 MG/ML
10 INJECTION INTRAMUSCULAR; INTRAVENOUS EVERY 8 HOURS PRN
Status: DISCONTINUED | OUTPATIENT
Start: 2022-06-24 | End: 2022-06-24 | Stop reason: HOSPADM

## 2022-06-24 RX ORDER — HYDROMORPHONE HYDROCHLORIDE 1 MG/ML
0.4 INJECTION, SOLUTION INTRAMUSCULAR; INTRAVENOUS; SUBCUTANEOUS EVERY 2 HOUR PRN
Status: DISCONTINUED | OUTPATIENT
Start: 2022-06-24 | End: 2022-06-25

## 2022-06-24 RX ORDER — DIPHENHYDRAMINE HYDROCHLORIDE 50 MG/ML
12.5 INJECTION INTRAMUSCULAR; INTRAVENOUS AS NEEDED
Status: DISCONTINUED | OUTPATIENT
Start: 2022-06-24 | End: 2022-06-24 | Stop reason: HOSPADM

## 2022-06-24 RX ORDER — HYDROMORPHONE HYDROCHLORIDE 1 MG/ML
0.2 INJECTION, SOLUTION INTRAMUSCULAR; INTRAVENOUS; SUBCUTANEOUS EVERY 5 MIN PRN
Status: DISCONTINUED | OUTPATIENT
Start: 2022-06-24 | End: 2022-06-24 | Stop reason: HOSPADM

## 2022-06-24 RX ORDER — METOPROLOL TARTRATE 50 MG/1
25 TABLET, FILM COATED ORAL DAILY
COMMUNITY

## 2022-06-24 RX ORDER — NALOXONE HYDROCHLORIDE 0.4 MG/ML
80 INJECTION, SOLUTION INTRAMUSCULAR; INTRAVENOUS; SUBCUTANEOUS AS NEEDED
Status: DISCONTINUED | OUTPATIENT
Start: 2022-06-24 | End: 2022-06-24 | Stop reason: HOSPADM

## 2022-06-24 RX ORDER — ACETAMINOPHEN 500 MG
1000 TABLET ORAL EVERY 8 HOURS SCHEDULED
Status: DISCONTINUED | OUTPATIENT
Start: 2022-06-24 | End: 2022-06-25

## 2022-06-24 RX ORDER — SODIUM CHLORIDE, SODIUM LACTATE, POTASSIUM CHLORIDE, CALCIUM CHLORIDE 600; 310; 30; 20 MG/100ML; MG/100ML; MG/100ML; MG/100ML
INJECTION, SOLUTION INTRAVENOUS CONTINUOUS
Status: DISCONTINUED | OUTPATIENT
Start: 2022-06-24 | End: 2022-06-25

## 2022-06-24 RX ORDER — DULOXETIN HYDROCHLORIDE 30 MG/1
60 CAPSULE, DELAYED RELEASE ORAL DAILY
Status: DISCONTINUED | OUTPATIENT
Start: 2022-06-24 | End: 2022-06-25

## 2022-06-24 RX ORDER — ALBUTEROL SULFATE 90 UG/1
2 AEROSOL, METERED RESPIRATORY (INHALATION) EVERY 4 HOURS PRN
Status: DISCONTINUED | OUTPATIENT
Start: 2022-06-24 | End: 2022-06-25

## 2022-06-24 RX ORDER — ONDANSETRON 4 MG/1
4 TABLET, FILM COATED ORAL EVERY 8 HOURS PRN
Status: DISCONTINUED | OUTPATIENT
Start: 2022-06-24 | End: 2022-06-25

## 2022-06-24 RX ORDER — ONDANSETRON 2 MG/ML
INJECTION INTRAMUSCULAR; INTRAVENOUS AS NEEDED
Status: DISCONTINUED | OUTPATIENT
Start: 2022-06-24 | End: 2022-06-24 | Stop reason: SURG

## 2022-06-24 RX ORDER — KETOROLAC TROMETHAMINE 30 MG/ML
INJECTION, SOLUTION INTRAMUSCULAR; INTRAVENOUS AS NEEDED
Status: DISCONTINUED | OUTPATIENT
Start: 2022-06-24 | End: 2022-06-24 | Stop reason: SURG

## 2022-06-24 RX ORDER — HYDROMORPHONE HYDROCHLORIDE 1 MG/ML
0.6 INJECTION, SOLUTION INTRAMUSCULAR; INTRAVENOUS; SUBCUTANEOUS EVERY 5 MIN PRN
Status: DISCONTINUED | OUTPATIENT
Start: 2022-06-24 | End: 2022-06-24 | Stop reason: HOSPADM

## 2022-06-24 RX ORDER — ONDANSETRON 2 MG/ML
4 INJECTION INTRAMUSCULAR; INTRAVENOUS EVERY 8 HOURS PRN
Status: DISCONTINUED | OUTPATIENT
Start: 2022-06-24 | End: 2022-06-25

## 2022-06-24 RX ORDER — HYDROCODONE BITARTRATE AND ACETAMINOPHEN 5; 325 MG/1; MG/1
1 TABLET ORAL ONCE AS NEEDED
Status: DISCONTINUED | OUTPATIENT
Start: 2022-06-24 | End: 2022-06-24 | Stop reason: HOSPADM

## 2022-06-24 RX ORDER — SENNOSIDES 8.6 MG
17.2 TABLET ORAL NIGHTLY PRN
Status: DISCONTINUED | OUTPATIENT
Start: 2022-06-24 | End: 2022-06-25

## 2022-06-24 RX ORDER — SODIUM CHLORIDE, SODIUM LACTATE, POTASSIUM CHLORIDE, CALCIUM CHLORIDE 600; 310; 30; 20 MG/100ML; MG/100ML; MG/100ML; MG/100ML
INJECTION, SOLUTION INTRAVENOUS CONTINUOUS
Status: DISCONTINUED | OUTPATIENT
Start: 2022-06-24 | End: 2022-06-24

## 2022-06-24 RX ORDER — KETOROLAC TROMETHAMINE 15 MG/ML
15 INJECTION, SOLUTION INTRAMUSCULAR; INTRAVENOUS EVERY 6 HOURS
Status: COMPLETED | OUTPATIENT
Start: 2022-06-24 | End: 2022-06-25

## 2022-06-24 RX ORDER — HYDROMORPHONE HYDROCHLORIDE 1 MG/ML
0.4 INJECTION, SOLUTION INTRAMUSCULAR; INTRAVENOUS; SUBCUTANEOUS EVERY 5 MIN PRN
Status: DISCONTINUED | OUTPATIENT
Start: 2022-06-24 | End: 2022-06-24 | Stop reason: HOSPADM

## 2022-06-24 RX ORDER — HYDROCODONE BITARTRATE AND ACETAMINOPHEN 5; 325 MG/1; MG/1
2 TABLET ORAL ONCE AS NEEDED
Status: DISCONTINUED | OUTPATIENT
Start: 2022-06-24 | End: 2022-06-24 | Stop reason: HOSPADM

## 2022-06-24 RX ORDER — EPHEDRINE SULFATE 50 MG/ML
INJECTION INTRAVENOUS AS NEEDED
Status: DISCONTINUED | OUTPATIENT
Start: 2022-06-24 | End: 2022-06-24 | Stop reason: SURG

## 2022-06-24 RX ORDER — DIPHENHYDRAMINE HYDROCHLORIDE 50 MG/ML
12.5 INJECTION INTRAMUSCULAR; INTRAVENOUS EVERY 4 HOURS PRN
Status: DISCONTINUED | OUTPATIENT
Start: 2022-06-24 | End: 2022-06-25

## 2022-06-24 RX ORDER — METOCLOPRAMIDE HYDROCHLORIDE 5 MG/ML
INJECTION INTRAMUSCULAR; INTRAVENOUS AS NEEDED
Status: DISCONTINUED | OUTPATIENT
Start: 2022-06-24 | End: 2022-06-24 | Stop reason: SURG

## 2022-06-24 RX ORDER — ACETAMINOPHEN 500 MG
1000 TABLET ORAL ONCE AS NEEDED
Status: DISCONTINUED | OUTPATIENT
Start: 2022-06-24 | End: 2022-06-24 | Stop reason: HOSPADM

## 2022-06-24 RX ORDER — MEPERIDINE HYDROCHLORIDE 25 MG/ML
12.5 INJECTION INTRAMUSCULAR; INTRAVENOUS; SUBCUTANEOUS AS NEEDED
Status: DISCONTINUED | OUTPATIENT
Start: 2022-06-24 | End: 2022-06-24 | Stop reason: HOSPADM

## 2022-06-24 RX ORDER — DULOXETIN HYDROCHLORIDE 60 MG/1
60 CAPSULE, DELAYED RELEASE ORAL DAILY
COMMUNITY

## 2022-06-24 RX ORDER — ENOXAPARIN SODIUM 100 MG/ML
40 INJECTION SUBCUTANEOUS DAILY
Status: DISCONTINUED | OUTPATIENT
Start: 2022-06-24 | End: 2022-06-25

## 2022-06-24 RX ORDER — POLYETHYLENE GLYCOL 3350 17 G/17G
17 POWDER, FOR SOLUTION ORAL DAILY PRN
Status: DISCONTINUED | OUTPATIENT
Start: 2022-06-24 | End: 2022-06-25

## 2022-06-24 RX ORDER — HEPARIN SODIUM 5000 [USP'U]/ML
5000 INJECTION, SOLUTION INTRAVENOUS; SUBCUTANEOUS ONCE
Status: COMPLETED | OUTPATIENT
Start: 2022-06-24 | End: 2022-06-24

## 2022-06-24 RX ORDER — CLINDAMYCIN PHOSPHATE 900 MG/50ML
900 INJECTION INTRAVENOUS ONCE
Status: COMPLETED | OUTPATIENT
Start: 2022-06-24 | End: 2022-06-24

## 2022-06-24 RX ORDER — LEVOTHYROXINE SODIUM 0.07 MG/1
75 TABLET ORAL
Status: DISCONTINUED | OUTPATIENT
Start: 2022-06-25 | End: 2022-06-25

## 2022-06-24 RX ORDER — BUPIVACAINE HYDROCHLORIDE 5 MG/ML
INJECTION, SOLUTION EPIDURAL; INTRACAUDAL AS NEEDED
Status: DISCONTINUED | OUTPATIENT
Start: 2022-06-24 | End: 2022-06-24 | Stop reason: HOSPADM

## 2022-06-24 RX ORDER — ONDANSETRON 2 MG/ML
4 INJECTION INTRAMUSCULAR; INTRAVENOUS EVERY 6 HOURS PRN
Status: DISCONTINUED | OUTPATIENT
Start: 2022-06-24 | End: 2022-06-24 | Stop reason: HOSPADM

## 2022-06-24 RX ORDER — IBUPROFEN 600 MG/1
600 TABLET ORAL EVERY 6 HOURS SCHEDULED
Status: DISCONTINUED | OUTPATIENT
Start: 2022-06-25 | End: 2022-06-25

## 2022-06-24 RX ORDER — MIDAZOLAM HYDROCHLORIDE 1 MG/ML
INJECTION INTRAMUSCULAR; INTRAVENOUS AS NEEDED
Status: DISCONTINUED | OUTPATIENT
Start: 2022-06-24 | End: 2022-06-24 | Stop reason: SURG

## 2022-06-24 RX ORDER — SODIUM PHOSPHATE, DIBASIC AND SODIUM PHOSPHATE, MONOBASIC 7; 19 G/133ML; G/133ML
1 ENEMA RECTAL ONCE AS NEEDED
Status: DISCONTINUED | OUTPATIENT
Start: 2022-06-24 | End: 2022-06-25

## 2022-06-24 RX ORDER — LIDOCAINE HYDROCHLORIDE 10 MG/ML
INJECTION, SOLUTION EPIDURAL; INFILTRATION; INTRACAUDAL; PERINEURAL AS NEEDED
Status: DISCONTINUED | OUTPATIENT
Start: 2022-06-24 | End: 2022-06-24 | Stop reason: SURG

## 2022-06-24 RX ORDER — SODIUM CHLORIDE, SODIUM LACTATE, POTASSIUM CHLORIDE, CALCIUM CHLORIDE 600; 310; 30; 20 MG/100ML; MG/100ML; MG/100ML; MG/100ML
INJECTION, SOLUTION INTRAVENOUS CONTINUOUS
Status: DISCONTINUED | OUTPATIENT
Start: 2022-06-24 | End: 2022-06-24 | Stop reason: HOSPADM

## 2022-06-24 RX ORDER — ROCURONIUM BROMIDE 10 MG/ML
INJECTION, SOLUTION INTRAVENOUS AS NEEDED
Status: DISCONTINUED | OUTPATIENT
Start: 2022-06-24 | End: 2022-06-24 | Stop reason: SURG

## 2022-06-24 RX ORDER — MIDAZOLAM HYDROCHLORIDE 1 MG/ML
1 INJECTION INTRAMUSCULAR; INTRAVENOUS EVERY 5 MIN PRN
Status: DISCONTINUED | OUTPATIENT
Start: 2022-06-24 | End: 2022-06-24 | Stop reason: HOSPADM

## 2022-06-24 RX ORDER — OXYCODONE HYDROCHLORIDE 5 MG/1
2.5 TABLET ORAL EVERY 4 HOURS PRN
Status: DISCONTINUED | OUTPATIENT
Start: 2022-06-24 | End: 2022-06-25

## 2022-06-24 RX ORDER — FLUTICASONE FUROATE AND VILANTEROL 200; 25 UG/1; UG/1
1 POWDER RESPIRATORY (INHALATION) DAILY
Status: DISCONTINUED | OUTPATIENT
Start: 2022-06-24 | End: 2022-06-25

## 2022-06-24 RX ORDER — HYDROMORPHONE HYDROCHLORIDE 1 MG/ML
0.2 INJECTION, SOLUTION INTRAMUSCULAR; INTRAVENOUS; SUBCUTANEOUS EVERY 2 HOUR PRN
Status: DISCONTINUED | OUTPATIENT
Start: 2022-06-24 | End: 2022-06-25

## 2022-06-24 RX ORDER — HEPARIN SODIUM 5000 [USP'U]/ML
INJECTION, SOLUTION INTRAVENOUS; SUBCUTANEOUS
Status: COMPLETED
Start: 2022-06-24 | End: 2022-06-24

## 2022-06-24 RX ORDER — ACETAMINOPHEN 500 MG
1000 TABLET ORAL ONCE
Status: DISCONTINUED | OUTPATIENT
Start: 2022-06-24 | End: 2022-06-24 | Stop reason: HOSPADM

## 2022-06-24 RX ORDER — OXYCODONE HYDROCHLORIDE 5 MG/1
5 TABLET ORAL EVERY 4 HOURS PRN
Status: DISCONTINUED | OUTPATIENT
Start: 2022-06-24 | End: 2022-06-25

## 2022-06-24 RX ORDER — METOPROLOL TARTRATE 5 MG/5ML
2.5 INJECTION INTRAVENOUS ONCE
Status: DISCONTINUED | OUTPATIENT
Start: 2022-06-24 | End: 2022-06-24 | Stop reason: HOSPADM

## 2022-06-24 RX ORDER — BISACODYL 10 MG
10 SUPPOSITORY, RECTAL RECTAL
Status: DISCONTINUED | OUTPATIENT
Start: 2022-06-24 | End: 2022-06-25

## 2022-06-24 RX ORDER — ROSUVASTATIN CALCIUM 20 MG/1
20 TABLET, COATED ORAL NIGHTLY
Status: DISCONTINUED | OUTPATIENT
Start: 2022-06-24 | End: 2022-06-25

## 2022-06-24 RX ORDER — DEXAMETHASONE SODIUM PHOSPHATE 4 MG/ML
VIAL (ML) INJECTION AS NEEDED
Status: DISCONTINUED | OUTPATIENT
Start: 2022-06-24 | End: 2022-06-24 | Stop reason: SURG

## 2022-06-24 RX ADMIN — ROCURONIUM BROMIDE 40 MG: 10 INJECTION, SOLUTION INTRAVENOUS at 07:35:00

## 2022-06-24 RX ADMIN — LIDOCAINE HYDROCHLORIDE 5 ML: 10 INJECTION, SOLUTION EPIDURAL; INFILTRATION; INTRACAUDAL; PERINEURAL at 07:34:00

## 2022-06-24 RX ADMIN — EPHEDRINE SULFATE 5 MG: 50 INJECTION INTRAVENOUS at 08:00:00

## 2022-06-24 RX ADMIN — EPHEDRINE SULFATE 5 MG: 50 INJECTION INTRAVENOUS at 07:58:00

## 2022-06-24 RX ADMIN — ROCURONIUM BROMIDE 10 MG: 10 INJECTION, SOLUTION INTRAVENOUS at 09:23:00

## 2022-06-24 RX ADMIN — MIDAZOLAM HYDROCHLORIDE 2 MG: 1 INJECTION INTRAMUSCULAR; INTRAVENOUS at 07:30:00

## 2022-06-24 RX ADMIN — SODIUM CHLORIDE, SODIUM LACTATE, POTASSIUM CHLORIDE, CALCIUM CHLORIDE: 600; 310; 30; 20 INJECTION, SOLUTION INTRAVENOUS at 07:30:00

## 2022-06-24 RX ADMIN — SODIUM CHLORIDE, SODIUM LACTATE, POTASSIUM CHLORIDE, CALCIUM CHLORIDE: 600; 310; 30; 20 INJECTION, SOLUTION INTRAVENOUS at 11:00:00

## 2022-06-24 RX ADMIN — SODIUM CHLORIDE, SODIUM LACTATE, POTASSIUM CHLORIDE, CALCIUM CHLORIDE: 600; 310; 30; 20 INJECTION, SOLUTION INTRAVENOUS at 11:20:00

## 2022-06-24 RX ADMIN — ROCURONIUM BROMIDE 10 MG: 10 INJECTION, SOLUTION INTRAVENOUS at 08:13:00

## 2022-06-24 RX ADMIN — CLINDAMYCIN PHOSPHATE 900 MG: 900 INJECTION INTRAVENOUS at 07:40:00

## 2022-06-24 RX ADMIN — KETOROLAC TROMETHAMINE 30 MG: 30 INJECTION, SOLUTION INTRAMUSCULAR; INTRAVENOUS at 11:05:00

## 2022-06-24 RX ADMIN — ROCURONIUM BROMIDE 10 MG: 10 INJECTION, SOLUTION INTRAVENOUS at 10:45:00

## 2022-06-24 RX ADMIN — ONDANSETRON 4 MG: 2 INJECTION INTRAMUSCULAR; INTRAVENOUS at 11:00:00

## 2022-06-24 RX ADMIN — DEXAMETHASONE SODIUM PHOSPHATE 4 MG: 4 MG/ML VIAL (ML) INJECTION at 07:40:00

## 2022-06-24 RX ADMIN — EPHEDRINE SULFATE 10 MG: 50 INJECTION INTRAVENOUS at 08:08:00

## 2022-06-24 RX ADMIN — METOCLOPRAMIDE HYDROCHLORIDE 10 MG: 5 INJECTION INTRAMUSCULAR; INTRAVENOUS at 10:50:00

## 2022-06-24 RX ADMIN — EPHEDRINE SULFATE 10 MG: 50 INJECTION INTRAVENOUS at 11:06:00

## 2022-06-24 NOTE — PLAN OF CARE
NURSING ADMISSION NOTE      Patient admitted via BED. From PACU, awake, a/o x4. Denies nausea, denies incisional pain. Some discomfort reported with the lui catheter being in. Adjusted, drained. Will monitor. Clear yellow urine noted. Lap sites x 4 with DB c/d/i. BS present, active x 4 quadrants. VSS, afebrile. Admission database completed, Advance directives copy placed in chart. Oriented to room. Safety precautions initiated. Bed in low position. Call light in reach.

## 2022-06-24 NOTE — BRIEF OP NOTE
6/24/2022   Pre-Operative Diagnosis: Ovarian cyst, right [N83.201]  Thickened endometrium [R93.89]     Post-Operative Diagnosis: Ovarian cyst, right [N83.201]Thickened endometrium [R93.89] Pelvic adhesions      Procedure Performed:   Robot assisted total laparoscopic hysterectomy, bilateral salpingo-oophorectomy, pelvic washings, cystoscopy, lysis of adhesions, and bilateral ureterolysis    Surgeon(s) and Role:     Jeannie Toledo MD - Primary    Assistant(s):  Surgical Assistant.: Jaleesa Small     Surgical Findings: Patient with diffuse eczema like rash on multiple areas of her body including torso, inner thighs etc prior to antibiotic or surgical prep administration. Vulva chronic changes likely lichen sclerosis. Small uterus and cervix. Right ovary enlarged and cystic approximately 6 cm. Right fallopian tube normal. Left fallopian tube adherent to left pelvic side wall. Bowel adhesions to left sidewall. Left probable ovary small and densely adherent to pelvic side wall. Bowel adhesions to posterior cul de sac bilaterally. Suspect patient had endometriosis at one point. Specimen: Uterus with cervix and left fallopian tube and ovary. Right ovary with fallopian tube.       Estimated Blood Loss: Blood Output: 100 mL (6/24/2022 11:16 AM)      Dictation Number:  To follow     Keisha Wharton MD  6/24/2022  11:27 AM

## 2022-06-24 NOTE — ANESTHESIA PROCEDURE NOTES
Airway  Date/Time: 6/24/2022 7:38 AM  Urgency: elective      General Information and Staff    Patient location during procedure: OR  Anesthesiologist: Nathaniel Patel MD  Performed: anesthesiologist     Indications and Patient Condition  Indications for airway management: anesthesia  Spontaneous Ventilation: absent  Sedation level: deep  Preoxygenated: yes  Patient position: sniffing  Mask difficulty assessment: 1 - vent by mask    Final Airway Details  Final airway type: endotracheal airway      Successful airway: ETT  Cuffed: yes   Successful intubation technique: Video laryngoscopy  Endotracheal tube insertion site: oral  Blade: GlideScope  Blade size: #3  ETT size (mm): 7.0    Cormack-Lehane Classification: grade I - full view of glottis  Placement verified by: chest auscultation and capnometry   Measured from: lips  ETT to lips (cm): 21  Number of attempts at approach: 1

## 2022-06-25 VITALS
HEIGHT: 66.5 IN | WEIGHT: 194 LBS | RESPIRATION RATE: 22 BRPM | TEMPERATURE: 98 F | OXYGEN SATURATION: 95 % | HEART RATE: 64 BPM | SYSTOLIC BLOOD PRESSURE: 116 MMHG | DIASTOLIC BLOOD PRESSURE: 60 MMHG | BODY MASS INDEX: 30.81 KG/M2

## 2022-06-25 PROBLEM — Z01.818 PRE-OP TESTING: Status: ACTIVE | Noted: 2018-11-02

## 2022-06-25 PROBLEM — Z01.818 PRE-OP TESTING: Status: RESOLVED | Noted: 2018-11-02 | Resolved: 2022-06-25

## 2022-06-25 LAB
ANION GAP SERPL CALC-SCNC: 4 MMOL/L (ref 0–18)
BASOPHILS # BLD AUTO: 0.02 X10(3) UL (ref 0–0.2)
BASOPHILS NFR BLD AUTO: 0.2 %
BUN BLD-MCNC: 17 MG/DL (ref 7–18)
CALCIUM BLD-MCNC: 8.8 MG/DL (ref 8.5–10.1)
CHLORIDE SERPL-SCNC: 108 MMOL/L (ref 98–112)
CO2 SERPL-SCNC: 28 MMOL/L (ref 21–32)
CREAT BLD-MCNC: 0.95 MG/DL
EOSINOPHIL # BLD AUTO: 0 X10(3) UL (ref 0–0.7)
EOSINOPHIL NFR BLD AUTO: 0 %
ERYTHROCYTE [DISTWIDTH] IN BLOOD BY AUTOMATED COUNT: 13 %
GLUCOSE BLD-MCNC: 110 MG/DL (ref 70–99)
HCT VFR BLD AUTO: 34.2 %
HGB BLD-MCNC: 11.2 G/DL
IMM GRANULOCYTES # BLD AUTO: 0.05 X10(3) UL (ref 0–1)
IMM GRANULOCYTES NFR BLD: 0.5 %
LYMPHOCYTES # BLD AUTO: 1.25 X10(3) UL (ref 1–4)
LYMPHOCYTES NFR BLD AUTO: 12.3 %
MCH RBC QN AUTO: 28.6 PG (ref 26–34)
MCHC RBC AUTO-ENTMCNC: 32.7 G/DL (ref 31–37)
MCV RBC AUTO: 87.2 FL
MONOCYTES # BLD AUTO: 1.1 X10(3) UL (ref 0.1–1)
MONOCYTES NFR BLD AUTO: 10.8 %
NEUTROPHILS # BLD AUTO: 7.77 X10 (3) UL (ref 1.5–7.7)
NEUTROPHILS # BLD AUTO: 7.77 X10(3) UL (ref 1.5–7.7)
NEUTROPHILS NFR BLD AUTO: 76.2 %
OSMOLALITY SERPL CALC.SUM OF ELEC: 292 MOSM/KG (ref 275–295)
PLATELET # BLD AUTO: 141 10(3)UL (ref 150–450)
POTASSIUM SERPL-SCNC: 4.6 MMOL/L (ref 3.5–5.1)
RBC # BLD AUTO: 3.92 X10(6)UL
SODIUM SERPL-SCNC: 140 MMOL/L (ref 136–145)
WBC # BLD AUTO: 10.2 X10(3) UL (ref 4–11)

## 2022-06-25 PROCEDURE — 94640 AIRWAY INHALATION TREATMENT: CPT

## 2022-06-25 PROCEDURE — 80048 BASIC METABOLIC PNL TOTAL CA: CPT | Performed by: OBSTETRICS & GYNECOLOGY

## 2022-06-25 PROCEDURE — 85025 COMPLETE CBC W/AUTO DIFF WBC: CPT | Performed by: OBSTETRICS & GYNECOLOGY

## 2022-06-25 RX ORDER — HYDROCODONE BITARTRATE AND ACETAMINOPHEN 5; 325 MG/1; MG/1
1-2 TABLET ORAL EVERY 4 HOURS PRN
Qty: 12 TABLET | Refills: 0 | Status: SHIPPED | OUTPATIENT
Start: 2022-06-25 | End: 2022-06-28

## 2022-06-25 NOTE — PLAN OF CARE
NURSING DISCHARGE NOTE    The patient is tolerating diet, pain is controlled with ATC Toradol and Tylenol. Voided multiple times. No issues. Ambulated in halls with a steady gait. Seen by norma Esquivel to go home. Discharging instructions reviewed, all questions answered. Voiced understanding. PIV removed, catheter was intact. Discharged Home via ambulatory, gait was steady. .  Accompanied by spouse. Belongings Taken by patient/family.

## 2022-06-28 LAB — NON GYNE INTERPRETATION: NEGATIVE

## 2022-07-10 RX ORDER — MOMETASONE FUROATE 1 MG/G
OINTMENT TOPICAL
Qty: 45 G | Refills: 3 | Status: CANCELLED
Start: 2022-07-10

## 2022-07-11 ENCOUNTER — OFFICE VISIT (OUTPATIENT)
Dept: OBGYN CLINIC | Facility: CLINIC | Age: 67
End: 2022-07-11
Payer: MEDICARE

## 2022-07-11 VITALS
SYSTOLIC BLOOD PRESSURE: 122 MMHG | WEIGHT: 192.63 LBS | DIASTOLIC BLOOD PRESSURE: 78 MMHG | HEIGHT: 66.5 IN | HEART RATE: 89 BPM | BODY MASS INDEX: 30.59 KG/M2

## 2022-07-11 DIAGNOSIS — N76.0 VAGINAL CUFF CELLULITIS: Primary | ICD-10-CM

## 2022-07-11 DIAGNOSIS — Z48.89 POSTOPERATIVE VISIT: ICD-10-CM

## 2022-07-11 DIAGNOSIS — N76.3 CHRONIC VULVITIS: ICD-10-CM

## 2022-07-11 DIAGNOSIS — Z90.722 S/P BILATERAL SALPINGO-OOPHORECTOMY: ICD-10-CM

## 2022-07-11 DIAGNOSIS — Z90.710 STATUS POST LAPAROSCOPIC HYSTERECTOMY: ICD-10-CM

## 2022-07-11 RX ORDER — METRONIDAZOLE 500 MG/1
500 TABLET ORAL 2 TIMES DAILY
Qty: 28 TABLET | Refills: 0 | Status: SHIPPED | OUTPATIENT
Start: 2022-07-11 | End: 2022-07-25

## 2022-07-11 RX ORDER — DOXYCYCLINE HYCLATE 100 MG
100 TABLET ORAL 2 TIMES DAILY
Qty: 28 TABLET | Refills: 0 | Status: SHIPPED | OUTPATIENT
Start: 2022-07-11 | End: 2022-07-25

## 2022-07-11 RX ORDER — FLUCONAZOLE 150 MG/1
150 TABLET ORAL
Qty: 3 TABLET | Refills: 0 | Status: SHIPPED | OUTPATIENT
Start: 2022-07-11 | End: 2022-07-18

## 2022-07-11 RX ORDER — NYSTATIN 100000 U/G
1 OINTMENT TOPICAL 2 TIMES DAILY
Qty: 30 G | Refills: 1 | Status: SHIPPED | OUTPATIENT
Start: 2022-07-11 | End: 2022-08-08

## 2022-07-25 ENCOUNTER — OFFICE VISIT (OUTPATIENT)
Dept: OBGYN CLINIC | Facility: CLINIC | Age: 67
End: 2022-07-25
Payer: MEDICARE

## 2022-07-25 VITALS
HEIGHT: 66.5 IN | WEIGHT: 184.38 LBS | DIASTOLIC BLOOD PRESSURE: 78 MMHG | SYSTOLIC BLOOD PRESSURE: 118 MMHG | BODY MASS INDEX: 29.28 KG/M2

## 2022-07-25 DIAGNOSIS — N76.0 VAGINAL CUFF CELLULITIS: Primary | ICD-10-CM

## 2022-07-25 DIAGNOSIS — N76.3 CHRONIC VULVITIS: ICD-10-CM

## 2022-07-25 DIAGNOSIS — N90.89 VULVAR LESION: ICD-10-CM

## 2022-07-25 DIAGNOSIS — Z90.722 S/P BILATERAL SALPINGO-OOPHORECTOMY: ICD-10-CM

## 2022-07-25 DIAGNOSIS — Z90.710 STATUS POST LAPAROSCOPIC HYSTERECTOMY: ICD-10-CM

## 2022-07-25 PROCEDURE — 88305 TISSUE EXAM BY PATHOLOGIST: CPT | Performed by: OBSTETRICS & GYNECOLOGY

## 2022-07-28 PROBLEM — N90.4 LICHEN SCLEROSUS OF VULVA: Status: ACTIVE | Noted: 2022-07-25

## 2022-08-05 DIAGNOSIS — E78.5 DYSLIPIDEMIA: ICD-10-CM

## 2022-08-05 RX ORDER — ROSUVASTATIN CALCIUM 20 MG/1
TABLET, COATED ORAL
Qty: 90 TABLET | Refills: 1 | Status: SHIPPED | OUTPATIENT
Start: 2022-08-05

## 2022-08-07 PROBLEM — N76.0 VAGINAL CUFF CELLULITIS: Status: RESOLVED | Noted: 2022-07-11 | Resolved: 2022-08-07

## 2022-08-07 PROBLEM — N83.201 OVARIAN CYST, RIGHT: Status: RESOLVED | Noted: 2022-03-07 | Resolved: 2022-08-07

## 2022-08-07 PROBLEM — R93.89 THICKENED ENDOMETRIUM: Status: RESOLVED | Noted: 2022-03-07 | Resolved: 2022-08-07

## 2022-08-07 RX ORDER — CLOBETASOL PROPIONATE 0.5 MG/G
OINTMENT TOPICAL
Qty: 60 G | Refills: 1 | Status: CANCELLED
Start: 2022-08-07

## 2022-08-08 ENCOUNTER — OFFICE VISIT (OUTPATIENT)
Dept: OBGYN CLINIC | Facility: CLINIC | Age: 67
End: 2022-08-08
Payer: MEDICARE

## 2022-08-08 VITALS
HEIGHT: 66.5 IN | WEIGHT: 185.81 LBS | DIASTOLIC BLOOD PRESSURE: 74 MMHG | SYSTOLIC BLOOD PRESSURE: 120 MMHG | BODY MASS INDEX: 29.51 KG/M2

## 2022-08-08 DIAGNOSIS — N90.4 LICHEN SCLEROSUS OF VULVA: ICD-10-CM

## 2022-08-08 DIAGNOSIS — Z90.710 STATUS POST LAPAROSCOPIC HYSTERECTOMY: ICD-10-CM

## 2022-08-08 DIAGNOSIS — Z90.722 S/P BILATERAL SALPINGO-OOPHORECTOMY: ICD-10-CM

## 2022-08-08 DIAGNOSIS — N76.3 CHRONIC VULVITIS: ICD-10-CM

## 2022-08-08 DIAGNOSIS — Z48.89 POSTOPERATIVE VISIT: Primary | ICD-10-CM

## 2022-08-08 PROCEDURE — 3008F BODY MASS INDEX DOCD: CPT | Performed by: OBSTETRICS & GYNECOLOGY

## 2022-08-08 PROCEDURE — 3074F SYST BP LT 130 MM HG: CPT | Performed by: OBSTETRICS & GYNECOLOGY

## 2022-08-08 PROCEDURE — 99024 POSTOP FOLLOW-UP VISIT: CPT | Performed by: OBSTETRICS & GYNECOLOGY

## 2022-08-08 PROCEDURE — 3078F DIAST BP <80 MM HG: CPT | Performed by: OBSTETRICS & GYNECOLOGY

## 2022-08-09 RX ORDER — DULOXETIN HYDROCHLORIDE 60 MG/1
CAPSULE, DELAYED RELEASE ORAL
Qty: 30 CAPSULE | Refills: 2 | Status: SHIPPED | OUTPATIENT
Start: 2022-08-09

## 2022-11-07 RX ORDER — DULOXETIN HYDROCHLORIDE 60 MG/1
CAPSULE, DELAYED RELEASE ORAL
Qty: 30 CAPSULE | Refills: 2 | Status: SHIPPED | OUTPATIENT
Start: 2022-11-07

## 2022-11-07 RX ORDER — METOPROLOL TARTRATE 50 MG/1
TABLET, FILM COATED ORAL
Qty: 45 TABLET | Refills: 0 | Status: SHIPPED | OUTPATIENT
Start: 2022-11-07

## 2022-11-07 RX ORDER — PRAVASTATIN SODIUM 80 MG/1
TABLET ORAL
Qty: 90 TABLET | Refills: 1 | OUTPATIENT
Start: 2022-11-07

## 2022-12-08 ENCOUNTER — TELEPHONE (OUTPATIENT)
Dept: INTERNAL MEDICINE CLINIC | Facility: CLINIC | Age: 67
End: 2022-12-08

## 2023-01-06 DIAGNOSIS — E03.9 HYPOTHYROIDISM, UNSPECIFIED TYPE: ICD-10-CM

## 2023-01-06 RX ORDER — LEVOTHYROXINE SODIUM 0.07 MG/1
75 TABLET ORAL
Qty: 90 TABLET | Refills: 0 | Status: SHIPPED | OUTPATIENT
Start: 2023-01-06

## 2023-01-12 ENCOUNTER — TELEPHONE (OUTPATIENT)
Dept: INTERNAL MEDICINE CLINIC | Facility: CLINIC | Age: 68
End: 2023-01-12

## 2023-01-12 NOTE — TELEPHONE ENCOUNTER
Received [employer] recommendation form from [employer]. Put in TB bin for review and signature.

## 2023-01-30 ENCOUNTER — TELEPHONE (OUTPATIENT)
Dept: INTERNAL MEDICINE CLINIC | Facility: CLINIC | Age: 68
End: 2023-01-30

## 2023-01-30 DIAGNOSIS — L65.9 HAIR LOSS: Primary | ICD-10-CM

## 2023-01-30 DIAGNOSIS — I10 PRIMARY HYPERTENSION: ICD-10-CM

## 2023-01-30 DIAGNOSIS — E78.5 DYSLIPIDEMIA: ICD-10-CM

## 2023-01-30 NOTE — TELEPHONE ENCOUNTER
Future Appointments   Date Time Provider Radha Fartun   4/6/2023  1:40 PM Evon Berman MD [facility]     Patient would like to do her labs sooner than later because she states that just recently patient is losing a lot of hair. Patient's daughter(hairdresser) even stated that her hair is so much thinner. Please call patient and let her know if labs are ordered.   [phone]

## 2023-02-03 RX ORDER — DULOXETIN HYDROCHLORIDE 60 MG/1
CAPSULE, DELAYED RELEASE ORAL
Qty: 30 CAPSULE | Refills: 2 | Status: SHIPPED | OUTPATIENT
Start: 2023-02-03

## 2023-02-03 RX ORDER — METOPROLOL TARTRATE 50 MG/1
TABLET, FILM COATED ORAL
Qty: 45 TABLET | Refills: 0 | Status: SHIPPED | OUTPATIENT
Start: 2023-02-03

## 2023-03-08 ENCOUNTER — LAB ENCOUNTER (OUTPATIENT)
Dept: LAB | Age: 68
End: 2023-03-08
Attending: INTERNAL MEDICINE
Payer: MEDICARE

## 2023-03-08 DIAGNOSIS — L65.9 HAIR LOSS: ICD-10-CM

## 2023-03-08 DIAGNOSIS — E78.5 DYSLIPIDEMIA: ICD-10-CM

## 2023-03-08 LAB
ALBUMIN SERPL-MCNC: 4.2 G/DL (ref 3.4–5)
ALBUMIN/GLOB SERPL: 1.3 {RATIO} (ref 1–2)
ALP LIVER SERPL-CCNC: 88 U/L
ALT SERPL-CCNC: 26 U/L
ANION GAP SERPL CALC-SCNC: 1 MMOL/L (ref 0–18)
AST SERPL-CCNC: 29 U/L (ref 15–37)
BASOPHILS # BLD AUTO: 0.05 X10(3) UL (ref 0–0.2)
BASOPHILS NFR BLD AUTO: 0.5 %
BILIRUB SERPL-MCNC: 0.9 MG/DL (ref 0.1–2)
BUN BLD-MCNC: 17 MG/DL (ref 7–18)
CALCIUM BLD-MCNC: 10 MG/DL (ref 8.5–10.1)
CHLORIDE SERPL-SCNC: 106 MMOL/L (ref 98–112)
CHOLEST SERPL-MCNC: 140 MG/DL (ref ?–200)
CO2 SERPL-SCNC: 30 MMOL/L (ref 21–32)
CREAT BLD-MCNC: 0.97 MG/DL
DEPRECATED HBV CORE AB SER IA-ACNC: 106 NG/ML
EOSINOPHIL # BLD AUTO: 0.05 X10(3) UL (ref 0–0.7)
EOSINOPHIL NFR BLD AUTO: 0.5 %
ERYTHROCYTE [DISTWIDTH] IN BLOOD BY AUTOMATED COUNT: 13.1 %
FASTING PATIENT LIPID ANSWER: YES
FASTING STATUS PATIENT QL REPORTED: YES
GFR SERPLBLD BASED ON 1.73 SQ M-ARVRAT: 63 ML/MIN/1.73M2 (ref 60–?)
GLOBULIN PLAS-MCNC: 3.3 G/DL (ref 2.8–4.4)
GLUCOSE BLD-MCNC: 107 MG/DL (ref 70–99)
HCT VFR BLD AUTO: 47.6 %
HDLC SERPL-MCNC: 40 MG/DL (ref 40–59)
HGB BLD-MCNC: 14.8 G/DL
IMM GRANULOCYTES # BLD AUTO: 0.04 X10(3) UL (ref 0–1)
IMM GRANULOCYTES NFR BLD: 0.4 %
IRON SATN MFR SERPL: 24 %
IRON SERPL-MCNC: 110 UG/DL
LDLC SERPL CALC-MCNC: 78 MG/DL (ref ?–100)
LYMPHOCYTES # BLD AUTO: 2.38 X10(3) UL (ref 1–4)
LYMPHOCYTES NFR BLD AUTO: 25.5 %
MCH RBC QN AUTO: 28.2 PG (ref 26–34)
MCHC RBC AUTO-ENTMCNC: 31.1 G/DL (ref 31–37)
MCV RBC AUTO: 90.7 FL
MONOCYTES # BLD AUTO: 0.91 X10(3) UL (ref 0.1–1)
MONOCYTES NFR BLD AUTO: 9.8 %
NEUTROPHILS # BLD AUTO: 5.89 X10 (3) UL (ref 1.5–7.7)
NEUTROPHILS # BLD AUTO: 5.89 X10(3) UL (ref 1.5–7.7)
NEUTROPHILS NFR BLD AUTO: 63.3 %
NONHDLC SERPL-MCNC: 100 MG/DL (ref ?–130)
OSMOLALITY SERPL CALC.SUM OF ELEC: 286 MOSM/KG (ref 275–295)
PLATELET # BLD AUTO: 258 10(3)UL (ref 150–450)
POTASSIUM SERPL-SCNC: 5.1 MMOL/L (ref 3.5–5.1)
PROT SERPL-MCNC: 7.5 G/DL (ref 6.4–8.2)
RBC # BLD AUTO: 5.25 X10(6)UL
SODIUM SERPL-SCNC: 137 MMOL/L (ref 136–145)
TIBC SERPL-MCNC: 450 UG/DL (ref 240–450)
TRANSFERRIN SERPL-MCNC: 302 MG/DL (ref 200–360)
TRIGL SERPL-MCNC: 121 MG/DL (ref 30–149)
TSI SER-ACNC: 2.06 MIU/ML (ref 0.36–3.74)
VLDLC SERPL CALC-MCNC: 19 MG/DL (ref 0–30)
WBC # BLD AUTO: 9.3 X10(3) UL (ref 4–11)

## 2023-03-08 PROCEDURE — 82728 ASSAY OF FERRITIN: CPT

## 2023-03-08 PROCEDURE — 80053 COMPREHEN METABOLIC PANEL: CPT

## 2023-03-08 PROCEDURE — 83550 IRON BINDING TEST: CPT

## 2023-03-08 PROCEDURE — 85025 COMPLETE CBC W/AUTO DIFF WBC: CPT

## 2023-03-08 PROCEDURE — 36415 COLL VENOUS BLD VENIPUNCTURE: CPT

## 2023-03-08 PROCEDURE — 84443 ASSAY THYROID STIM HORMONE: CPT

## 2023-03-08 PROCEDURE — 80061 LIPID PANEL: CPT

## 2023-03-08 PROCEDURE — 83540 ASSAY OF IRON: CPT

## 2023-03-26 DIAGNOSIS — E78.5 DYSLIPIDEMIA: ICD-10-CM

## 2023-03-28 RX ORDER — ROSUVASTATIN CALCIUM 20 MG/1
TABLET, COATED ORAL
Qty: 90 TABLET | Refills: 1 | Status: SHIPPED | OUTPATIENT
Start: 2023-03-28

## 2023-04-25 ENCOUNTER — OFFICE VISIT (OUTPATIENT)
Dept: INTERNAL MEDICINE CLINIC | Facility: CLINIC | Age: 68
End: 2023-04-25
Payer: MEDICARE

## 2023-04-25 VITALS
OXYGEN SATURATION: 94 % | HEIGHT: 66 IN | HEART RATE: 62 BPM | TEMPERATURE: 99 F | DIASTOLIC BLOOD PRESSURE: 76 MMHG | BODY MASS INDEX: 30.22 KG/M2 | RESPIRATION RATE: 16 BRPM | SYSTOLIC BLOOD PRESSURE: 124 MMHG | WEIGHT: 188 LBS

## 2023-04-25 DIAGNOSIS — Z00.00 ENCOUNTER FOR ANNUAL HEALTH EXAMINATION: Primary | ICD-10-CM

## 2023-04-25 DIAGNOSIS — B35.1 ONYCHOMYCOSIS: ICD-10-CM

## 2023-04-25 DIAGNOSIS — R47.89 OTHER SPEECH DISTURBANCE: ICD-10-CM

## 2023-04-25 DIAGNOSIS — I70.0 ATHEROSCLEROSIS OF AORTA (HCC): ICD-10-CM

## 2023-04-25 DIAGNOSIS — N90.4 LICHEN SCLEROSUS OF VULVA: ICD-10-CM

## 2023-04-25 DIAGNOSIS — E78.5 DYSLIPIDEMIA: ICD-10-CM

## 2023-04-25 DIAGNOSIS — E03.8 OTHER SPECIFIED HYPOTHYROIDISM: ICD-10-CM

## 2023-04-25 DIAGNOSIS — R23.2 HOT FLASHES: ICD-10-CM

## 2023-04-25 DIAGNOSIS — M19.042 PRIMARY OSTEOARTHRITIS OF BOTH HANDS: ICD-10-CM

## 2023-04-25 DIAGNOSIS — Z86.010 PERSONAL HISTORY OF COLONIC POLYPS: ICD-10-CM

## 2023-04-25 DIAGNOSIS — Z86.79 HISTORY OF SUBARACHNOID HEMORRHAGE: ICD-10-CM

## 2023-04-25 DIAGNOSIS — Z90.710 STATUS POST LAPAROSCOPIC HYSTERECTOMY: ICD-10-CM

## 2023-04-25 DIAGNOSIS — R73.9 BLOOD GLUCOSE ELEVATED: ICD-10-CM

## 2023-04-25 DIAGNOSIS — I10 PRIMARY HYPERTENSION: ICD-10-CM

## 2023-04-25 DIAGNOSIS — E03.9 HYPOTHYROIDISM, UNSPECIFIED TYPE: ICD-10-CM

## 2023-04-25 DIAGNOSIS — M19.041 PRIMARY OSTEOARTHRITIS OF BOTH HANDS: ICD-10-CM

## 2023-04-25 DIAGNOSIS — J82.83 EOSINOPHILIC ASTHMA: ICD-10-CM

## 2023-04-25 PROBLEM — R47.9 SPEECH DISTURBANCE: Status: ACTIVE | Noted: 2023-04-25

## 2023-04-25 PROCEDURE — 96160 PT-FOCUSED HLTH RISK ASSMT: CPT | Performed by: INTERNAL MEDICINE

## 2023-04-25 PROCEDURE — 3074F SYST BP LT 130 MM HG: CPT | Performed by: INTERNAL MEDICINE

## 2023-04-25 PROCEDURE — G0439 PPPS, SUBSEQ VISIT: HCPCS | Performed by: INTERNAL MEDICINE

## 2023-04-25 PROCEDURE — 99214 OFFICE O/P EST MOD 30 MIN: CPT | Performed by: INTERNAL MEDICINE

## 2023-04-25 PROCEDURE — 3078F DIAST BP <80 MM HG: CPT | Performed by: INTERNAL MEDICINE

## 2023-04-25 PROCEDURE — 3008F BODY MASS INDEX DOCD: CPT | Performed by: INTERNAL MEDICINE

## 2023-04-25 PROCEDURE — 1125F AMNT PAIN NOTED PAIN PRSNT: CPT | Performed by: INTERNAL MEDICINE

## 2023-04-25 RX ORDER — LEVOTHYROXINE SODIUM 0.07 MG/1
75 TABLET ORAL
Qty: 90 TABLET | Refills: 3 | Status: SHIPPED | OUTPATIENT
Start: 2023-04-25

## 2023-04-25 RX ORDER — PREDNISONE 20 MG/1
TABLET ORAL
COMMUNITY
Start: 2023-03-27

## 2023-04-25 RX ORDER — CLINDAMYCIN HYDROCHLORIDE 150 MG/1
600 CAPSULE ORAL AS NEEDED
COMMUNITY
Start: 2022-11-10

## 2023-04-25 RX ORDER — MEPOLIZUMAB 100 MG/ML
100 INJECTION, SOLUTION SUBCUTANEOUS
Qty: 1 ML | Refills: 10 | COMMUNITY
Start: 2022-11-18 | End: 2023-09-23

## 2023-05-04 RX ORDER — DULOXETIN HYDROCHLORIDE 60 MG/1
CAPSULE, DELAYED RELEASE ORAL
Qty: 30 CAPSULE | Refills: 2 | Status: SHIPPED | OUTPATIENT
Start: 2023-05-04

## 2023-05-04 RX ORDER — METOPROLOL TARTRATE 50 MG/1
TABLET, FILM COATED ORAL
Qty: 45 TABLET | Refills: 0 | Status: SHIPPED | OUTPATIENT
Start: 2023-05-04

## 2023-05-04 NOTE — TELEPHONE ENCOUNTER
Last OV: 04/25/2023    No future appointments.      Latest labs: 03/08/2023    Latest RX: Metoprolol Tartrate 50 mg 02/03/2023, Duloxetine 02/03/2023     Per protocol, no cosign required

## 2023-05-17 ENCOUNTER — HOSPITAL ENCOUNTER (OUTPATIENT)
Dept: BONE DENSITY | Age: 68
Discharge: HOME | End: 2023-05-17
Attending: INTERNAL MEDICINE
Payer: MEDICARE

## 2023-05-17 ENCOUNTER — HOSPITAL ENCOUNTER (OUTPATIENT)
Dept: MAMMOGRAPHY | Age: 68
Discharge: HOME | End: 2023-05-17
Attending: INTERNAL MEDICINE
Payer: MEDICARE

## 2023-05-17 DIAGNOSIS — Z78.0 POST-MENOPAUSAL: ICD-10-CM

## 2023-05-17 DIAGNOSIS — Z12.31 ENCOUNTER FOR SCREENING MAMMOGRAM FOR MALIGNANT NEOPLASM OF BREAST: ICD-10-CM

## 2023-05-17 PROCEDURE — 77080 DXA BONE DENSITY AXIAL: CPT | Performed by: INTERNAL MEDICINE

## 2023-05-17 PROCEDURE — 77063 BREAST TOMOSYNTHESIS BI: CPT | Performed by: INTERNAL MEDICINE

## 2023-05-17 PROCEDURE — 77067 SCR MAMMO BI INCL CAD: CPT | Performed by: INTERNAL MEDICINE

## 2023-05-25 ENCOUNTER — ORDER TRANSCRIPTION (OUTPATIENT)
Dept: ADMINISTRATIVE | Facility: HOSPITAL | Age: 68
End: 2023-05-25

## 2023-05-25 ENCOUNTER — HOSPITAL ENCOUNTER (OUTPATIENT)
Dept: CT IMAGING | Age: 68
Discharge: HOME | End: 2023-05-25
Attending: INTERNAL MEDICINE

## 2023-05-25 VITALS
WEIGHT: 183 LBS | DIASTOLIC BLOOD PRESSURE: 78 MMHG | SYSTOLIC BLOOD PRESSURE: 120 MMHG | HEIGHT: 66 IN | BODY MASS INDEX: 29.41 KG/M2

## 2023-05-25 DIAGNOSIS — I70.0 ATHEROSCLEROSIS OF AORTA (HCC): ICD-10-CM

## 2023-05-25 DIAGNOSIS — E78.5 DYSLIPIDEMIA: ICD-10-CM

## 2023-05-25 DIAGNOSIS — Z13.9 ENCOUNTER FOR SCREENING: Primary | ICD-10-CM

## 2023-05-25 DIAGNOSIS — I10 PRIMARY HYPERTENSION: ICD-10-CM

## 2023-06-05 DIAGNOSIS — E78.5 DYSLIPIDEMIA: ICD-10-CM

## 2023-06-05 DIAGNOSIS — I70.0 ATHEROSCLEROSIS OF AORTA (HCC): ICD-10-CM

## 2023-06-05 DIAGNOSIS — R93.1 ELEVATED CORONARY ARTERY CALCIUM SCORE: Primary | ICD-10-CM

## 2023-07-07 ENCOUNTER — OFFICE VISIT (OUTPATIENT)
Dept: PODIATRY CLINIC | Facility: CLINIC | Age: 68
End: 2023-07-07

## 2023-07-07 DIAGNOSIS — M79.674 TOE PAIN, BILATERAL: ICD-10-CM

## 2023-07-07 DIAGNOSIS — I73.9 PERIPHERAL VASCULAR DISEASE (HCC): ICD-10-CM

## 2023-07-07 DIAGNOSIS — M79.675 TOE PAIN, BILATERAL: ICD-10-CM

## 2023-07-07 DIAGNOSIS — M20.42 HAMMERTOE OF LEFT FOOT: Primary | ICD-10-CM

## 2023-07-07 DIAGNOSIS — M20.41 HAMMERTOE OF RIGHT FOOT: ICD-10-CM

## 2023-07-07 DIAGNOSIS — L60.3 NAIL DYSTROPHY: ICD-10-CM

## 2023-07-07 PROCEDURE — 1160F RVW MEDS BY RX/DR IN RCRD: CPT | Performed by: STUDENT IN AN ORGANIZED HEALTH CARE EDUCATION/TRAINING PROGRAM

## 2023-07-07 PROCEDURE — 99203 OFFICE O/P NEW LOW 30 MIN: CPT | Performed by: STUDENT IN AN ORGANIZED HEALTH CARE EDUCATION/TRAINING PROGRAM

## 2023-07-07 PROCEDURE — 1159F MED LIST DOCD IN RCRD: CPT | Performed by: STUDENT IN AN ORGANIZED HEALTH CARE EDUCATION/TRAINING PROGRAM

## 2023-07-08 NOTE — PATIENT INSTRUCTIONS
Complete arterial duplex as ordered. Ambulate with supportive shoes and crest pads if they provide relief. Can consider surgical intervention if symptoms worsen or fail to improve.

## 2023-07-08 NOTE — PROGRESS NOTES
[facility], [facility] Podiatry  Progress Note    Candelaria Schuler is a 77year old female. Patient presents with:  New Patient: 3rd toe bilateral feet states they are curled under and she feels like she is walking on the tips of  them the skin is growing thick. Patient denies any pain in office. HPI:     Patient is a pleasant 68-year-old female presents to clinic for evaluation of multiple pedal complaints. She has hammertoes to both feet with the third toes being the worst.  She relates that these toes tend to curl and cause her nails become thickened and painful. She denies any pain in the office but relates that it is irritating with activity. She is wondering what treatment options are available. Past medical history, medications, and allergies were reviewed. Allergies: Penicillins and Sulfa Antibiotics   Current Outpatient Medications   Medication Sig Dispense Refill    DULOXETINE 60 MG Oral Cap DR Particles TAKE 1 CAPSULE(60 MG) BY MOUTH DAILY 30 capsule 2    METOPROLOL TARTRATE 50 MG Oral Tab TAKE 1/2 TABLET(25 MG) BY MOUTH EVERY DAY 45 tablet 0    clindamycin 150 MG Oral Cap Take 4 capsules (600 mg total) by mouth as needed. 1 HOUR BEFORE DENTAL APPT      predniSONE 20 MG Oral Tab TAKE 2 TABLETS BY MOUTH DAILY FOR 5 DAYS THEN TAKE 1 TABLET BY MOUTH DAILY FOR 5 DAYS      Mepolizumab (NUCALA) 100 MG/ML Subcutaneous Solution Auto-injector Inject 1 mL (100 mg total) into the skin every 28 days. 1 mL 10    levothyroxine 75 MCG Oral Tab Take 1 tablet (75 mcg total) by mouth before breakfast. 90 tablet 3    ROSUVASTATIN 20 MG Oral Tab TAKE 1 TABLET(20 MG) BY MOUTH EVERY NIGHT 90 tablet 1    mometasone 0.1 % External Ointment Thin layer to affected area nightly for 4 weeks, then every other night x 4 weeks, twice weekly for 4 weeks. See gynecologist for re-assessment. 45 g 3    Budesonide-Formoterol Fumarate 160-4.5 MCG/ACT Inhalation Aerosol Inhale 2 puffs into the lungs 2 (two) times daily. Albuterol Sulfate  (90 Base) MCG/ACT Inhalation Aero Soln Inhale 2 puffs into the lungs every 4 (four) hours as needed for Wheezing or Shortness of Breath. 1 Inhaler 3    Tiotropium Bromide Monohydrate (SPIRIVA RESPIMAT) 1.25 MCG/ACT Inhalation Aero Soln Inhale 2 sprays into the lungs daily. 3 Inhaler 2    aspirin 81 MG Oral Chew Tab Chew 1 tablet (81 mg total) by mouth every morning. Omega-3 Fatty Acids (OMEGA 3 OR) Take 1,000 mg by mouth 2 (two) times daily. Past Medical History:   Diagnosis Date    Acute midline low back pain without sciatica 6/14/2021    Acute postoperative pain of right shoulder 10/22/2020    Asthma     Atherosclerosis of aorta ([address] 9/22/2019    CXR - 3/14/19    Basal cell carcinoma (BCC)     Nose    BRCA1 negative 2019    BRCA2 negative 2019    Cancer screening 03/16/2022    [employer] Common Hereditary Cancers Panel = Negative for 47 genes. Cellulitis of hand, left 8/3/2016    Chondromalacia of patella     Chronic right shoulder pain 8/11/2021    Colon polyps 4/2013    scope    COVID 01/2021    recved IV treatment , NO Residual effects. ASTHMA FLAIR, COUGH FEVERS. RESOLVED    Decreased GFR 9/22/2019    GFR 58 - 10/18/18     Depression     resolved, takes Duloxetine for hot flashes     Dyslipidemia     Endometriosis     Very painful periods. Was suspected to have endometriosis. Endometriosis 06/2022    Focal endometriosis on path from hysterectomy 6/2022. At time of surgery, findings consistent with scarring from probable stage 3/4 endometriosis.      Eosinophilic asthma 21/75/1498    Essential hypertension     Fall 6/14/2021    Family history of heart disease     Ganglion of right wrist     OR by     Head injury 6/14/2021    High blood pressure     High cholesterol     HTN (hypertension)     Hyperlipidemia     Hypothyroidism 7/2014    Inguinal hernia 9010    OR    Lichen sclerosus of vulva 07/25/2022    biopsy proven    Menopausal symptoms     Menopause 2001    age 48. Nontraumatic complete tear of right rotator cuff 8/11/2020    Nontraumatic rupture of right long head biceps tendon 8/11/2020    Obesity     Obesity (BMI 30-39.9)     MELISSA (obstructive sleep apnea) 5/26/21-HST    AHI-7    Pap smear for cervical cancer screening 03/07/2022    Pap negative     Plantar fasciitis     left    Prediabetes     Right knee meniscal tear 2009    [facility] (subarachnoid hemorrhage) ([address] 6/9/2021    Serous cystadenoma of right ovary 06/24/2022    BSO performed. Pelvic washings benign    CHERELLE (stress urinary incontinence, female)     Had urogyn eval with Dr. Leda Chavis. Was told she holds her bladder a lot & that she is not a sling candidate. Tendonitis of ankle, left       Past Surgical History:   Procedure Laterality Date    ARTHROSCOPY OF JOINT UNLISTED Right     shoulder     BIOPSY VULVA/PERINEUM,ADDNL LESN Bilateral 67/81/8932    Lichen Sclerosus of vulva. Two sites on vulva biopsied. Dr. Giacomo Huddleston    COLONOSCOPY  2002    COLONOSCOPY  6/28/2013    Procedure: COLONOSCOPY;  Surgeon: Harriet Galeano MD;  Location: [facility] ENDOSCOPY    EXCIS PRIMARY GANGLION WRIST      on right by [facility] ENDOMETRIAL SAMPLING W OR WO ENDOCERV SAMPLING  03/07/2022    EMB done for borderline thickened endometrium, complex ovarian cyst. Sounded 6.5 cm. Path Insufficient. Dr. Charisse Ron    right inguinal    HERNIA SURGERY  12/03/2021    ROBOTIC ASSISTED REPAIR OF UMBILICAL HERNIA WITH MESH    HYSTERECTOMY  06/24/2022    Robot assisted total laparoscopic hysterectomy, bilateral salpingo-oophorectomy, pelvic washings, cystoscopy, lysis of adhesions, and bilateral ureterolysis. Path: Benign serous cystadenoma of right ovary. Focal endometriosis.  Dr. Giacomo Huddleston, [address] ARTHROSCOPY  2009    right knee torn meniscus    OOPHORECTOMY Bilateral 06/24/2022    right ovary - benign serous cystadenoma    OTHER  1989    ? burning/cutting of cervix after abnormal pap. Approx date. Exact procedure not known. SALPINGECTOMY Bilateral 2022    at time of hysterectomy    SKIN SURGERY  10/26/2020    BCC Left nasal ala mohs by Dr Emi Mendoza      Family History   Problem Relation Age of Onset    Heart Disease Brother     High Blood Pressure Brother     Cancer Brother 79        2 cancerous growths removed arm    Other (heart diseases) Brother     Heart Disease Brother     High Blood Pressure Brother     Cancer Brother 79        scalp cancerous growth removed    Other (skin cancer) Brother     Diabetes Father     Other (Other) Father     Breast Cancer Mother 61    Heart Disease Mother     Cancer Mother         lung    Breast Cancer 2nd occurrence Mother 72        had 3 occurrences of breast cancer    Cancer Paternal Grandfather         lung    Heart Attack Maternal Grandfather     Heart Disease Maternal Grandmother     Heart Disease Brother 52        stents    High Blood Pressure Brother     Other (Other) Son         vein surgery legs    Breast Cancer Maternal Cousin Female 50        No genetic testing.      Ovarian Cancer Neg     Colon Cancer Neg     Pancreatic Cancer Neg     Melanoma Neg     Uterine Cancer Neg       Social History    Socioeconomic History      Marital status:       Spouse name: Patience Kemp      Number of children: 2    Occupational History        Employer: [employer] 0    Tobacco Use      Smoking status: Former        Packs/day: 1.50        Years: 15.00        Pack years: 22.5        Types: Cigarettes        Start date: 1970        Quit date: 1985        Years since quittin.8      Smokeless tobacco: Former    Vaping Use      Vaping Use: Never used    Substance and Sexual Activity      Alcohol use: Yes        Comment: monthly       Drug use: No      Sexual activity: Not Currently        Partners: Male        Birth control/protection: Hysterectomy          REVIEW OF SYSTEMS:     Today reviewed systems as documented below  GENERAL HEALTH: feels well otherwise  SKIN: denies any unusual skin lesions or rashes  RESPIRATORY: denies shortness of breath with exertion  CARDIOVASCULAR: denies chest pain on exertion  GI: denies abdominal pain and denies heartburn  NEURO: denies headaches  MUSCULO: History of back pain      EXAM:   LMP  (LMP Unknown)   GENERAL: well developed, well nourished, in no apparent distress  EXTREMITIES:   1. Integument: Normal skin temperature and turgor. Third digit nails are thickened dystrophic and mildly impacted bilaterally. 2. Vascular: Unable to palpate pedal pulses bilaterally. 3. Musculoskeletal: All muscle groups are graded 5 out of 5 in the foot and ankle. Flexion contractures of digits 2 through 5 bilaterally with the third digits being most painful bilaterally. Contractures are semireducible. Compartments are soft and compressible. 4. Neurological: Normal sharp dull sensation; reflexes normal.        ASSESSMENT AND PLAN:   Diagnoses and all orders for this visit:    Hammertoe of left foot    Hammertoe of right foot    Peripheral vascular disease ([facility]  -     US ARTERIAL DUPLEX LOWER EXTREMITY BILATERAL (CPT=93925); Future    Toe pain, bilateral    Nail dystrophy        Plan:     -Patient examined, chart history reviewed. -Discussed etiology condition and various treatment options.  -Discussed with patient that her hammertoe deformities are causing impaction and rubbing at her third toenail. Crest pads dispensed to better offload sites. She can use these as needed should ambulate with supportive shoes with wide toe box.  -Surgically, discussed with patient that we could perform arthroplasty and may consider correcting toes 2 through 5. Her adductovarus rotation of her fourth and fifth toes is contributing to the third toe deformity.  -However, I have difficulty palpating pedal pulses and would want her to complete arterial duplex prior to any surgical intervention.   Patient expressed understanding and this test was ordered. Can call with results. Patient can follow-up as needed for reevaluation or possible surgical intervention once arterial duplex is complete. The patient indicates understanding of these issues and agrees to the plan. Return if symptoms worsen or fail to improve.     Ernesto De La Rosa DPM

## 2023-07-12 ENCOUNTER — HOSPITAL ENCOUNTER (OUTPATIENT)
Dept: ULTRASOUND IMAGING | Facility: HOSPITAL | Age: 68
Discharge: HOME | End: 2023-07-12
Attending: STUDENT IN AN ORGANIZED HEALTH CARE EDUCATION/TRAINING PROGRAM
Payer: MEDICARE

## 2023-07-12 DIAGNOSIS — I73.9 PERIPHERAL VASCULAR DISEASE (HCC): ICD-10-CM

## 2023-07-12 PROCEDURE — 93925 LOWER EXTREMITY STUDY: CPT | Performed by: STUDENT IN AN ORGANIZED HEALTH CARE EDUCATION/TRAINING PROGRAM

## 2023-08-02 RX ORDER — METOPROLOL TARTRATE 50 MG/1
TABLET, FILM COATED ORAL
Qty: 45 TABLET | Refills: 0 | Status: SHIPPED | OUTPATIENT
Start: 2023-08-02

## 2023-09-01 RX ORDER — DULOXETIN HYDROCHLORIDE 60 MG/1
60 CAPSULE, DELAYED RELEASE ORAL DAILY
Qty: 30 CAPSULE | Refills: 2 | Status: SHIPPED | OUTPATIENT
Start: 2023-09-01

## 2023-09-13 ENCOUNTER — OFFICE VISIT (OUTPATIENT)
Dept: NEUROLOGY | Facility: CLINIC | Age: 68
End: 2023-09-13
Payer: MEDICARE

## 2023-09-13 VITALS
WEIGHT: 192.63 LBS | SYSTOLIC BLOOD PRESSURE: 126 MMHG | DIASTOLIC BLOOD PRESSURE: 72 MMHG | BODY MASS INDEX: 31 KG/M2 | RESPIRATION RATE: 16 BRPM | HEART RATE: 61 BPM

## 2023-09-13 DIAGNOSIS — R47.89 WORD FINDING DIFFICULTY: Primary | ICD-10-CM

## 2023-09-13 DIAGNOSIS — Z87.820 HISTORY OF CLOSED HEAD INJURY: ICD-10-CM

## 2023-09-13 PROCEDURE — 3078F DIAST BP <80 MM HG: CPT | Performed by: OTHER

## 2023-09-13 PROCEDURE — 1159F MED LIST DOCD IN RCRD: CPT | Performed by: OTHER

## 2023-09-13 PROCEDURE — 99204 OFFICE O/P NEW MOD 45 MIN: CPT | Performed by: OTHER

## 2023-09-13 PROCEDURE — 3074F SYST BP LT 130 MM HG: CPT | Performed by: OTHER

## 2023-09-19 ENCOUNTER — OFFICE VISIT (OUTPATIENT)
Dept: RHEUMATOLOGY | Facility: CLINIC | Age: 68
End: 2023-09-19
Payer: MEDICARE

## 2023-09-19 VITALS
HEIGHT: 66 IN | TEMPERATURE: 97 F | WEIGHT: 190 LBS | DIASTOLIC BLOOD PRESSURE: 82 MMHG | BODY MASS INDEX: 30.53 KG/M2 | OXYGEN SATURATION: 94 % | RESPIRATION RATE: 16 BRPM | HEART RATE: 83 BPM | SYSTOLIC BLOOD PRESSURE: 134 MMHG

## 2023-09-19 DIAGNOSIS — M19.042 PRIMARY OSTEOARTHRITIS OF BOTH HANDS: ICD-10-CM

## 2023-09-19 DIAGNOSIS — J82.83 EOSINOPHILIC ASTHMA: ICD-10-CM

## 2023-09-19 DIAGNOSIS — M19.041 PRIMARY OSTEOARTHRITIS OF BOTH HANDS: ICD-10-CM

## 2023-09-19 DIAGNOSIS — M25.542 ARTHRALGIA OF BOTH HANDS: Primary | ICD-10-CM

## 2023-09-19 DIAGNOSIS — Z82.69 FAMILY HISTORY OF GOUT: ICD-10-CM

## 2023-09-19 DIAGNOSIS — E79.0 HYPERURICEMIA: ICD-10-CM

## 2023-09-19 DIAGNOSIS — M25.541 ARTHRALGIA OF BOTH HANDS: Primary | ICD-10-CM

## 2023-09-19 PROCEDURE — 99204 OFFICE O/P NEW MOD 45 MIN: CPT | Performed by: INTERNAL MEDICINE

## 2023-09-19 PROCEDURE — 1159F MED LIST DOCD IN RCRD: CPT | Performed by: INTERNAL MEDICINE

## 2023-09-19 PROCEDURE — 1160F RVW MEDS BY RX/DR IN RCRD: CPT | Performed by: INTERNAL MEDICINE

## 2023-09-19 PROCEDURE — 3075F SYST BP GE 130 - 139MM HG: CPT | Performed by: INTERNAL MEDICINE

## 2023-09-19 PROCEDURE — 3008F BODY MASS INDEX DOCD: CPT | Performed by: INTERNAL MEDICINE

## 2023-09-19 PROCEDURE — 3079F DIAST BP 80-89 MM HG: CPT | Performed by: INTERNAL MEDICINE

## 2023-09-19 NOTE — PATIENT INSTRUCTIONS
-Get hand X-rays   -get uric acid    -Erosive hand Osteoarthritis  ---Acetaminophen (Tylenol):  Take two 650 mg extended release (arthritis) capsules in the morning (around 8 AM) and in the early afternoon (around 4 PM) as needed  --voltaren gel    -Gout    -less likely: psoriatic arthritis

## 2023-09-28 ENCOUNTER — HOSPITAL ENCOUNTER (OUTPATIENT)
Dept: CT IMAGING | Facility: HOSPITAL | Age: 68
Discharge: HOME | End: 2023-09-28
Attending: INTERNAL MEDICINE
Payer: MEDICARE

## 2023-09-28 ENCOUNTER — HOSPITAL ENCOUNTER (OUTPATIENT)
Dept: GENERAL RADIOLOGY | Facility: HOSPITAL | Age: 68
Discharge: HOME | End: 2023-09-28
Attending: INTERNAL MEDICINE
Payer: MEDICARE

## 2023-09-28 ENCOUNTER — HOSPITAL ENCOUNTER (OUTPATIENT)
Dept: MRI IMAGING | Facility: HOSPITAL | Age: 68
Discharge: HOME | End: 2023-09-28
Attending: Other
Payer: MEDICARE

## 2023-09-28 DIAGNOSIS — M19.041 PRIMARY OSTEOARTHRITIS OF BOTH HANDS: ICD-10-CM

## 2023-09-28 DIAGNOSIS — E79.0 HYPERURICEMIA: ICD-10-CM

## 2023-09-28 DIAGNOSIS — R47.89 WORD FINDING DIFFICULTY: ICD-10-CM

## 2023-09-28 DIAGNOSIS — M19.042 PRIMARY OSTEOARTHRITIS OF BOTH HANDS: ICD-10-CM

## 2023-09-28 DIAGNOSIS — R91.8 PULMONARY NODULES: ICD-10-CM

## 2023-09-28 DIAGNOSIS — J82.83 EOSINOPHILIC ASTHMA: ICD-10-CM

## 2023-09-28 PROCEDURE — 73130 X-RAY EXAM OF HAND: CPT | Performed by: INTERNAL MEDICINE

## 2023-09-28 PROCEDURE — 71250 CT THORAX DX C-: CPT | Performed by: INTERNAL MEDICINE

## 2023-09-28 PROCEDURE — 70551 MRI BRAIN STEM W/O DYE: CPT | Performed by: OTHER

## 2023-10-02 DIAGNOSIS — R91.8 PULMONARY NODULES: Primary | ICD-10-CM

## 2023-10-08 ENCOUNTER — LAB ENCOUNTER (OUTPATIENT)
Dept: LAB | Facility: HOSPITAL | Age: 68
End: 2023-10-08
Attending: INTERNAL MEDICINE
Payer: MEDICARE

## 2023-10-08 DIAGNOSIS — J82.83 EOSINOPHILIC ASTHMA: ICD-10-CM

## 2023-10-08 DIAGNOSIS — E79.0 HYPERURICEMIA: ICD-10-CM

## 2023-10-08 DIAGNOSIS — M19.042 PRIMARY OSTEOARTHRITIS OF BOTH HANDS: ICD-10-CM

## 2023-10-08 DIAGNOSIS — M19.041 PRIMARY OSTEOARTHRITIS OF BOTH HANDS: ICD-10-CM

## 2023-10-08 DIAGNOSIS — R73.9 BLOOD GLUCOSE ELEVATED: ICD-10-CM

## 2023-10-08 LAB
EST. AVERAGE GLUCOSE BLD GHB EST-MCNC: 117 MG/DL (ref 68–126)
HBA1C MFR BLD: 5.7 % (ref ?–5.7)
URATE SERPL-MCNC: 5.4 MG/DL

## 2023-10-08 PROCEDURE — 36415 COLL VENOUS BLD VENIPUNCTURE: CPT

## 2023-10-08 PROCEDURE — 84550 ASSAY OF BLOOD/URIC ACID: CPT

## 2023-10-08 PROCEDURE — 83036 HEMOGLOBIN GLYCOSYLATED A1C: CPT

## 2023-10-11 ENCOUNTER — HOSPITAL ENCOUNTER (OUTPATIENT)
Dept: CT IMAGING | Facility: HOSPITAL | Age: 68
End: 2023-10-11
Attending: INTERNAL MEDICINE
Payer: MEDICARE

## 2023-10-11 ENCOUNTER — HOSPITAL ENCOUNTER (OUTPATIENT)
Dept: CT IMAGING | Facility: HOSPITAL | Age: 68
Discharge: HOME | End: 2023-10-11
Attending: INTERNAL MEDICINE
Payer: MEDICARE

## 2023-10-11 DIAGNOSIS — M1A.0310 IDIOPATHIC CHRONIC GOUT OF RIGHT WRIST WITHOUT TOPHUS: ICD-10-CM

## 2023-10-11 DIAGNOSIS — M19.031 ARTHRITIS OF WRIST, RIGHT: ICD-10-CM

## 2023-10-11 PROCEDURE — 73200 CT UPPER EXTREMITY W/O DYE: CPT | Performed by: INTERNAL MEDICINE

## 2023-10-17 ENCOUNTER — APPOINTMENT (OUTPATIENT)
Dept: SPEECH THERAPY | Facility: HOSPITAL | Age: 68
End: 2023-10-17
Attending: Other
Payer: MEDICARE

## 2023-10-22 DIAGNOSIS — E78.5 DYSLIPIDEMIA: ICD-10-CM

## 2023-10-24 ENCOUNTER — APPOINTMENT (OUTPATIENT)
Dept: SPEECH THERAPY | Facility: HOSPITAL | Age: 68
End: 2023-10-24
Attending: Other
Payer: MEDICARE

## 2023-10-24 RX ORDER — ROSUVASTATIN CALCIUM 20 MG/1
TABLET, COATED ORAL
Qty: 90 TABLET | Refills: 1 | Status: SHIPPED | OUTPATIENT
Start: 2023-10-24

## 2023-10-24 NOTE — TELEPHONE ENCOUNTER
Cholesterol Medication Protocol [id number]/22/2023 11:06 AM   Protocol Details ALT < 80    ALT resulted within past year    Lipid panel within past 12 months    Appointment within past 12 or next 3 months     Last annual 4/25/23 with TB

## 2023-10-31 ENCOUNTER — OFFICE VISIT (OUTPATIENT)
Dept: RHEUMATOLOGY | Facility: CLINIC | Age: 68
End: 2023-10-31

## 2023-10-31 VITALS
TEMPERATURE: 98 F | OXYGEN SATURATION: 95 % | WEIGHT: 184.38 LBS | HEIGHT: 66 IN | SYSTOLIC BLOOD PRESSURE: 110 MMHG | HEART RATE: 70 BPM | RESPIRATION RATE: 16 BRPM | DIASTOLIC BLOOD PRESSURE: 68 MMHG | BODY MASS INDEX: 29.63 KG/M2

## 2023-10-31 DIAGNOSIS — M1A.00X1 IDIOPATHIC CHRONIC GOUT WITH TOPHUS, UNSPECIFIED SITE: Primary | ICD-10-CM

## 2023-10-31 DIAGNOSIS — Z51.81 THERAPEUTIC DRUG MONITORING: ICD-10-CM

## 2023-10-31 PROCEDURE — 3008F BODY MASS INDEX DOCD: CPT | Performed by: INTERNAL MEDICINE

## 2023-10-31 PROCEDURE — 3078F DIAST BP <80 MM HG: CPT | Performed by: INTERNAL MEDICINE

## 2023-10-31 PROCEDURE — 3074F SYST BP LT 130 MM HG: CPT | Performed by: INTERNAL MEDICINE

## 2023-10-31 PROCEDURE — 99214 OFFICE O/P EST MOD 30 MIN: CPT | Performed by: INTERNAL MEDICINE

## 2023-10-31 PROCEDURE — 1159F MED LIST DOCD IN RCRD: CPT | Performed by: INTERNAL MEDICINE

## 2023-10-31 PROCEDURE — 1160F RVW MEDS BY RX/DR IN RCRD: CPT | Performed by: INTERNAL MEDICINE

## 2023-10-31 RX ORDER — MEPOLIZUMAB 100 MG/ML
INJECTION, SOLUTION SUBCUTANEOUS
COMMUNITY
Start: 2023-10-24

## 2023-10-31 RX ORDER — COLCHICINE 0.6 MG/1
0.6 TABLET ORAL DAILY
Qty: 90 TABLET | Refills: 1 | Status: SHIPPED | OUTPATIENT
Start: 2023-10-31

## 2023-10-31 RX ORDER — METOPROLOL TARTRATE 50 MG/1
TABLET, FILM COATED ORAL
Qty: 45 TABLET | Refills: 0 | Status: SHIPPED | OUTPATIENT
Start: 2023-10-31

## 2023-10-31 RX ORDER — ALLOPURINOL 100 MG/1
TABLET ORAL
Qty: 150 TABLET | Refills: 1 | Status: SHIPPED | OUTPATIENT
Start: 2023-10-31 | End: 2024-01-29

## 2023-10-31 RX ORDER — PREDNISONE 5 MG/1
TABLET ORAL
Qty: 32 TABLET | Refills: 0 | Status: SHIPPED | OUTPATIENT
Start: 2023-10-31 | End: 2023-11-10

## 2023-10-31 RX ORDER — COLCHICINE 0.6 MG/1
0.6 CAPSULE ORAL DAILY
Qty: 90 CAPSULE | Refills: 1 | Status: SHIPPED | OUTPATIENT
Start: 2023-10-31 | End: 2023-11-30

## 2023-10-31 NOTE — PATIENT INSTRUCTIONS
Look into colchicine and prevention of heart attacks. Allopurinol uptitration:  1 tablet (100 mg total) daily for 45 days  THEN 2 tablets (200 mg total) daily for 45 days    Repeat blood work in 3 months    Colchicine (to prevent gout flares while starting on allopurinol). Generally, you will need to be on colchicine for 6-12 months, when the risk of gout flares is highest during allopurinol initiation. Colchicine 0.6 mg tab: take 1 pill daily  -if colchicine is expensive, let me know. Alternative pharmacy is:  [facility]. Phone: [phone]  [email or web address]      Prednisone taper given (only as needed for gout flares):    6 tabs (30 mg) daily for 2 days  THEN 4 tabs (20 mg) daily for 2 days,   THEN 3 tabs (15 mg) daily for 2 day  THEN 2 tabs (10 mg ) daily for 2 days  THEN 1 tablet (5 mg total) daily for 2 days.

## 2023-11-20 ENCOUNTER — OFFICE VISIT (OUTPATIENT)
Dept: INTERNAL MEDICINE CLINIC | Facility: CLINIC | Age: 68
End: 2023-11-20
Payer: MEDICARE

## 2023-11-20 VITALS
HEART RATE: 88 BPM | TEMPERATURE: 98 F | SYSTOLIC BLOOD PRESSURE: 132 MMHG | BODY MASS INDEX: 30.35 KG/M2 | RESPIRATION RATE: 18 BRPM | WEIGHT: 184.38 LBS | DIASTOLIC BLOOD PRESSURE: 76 MMHG | HEIGHT: 65.35 IN

## 2023-11-20 DIAGNOSIS — E74.39 GLUCOSE INTOLERANCE: ICD-10-CM

## 2023-11-20 DIAGNOSIS — J82.83 EOSINOPHILIC ASTHMA: Primary | ICD-10-CM

## 2023-11-20 DIAGNOSIS — R06.2 WHEEZING: ICD-10-CM

## 2023-11-20 DIAGNOSIS — I10 ESSENTIAL HYPERTENSION: ICD-10-CM

## 2023-11-20 PROCEDURE — 1160F RVW MEDS BY RX/DR IN RCRD: CPT | Performed by: INTERNAL MEDICINE

## 2023-11-20 PROCEDURE — 1126F AMNT PAIN NOTED NONE PRSNT: CPT | Performed by: INTERNAL MEDICINE

## 2023-11-20 PROCEDURE — 1159F MED LIST DOCD IN RCRD: CPT | Performed by: INTERNAL MEDICINE

## 2023-11-20 PROCEDURE — 3078F DIAST BP <80 MM HG: CPT | Performed by: INTERNAL MEDICINE

## 2023-11-20 PROCEDURE — 1170F FXNL STATUS ASSESSED: CPT | Performed by: INTERNAL MEDICINE

## 2023-11-20 PROCEDURE — 99214 OFFICE O/P EST MOD 30 MIN: CPT | Performed by: INTERNAL MEDICINE

## 2023-11-20 PROCEDURE — 3008F BODY MASS INDEX DOCD: CPT | Performed by: INTERNAL MEDICINE

## 2023-11-20 PROCEDURE — 3075F SYST BP GE 130 - 139MM HG: CPT | Performed by: INTERNAL MEDICINE

## 2023-11-20 RX ORDER — METHYLPREDNISOLONE 4 MG/1
TABLET ORAL
Qty: 1 EACH | Refills: 0 | Status: SHIPPED | OUTPATIENT
Start: 2023-11-20

## 2023-12-30 DIAGNOSIS — R23.2 HOT FLASHES: Primary | ICD-10-CM

## 2024-01-02 RX ORDER — DULOXETIN HYDROCHLORIDE 60 MG/1
60 CAPSULE, DELAYED RELEASE ORAL DAILY
Qty: 30 CAPSULE | Refills: 2 | Status: SHIPPED | OUTPATIENT
Start: 2024-01-02

## 2024-01-30 RX ORDER — METOPROLOL TARTRATE 50 MG/1
25 TABLET, FILM COATED ORAL DAILY
Qty: 45 TABLET | Refills: 0 | Status: SHIPPED | OUTPATIENT
Start: 2024-01-30

## 2024-02-16 DIAGNOSIS — Z51.81 THERAPEUTIC DRUG MONITORING: ICD-10-CM

## 2024-02-16 DIAGNOSIS — M1A.00X1 IDIOPATHIC CHRONIC GOUT WITH TOPHUS, UNSPECIFIED SITE: ICD-10-CM

## 2024-02-16 RX ORDER — COLCHICINE 0.6 MG/1
0.6 TABLET ORAL DAILY
Qty: 90 TABLET | Refills: 0 | Status: SHIPPED | OUTPATIENT
Start: 2024-02-16

## 2024-02-16 NOTE — TELEPHONE ENCOUNTER
LOV:   10/31/2023       Future Appointments   Date Time Provider Department Center   3/8/2024 10:00 AM Thad Omalley MD [id number]       LF:   10/31/2023    QTY:   90    Refills:   1    LABS:     Uric Acid  Order: [id number]  Collected 10/8/2023  9:09 AM       Status: Final result       Dx: Eosinophilic asthma; Hyperuricemia; P...    2 Result Notes      Component  Ref Range & Units 10/8/23  9:09 AM   Uric Acid  2.6 - 6.0 mg/dL 5.4   Resulting [facility] ([facility])              Specimen Collected: 10/08/23  9:09 AM Last Resulted: 10/08/23  9:57 AM

## 2024-03-06 ENCOUNTER — LAB ENCOUNTER (OUTPATIENT)
Dept: LAB | Age: 69
End: 2024-03-06
Attending: INTERNAL MEDICINE
Payer: MEDICARE

## 2024-03-06 DIAGNOSIS — M1A.00X1 IDIOPATHIC CHRONIC GOUT WITH TOPHUS, UNSPECIFIED SITE: ICD-10-CM

## 2024-03-06 DIAGNOSIS — Z51.81 THERAPEUTIC DRUG MONITORING: ICD-10-CM

## 2024-03-06 LAB
ALBUMIN SERPL-MCNC: 3.7 G/DL (ref 3.4–5)
ALBUMIN/GLOB SERPL: 1.2 {RATIO} (ref 1–2)
ALP LIVER SERPL-CCNC: 93 U/L
ALT SERPL-CCNC: 31 U/L
ANION GAP SERPL CALC-SCNC: 0 MMOL/L (ref 0–18)
AST SERPL-CCNC: 27 U/L (ref 15–37)
BILIRUB SERPL-MCNC: 0.5 MG/DL (ref 0.1–2)
BUN BLD-MCNC: 19 MG/DL (ref 9–23)
CALCIUM BLD-MCNC: 9.2 MG/DL (ref 8.5–10.1)
CHLORIDE SERPL-SCNC: 110 MMOL/L (ref 98–112)
CO2 SERPL-SCNC: 31 MMOL/L (ref 21–32)
CREAT BLD-MCNC: 0.89 MG/DL
EGFRCR SERPLBLD CKD-EPI 2021: 69 ML/MIN/1.73M2 (ref 60–?)
FASTING STATUS PATIENT QL REPORTED: YES
GLOBULIN PLAS-MCNC: 3.1 G/DL (ref 2.8–4.4)
GLUCOSE BLD-MCNC: 89 MG/DL (ref 70–99)
OSMOLALITY SERPL CALC.SUM OF ELEC: 294 MOSM/KG (ref 275–295)
POTASSIUM SERPL-SCNC: 4.9 MMOL/L (ref 3.5–5.1)
PROT SERPL-MCNC: 6.8 G/DL (ref 6.4–8.2)
SODIUM SERPL-SCNC: 141 MMOL/L (ref 136–145)
URATE SERPL-MCNC: 4 MG/DL

## 2024-03-06 PROCEDURE — 80053 COMPREHEN METABOLIC PANEL: CPT

## 2024-03-06 PROCEDURE — 82550 ASSAY OF CK (CPK): CPT

## 2024-03-06 PROCEDURE — 36415 COLL VENOUS BLD VENIPUNCTURE: CPT

## 2024-03-06 PROCEDURE — 84550 ASSAY OF BLOOD/URIC ACID: CPT

## 2024-03-08 ENCOUNTER — OFFICE VISIT (OUTPATIENT)
Dept: RHEUMATOLOGY | Facility: CLINIC | Age: 69
End: 2024-03-08
Payer: MEDICARE

## 2024-03-08 VITALS
SYSTOLIC BLOOD PRESSURE: 124 MMHG | HEART RATE: 58 BPM | OXYGEN SATURATION: 97 % | BODY MASS INDEX: 29.89 KG/M2 | HEIGHT: 66 IN | TEMPERATURE: 97 F | DIASTOLIC BLOOD PRESSURE: 70 MMHG | WEIGHT: 186 LBS | RESPIRATION RATE: 16 BRPM

## 2024-03-08 DIAGNOSIS — Z51.81 THERAPEUTIC DRUG MONITORING: ICD-10-CM

## 2024-03-08 DIAGNOSIS — M1A.00X1 IDIOPATHIC CHRONIC GOUT WITH TOPHUS, UNSPECIFIED SITE: Primary | ICD-10-CM

## 2024-03-08 DIAGNOSIS — M19.041 PRIMARY OSTEOARTHRITIS OF BOTH HANDS: ICD-10-CM

## 2024-03-08 DIAGNOSIS — J82.83 EOSINOPHILIC ASTHMA (HCC): ICD-10-CM

## 2024-03-08 DIAGNOSIS — M19.042 PRIMARY OSTEOARTHRITIS OF BOTH HANDS: ICD-10-CM

## 2024-03-08 LAB — CK SERPL-CCNC: 74 U/L

## 2024-03-08 PROCEDURE — 99214 OFFICE O/P EST MOD 30 MIN: CPT | Performed by: INTERNAL MEDICINE

## 2024-03-08 RX ORDER — ALLOPURINOL 100 MG/1
TABLET ORAL
COMMUNITY
Start: 2024-02-03 | End: 2024-03-08

## 2024-03-08 RX ORDER — ALLOPURINOL 300 MG/1
TABLET ORAL
Qty: 90 TABLET | Refills: 3 | Status: SHIPPED | OUTPATIENT
Start: 2024-03-08

## 2024-03-08 NOTE — PATIENT INSTRUCTIONS
Allopurinol   increase to 300 mg daily     Repeat blood work in 6 months    Colchicine (to prevent gout flares while starting on allopurinol). Generally, you will need to be on colchicine for 6-12 months, when the risk of gout flares is highest during allopurinol initiation.  Colchicine 0.6 mg tab: take 1 pill daily  -if colchicine is expensive, let me know. Alternative pharmacy is:  [facility]. Phone: [phone]  [email or web address]      Prednisone taper given (only as needed for gout flares):    6 tabs (30 mg) daily for 2 days  THEN 4 tabs (20 mg) daily for 2 days,   THEN 3 tabs (15 mg) daily for 2 day  THEN 2 tabs (10 mg ) daily for 2 days  THEN 1 tablet (5 mg total) daily for 2 days.

## 2024-03-08 NOTE — PROGRESS NOTES
Rheumatology f/u Patient Note  =====================================================================================================  Chief complaint: gout  Chief Complaint   Patient presents with    Gout     4 month f/u Feeling good. No gout flares. Pain when overworking hands. No other joint pain. No new symptoms. Converted rapid score of 1.7     PCP  Kristal Valdez MD  Fax: [phone]  Phone: [phone]  =====================================================================================================  HPI  Sarah Mejia is a 69 year old female   ==============================================================================================================  Visit: 10/31/23  Here for follow-up for new diagnosis of gouty arthropathy.  Right hand x-ray concerning for gout.  Right wrist dual-energy CT confirmed uric acid deposition in right wrist and tendons of the extensor tendons of the fingers.  -Brothers with recurrent renal stones.  Treated with allopurinol  -Father with gout  -Continues on Nucala for severe eosinophilic asthma  -Denies any hx of overt gout flares today.  ==============================================================================================================  Today's Visit: 03/08/24    Doing very well on allopurinol 200 mg daily and colchicine 0.6 mg daily.  Tolerating medications well.  No recent gout flares.  DIP nodule softening.  -Brothers with recurrent renal stones.  Treated with allopurinol  -Father with gout, grandfather with severe gout patient found out  -Asthma is flaring up recently    5 point ROS negative except noted above  I had reviewed past medical and family histories together with allergy and medication lists documented.      Medications:  Current Outpatient Medications on File Prior to Visit   Medication Sig Dispense Refill    colchicine 0.6 MG Oral Tab Take 1 tablet (0.6 mg total) by mouth daily. 90 tablet 0    metoprolol tartrate 50 MG Oral Tab Take 0.5  tablets (25 mg total) by mouth daily. 45 tablet 0    DULoxetine 60 MG Oral Cap DR Particles Take 1 capsule (60 mg total) by mouth daily. 30 capsule 2    NUCALA 100 MG/ML Subcutaneous Solution Auto-injector       ROSUVASTATIN 20 MG Oral Tab TAKE 1 TABLET(20 MG) BY MOUTH EVERY NIGHT 90 tablet 1    clindamycin 150 MG Oral Cap Take 4 capsules (600 mg total) by mouth as needed. 1 HOUR BEFORE DENTAL APPT      levothyroxine 75 MCG Oral Tab Take 1 tablet (75 mcg total) by mouth before breakfast. 90 tablet 3    mometasone 0.1 % External Ointment Thin layer to affected area nightly for 4 weeks, then every other night x 4 weeks, twice weekly for 4 weeks. See gynecologist for re-assessment. 45 g 3    Budesonide-Formoterol Fumarate 160-4.5 MCG/ACT Inhalation Aerosol Inhale 2 puffs into the lungs 2 (two) times daily.      Albuterol Sulfate  (90 Base) MCG/ACT Inhalation Aero Soln Inhale 2 puffs into the lungs every 4 (four) hours as needed for Wheezing or Shortness of Breath. 1 Inhaler 3    Tiotropium Bromide Monohydrate (SPIRIVA RESPIMAT) 1.25 MCG/ACT Inhalation Aero Soln Inhale 2 sprays into the lungs daily. 3 Inhaler 2    aspirin 81 MG Oral Chew Tab Chew 1 tablet (81 mg total) by mouth every morning.      Omega-3 Fatty Acids (OMEGA 3 OR) Take 1,000 mg by mouth 2 (two) times daily.         No current facility-administered medications on file prior to visit.     ?  Allergies:  Allergies   Allergen Reactions    Penicillins HIVES     swelling    Sulfa Antibiotics SWELLING         Objective    Vitals:    03/08/24 0959   BP: 124/70   Pulse: 58   Resp: 16   Temp: 96.8 °F (36 °C)   SpO2: 97%   Weight: 186 lb (84.4 kg)   Height: 5' 6\" (1.676 m)     GEN: NAD, well-nourished.   HEENT: Head: NCAT. Face: No lesions. Eyes: Conjunctiva clear. Sclera are anicteric. PERRLA. EOMs are full.   PULM:  easy effort  Extremities: No cyanosis, edema or deformities.   Neurologic: Strength, CN2-12 grossly intact   Psych: normal affect.   Skin: No  lesions or rashes.  MSK: 28 joint count performed. No evidence of synovitis in mcp, pip, dip, wrist, elbows, shoulders, hips, knees, ankles, mtp unless otherwise noted. Full ROM of elbows, wrists, knees.     Hand OA changes particularly of the DIPs which have prominent synovial cysts.   Joints particularly affected include right DIP 2, left DIP3, left DIP 5  -No significant swelling, only noted slight swelling of the right DIP 2  -Hoang's nodes noted  -These are all softening.  No tenderness.      Labs:    3/2023  Creatinine 0.97, rest of CMP WNL  Ferritin 106  Percent sat 24  CBC W differential WNL  TSH WNL     Lab Results   Component Value Date    URIC 4.0 03/06/2024    URIC 5.4 10/08/2023       Lab Results   Component Value Date    WBC 9.3 03/08/2023    RBC 5.25 03/08/2023    HGB 14.8 03/08/2023    HCT 47.6 03/08/2023    .0 03/08/2023    MCV 90.7 03/08/2023    MCH 28.2 03/08/2023    MCHC 31.1 03/08/2023    RDW 13.1 03/08/2023    NEPRELIM 5.89 03/08/2023    NEPERCENT 63.3 03/08/2023    LYPERCENT 25.5 03/08/2023    MOPERCENT 9.8 03/08/2023    EOPERCENT 0.5 03/08/2023    BAPERCENT 0.5 03/08/2023    NE 5.89 03/08/2023    LYMABS 2.38 03/08/2023    MOABSO 0.91 03/08/2023    EOABSO 0.05 03/08/2023    BAABSO 0.05 03/08/2023     Lab Results   Component Value Date    GLU 89 03/06/2024    BUN 19 03/06/2024    BUNCREA 22.2 (H) 06/09/2021    CREATSERUM 0.89 03/06/2024    ANIONGAP 0 03/06/2024    GFR 74 11/13/2017    GFRNAA 61 06/25/2022    GFRAA 71 06/25/2022    CA 9.2 03/06/2024    OSMOCALC 294 03/06/2024    ALKPHO 93 03/06/2024    AST 27 03/06/2024    ALT 31 03/06/2024    BILT 0.5 03/06/2024    TP 6.8 03/06/2024    ALB 3.7 03/06/2024    GLOBULIN 3.1 03/06/2024    AGRATIO 1.7 02/05/2016     03/06/2024    K 4.9 03/06/2024     03/06/2024    CO2 31.0 03/06/2024         No results found for: \"ANATI\", \"PETROS\", \"ANAS\", \"ANASCRN\", \"ANASCRNRFLX\", \"CHARIS\"  No results found for: \"SSA\", \"SSAUR\", \"ANTISSA\", \"SSA52\",  \"SSA60\", \"SSADD\", \"SSB\", \"ANTISSB\"  No results found for: \"DSDNA\", \"ANTIDSDNA\", \"SMUD\", \"ANTISM\", \"SM\", \"RNP\", \"ANTIRNP\", \"SMITHRNP\"  No results found for: \"SCL70\", \"SCL\", \"ZOHCAQG01\"  No results found for: \"C3\", \"C4\"  No results found for: \"DRVVT\", \"LAINT\", \"PTTLUPUS\", \"LUPUSINTERP\", \"LA\", \"J6ZV0JIEKG\", \"H3WV1FPFUH\", \"V3SMPUGHWU\", \"J7QGRIJIBP\"  No results found for: \"CARDIOLIPIGG\", \"CARDIOLIPIGM\", \"CARDIOLIPIGA\", \"CARDIOIGA\", \"CARLIP\"      Additional Labs:    Radiology:  CT WRIST RIGHT (MKO=[id number])    Result Date: 10/11/2023  CONCLUSION:  1. Severe osteoarthritis along the radial aspect of the wrist and thumb as described above. 2. Dual energy technique suggest soft tissue deposition of monosodium urate crystals along the flexor and extensor tendon sheaths of the distal forearm and wrist as noted above.  No focal or obvious gouty tophi are identified on soft tissue CT images.   LOCATION:  [facility]   Dictated by ([facility]): Kiara Burt DO on 10/11/2023 at 1:59 PM     Finalized by (CST): Kiara Burt DO on 10/11/2023 at 4:24 PM       XR HAND BILAT (MIN 3 VIEWS) (CPT=73130-50)    Result Date: 9/28/2023  CONCLUSION:  Osteoarthritic changes as described above bilaterally.   LOCATION:  [facility]   Dictated by (CST): Ravi Perez MD on 9/28/2023 at 8:48 AM     Finalized by (CST): Ravi Perez MD on 9/28/2023 at 8:57 AM         Radiology review:      =====================================================================================================  Assessment and Plan    Assessment:  1. Idiopathic chronic gout with tophus, unspecified site    2. Therapeutic drug monitoring    3. Primary osteoarthritis of both hands    4. Eosinophilic asthma (HCC)        #Erosive, tophaceous gouty arthropathy: Particularly of the hands and wrists.  Dual-energy CT proven.  Erosive changes of the right scaphoid appreciated.  -Father with gout  -No previously appreciated gout flares.  -Hand DIP tophi improving and are softening.  Patient with  increased function of her hands with urate lowering therapy.    #severe eosinophilic asthma on Nucala.  No signs or symptoms consistent with eosinophilic granulomatosis with polyangiitis.  ?  Plan:  Allopurinol   increase to 300 mg daily.  Since significant gout developed at serum urate of 5.4, will target mid to low 3.0 for uric acid range.    Repeat blood work in 6 months    Colchicine (to prevent gout flares while starting on allopurinol). Generally, you will need to be on colchicine for 6-12 months, when the risk of gout flares is highest during allopurinol initiation.  Colchicine 0.6 mg tab: take 1 pill daily  -if colchicine is expensive, let me know. Alternative pharmacy is:  [facility]. Phone: [phone]  [email or web address]      Prednisone taper given (only as needed for gout flares):  -Okay to take for this current asthma flareup if needed  6 tabs (30 mg) daily for 2 days  THEN 4 tabs (20 mg) daily for 2 days,   THEN 3 tabs (15 mg) daily for 2 day  THEN 2 tabs (10 mg ) daily for 2 days  THEN 1 tablet (5 mg total) daily for 2 days.    -rtc 7 months.  Repeat CMP and uric acid in 6 months. CK while on colchicine.       Diagnoses and all orders for this visit:    Idiopathic chronic gout with tophus, unspecified site  -     allopurinol 300 MG Oral Tab; Take 1 pill daily  -     Comp Metabolic Panel (14); Future  -     Uric Acid; Future  -     CK (Creatine Kinase) (Not Creatinine); Future  -     CK (Creatine Kinase) (Not Creatinine); Future    Therapeutic drug monitoring    Primary osteoarthritis of both hands    Eosinophilic asthma (HCC)          Return in about 7 months (around 10/8/2024).      The above plan of care, diagnosis, orders, and follow-up were discussed with the patient. Questions related to this recommended plan of care were answered.    Thank you for referring this delightful patient to me. Please feel free to contact me with any questions.     This report was performed  utilizing speech recognition software technology. Despite proofreading, speech recognition errors could escape detection. If a word or phrase is confusing or out of context, please do not hesitate to call for   clarification.       Kind regards      Thad Omalley MD  [facility]

## 2024-03-28 ENCOUNTER — OFFICE VISIT (OUTPATIENT)
Dept: INTERNAL MEDICINE CLINIC | Facility: CLINIC | Age: 69
End: 2024-03-28
Payer: MEDICARE

## 2024-03-28 VITALS
TEMPERATURE: 97 F | HEIGHT: 66.14 IN | WEIGHT: 185 LBS | HEART RATE: 72 BPM | DIASTOLIC BLOOD PRESSURE: 76 MMHG | RESPIRATION RATE: 18 BRPM | BODY MASS INDEX: 29.73 KG/M2 | SYSTOLIC BLOOD PRESSURE: 136 MMHG

## 2024-03-28 DIAGNOSIS — E03.8 OTHER SPECIFIED HYPOTHYROIDISM: ICD-10-CM

## 2024-03-28 DIAGNOSIS — G89.29 CHRONIC JOINT PAIN: Primary | ICD-10-CM

## 2024-03-28 DIAGNOSIS — J82.83 EOSINOPHILIC ASTHMA (HCC): ICD-10-CM

## 2024-03-28 DIAGNOSIS — R23.2 HOT FLASHES: ICD-10-CM

## 2024-03-28 DIAGNOSIS — E78.5 DYSLIPIDEMIA: ICD-10-CM

## 2024-03-28 DIAGNOSIS — M25.50 CHRONIC JOINT PAIN: Primary | ICD-10-CM

## 2024-03-28 DIAGNOSIS — I10 ESSENTIAL HYPERTENSION: ICD-10-CM

## 2024-03-28 DIAGNOSIS — E74.39 GLUCOSE INTOLERANCE: ICD-10-CM

## 2024-03-28 PROCEDURE — G2211 COMPLEX E/M VISIT ADD ON: HCPCS | Performed by: INTERNAL MEDICINE

## 2024-03-28 PROCEDURE — 99214 OFFICE O/P EST MOD 30 MIN: CPT | Performed by: INTERNAL MEDICINE

## 2024-03-28 RX ORDER — DULOXETIN HYDROCHLORIDE 60 MG/1
120 CAPSULE, DELAYED RELEASE ORAL DAILY
Qty: 60 CAPSULE | Refills: 2 | Status: SHIPPED | OUTPATIENT
Start: 2024-03-28

## 2024-03-28 NOTE — PROGRESS NOTES
Sarah Mejia is a 69 year old female.    Chief Complaint   Patient presents with    Follow - Up     ES rm - 4 - f/u for chronic issues, pain in hand sometimes 10/10       HPI:     Pleasant patient with HTN, dyslipidemia, hypothyroidism, gouty arthropathy of hands, eosinophilic asthma, pre dm here for follow up. She has stress at home with her  needing multiple eye surgeries and her son in law passed from esophageal cancer. Her joint pains come and go, she especially feels it in her hand when driving. Pain not for long but it can be 10/10 when it comes. For the gout, she is taking allopurinol and colchicine thru Dr. Omalley. Currently on duloxetine 60mg for hotflashes, discussed we can increase dose to see if it helps with chronic pain. She is open to trying it. Her BP is well controlled and last lipids in March were favorable, compliant with all medications. No CP/palp. Her asthma is ok unless she goes outside. She saw her pulmonologist in Dec and advised to stay indoors as much as possible. She is on Nucala, symbicort and albuterol prn.      Patient Active Problem List   Diagnosis    Essential hypertension    Dyslipidemia    Hypothyroidism    Family history of heart disease    Personal history of colonic polyps    Atherosclerosis of aorta (HCC)    Eosinophilic asthma (HCC)    Head injury    Family history of breast cancer in mother    Chronic vulvitis    History of subarachnoid hemorrhage    Hot flashes    Status post laparoscopic hysterectomy    S/P bilateral salpingo-oophorectomy    Pelvic adhesions    Lichen sclerosus of vulva    Onychomycosis    Primary osteoarthritis of both hands    Blood glucose elevated    Speech disturbance    Glucose intolerance    Idiopathic chronic gout with tophus     Current Outpatient Medications   Medication Sig Dispense Refill    DULoxetine 60 MG Oral Cap DR Particles Take 2 capsules (120 mg total) by mouth daily. 60 capsule 2    allopurinol 300 MG Oral Tab Take 1 pill  daily 90 tablet 3    colchicine 0.6 MG Oral Tab Take 1 tablet (0.6 mg total) by mouth daily. 90 tablet 0    metoprolol tartrate 50 MG Oral Tab Take 0.5 tablets (25 mg total) by mouth daily. 45 tablet 0    NUCALA 100 MG/ML Subcutaneous Solution Auto-injector       ROSUVASTATIN 20 MG Oral Tab TAKE 1 TABLET(20 MG) BY MOUTH EVERY NIGHT 90 tablet 1    clindamycin 150 MG Oral Cap Take 4 capsules (600 mg total) by mouth as needed. 1 HOUR BEFORE DENTAL APPT      levothyroxine 75 MCG Oral Tab Take 1 tablet (75 mcg total) by mouth before breakfast. 90 tablet 3    mometasone 0.1 % External Ointment Thin layer to affected area nightly for 4 weeks, then every other night x 4 weeks, twice weekly for 4 weeks. See gynecologist for re-assessment. 45 g 3    Budesonide-Formoterol Fumarate 160-4.5 MCG/ACT Inhalation Aerosol Inhale 2 puffs into the lungs 2 (two) times daily.      Albuterol Sulfate  (90 Base) MCG/ACT Inhalation Aero Soln Inhale 2 puffs into the lungs every 4 (four) hours as needed for Wheezing or Shortness of Breath. 1 Inhaler 3    Tiotropium Bromide Monohydrate (SPIRIVA RESPIMAT) 1.25 MCG/ACT Inhalation Aero Soln Inhale 2 sprays into the lungs daily. 3 Inhaler 2    aspirin 81 MG Oral Chew Tab Chew 1 tablet (81 mg total) by mouth every morning.      Omega-3 Fatty Acids (OMEGA 3 OR) Take 1,000 mg by mouth 2 (two) times daily.          Past Medical History:   Diagnosis Date    Acute midline low back pain without sciatica 6/14/2021    Acute postoperative pain of right shoulder 10/22/2020    Asthma (HCC)     Atherosclerosis of aorta (HCC) 9/22/2019    CXR - 3/14/19    Basal cell carcinoma (BCC)     Nose    BRCA1 negative 2019    BRCA2 negative 2019    Cancer screening 03/16/2022    [employer] Common Hereditary Cancers Panel = Negative for 47 genes.     Cellulitis of hand, left 8/3/2016    Chondromalacia of patella     Chronic right shoulder pain 8/11/2021    Colon polyps 4/2013    scope    COVID 01/2021    recved IV  treatment , NO Residual effects.  ASTHMA FLAIR, COUGH FEVERS. RESOLVED    Decreased GFR 9/22/2019    GFR 58 - 10/18/18     Depression     resolved, takes Duloxetine for hot flashes     Dyslipidemia     Endometriosis     Very painful periods. Was suspected to have endometriosis.     Endometriosis 06/2022    Focal endometriosis on path from hysterectomy 6/2022. At time of surgery, findings consistent with scarring from probable stage 3/4 endometriosis.     Eosinophilic asthma (HCC) 11/20/2019    Essential hypertension     Fall 6/14/2021    Family history of heart disease     Ganglion of right wrist     OR by     Head injury 6/14/2021    High blood pressure     High cholesterol     HTN (hypertension)     Hyperlipidemia     Hypothyroidism 7/2014    Inguinal hernia 1998    OR    Lichen sclerosus of vulva 07/25/2022    biopsy proven    Menopausal symptoms     Menopause 2001    age 46.     Nontraumatic complete tear of right rotator cuff 8/11/2020    Nontraumatic rupture of right long head biceps tendon 8/11/2020    Obesity     Obesity (BMI 30-39.9)     MLEISSA (obstructive sleep apnea) 5/26/21-HST    AHI-7    Pap smear for cervical cancer screening 03/07/2022    Pap negative     Plantar fasciitis     left    Prediabetes     Right knee meniscal tear 2009    OR-[facility]    SAH (subarachnoid hemorrhage) (HCC) 6/9/2021    Serous cystadenoma of right ovary 06/24/2022    BSO performed. Pelvic washings benign    CHERELLE (stress urinary incontinence, female)     Had urogyn eval with Dr. Wagoner. Was told she holds her bladder a lot & that she is not a sling candidate.     Tendonitis of ankle, left       Social History:  Social History     Socioeconomic History    Marital status:      Spouse name: Sean    Number of children: 2   Occupational History     Employer: [employer]   Tobacco Use    Smoking status: Former     Packs/day: 1.50     Years: 15.00     Additional pack years: 0.00     Total pack years: 22.50     Types:  Cigarettes     Start date: 1970     Quit date: 1985     Years since quittin.6    Smokeless tobacco: Former   Vaping Use    Vaping Use: Never used   Substance and Sexual Activity    Alcohol use: Yes     Comment: monthly     Drug use: No    Sexual activity: Not Currently     Partners: Male     Birth control/protection: Hysterectomy   Other Topics Concern    Caffeine Concern Yes     Comment: 1 cup of coffee    Exercise Yes     Comment: walking     Family History   Problem Relation Age of Onset    Heart Disease Brother     High Blood Pressure Brother     Cancer Brother 67        2 cancerous growths removed arm    Other (heart diseases) Brother     Heart Disease Brother     High Blood Pressure Brother     Cancer Brother 67        scalp cancerous growth removed    Other (skin cancer) Brother     Diabetes Father     Other (Other) Father     Breast Cancer Mother 60    Heart Disease Mother     Cancer Mother         lung    Breast Cancer 2nd occurrence Mother 65        had 4 occurrences of breast cancer    Cancer Paternal Grandfather         lung    Heart Attack Maternal Grandfather     Heart Disease Maternal Grandmother     Heart Disease Brother 49        stents    High Blood Pressure Brother     Other (Other) Son         vein surgery legs    Breast Cancer Maternal Cousin Female 50        No genetic testing.     Ovarian Cancer Neg     Colon Cancer Neg     Pancreatic Cancer Neg     Melanoma Neg     Uterine Cancer Neg         Allergies  Allergies   Allergen Reactions    Penicillins HIVES     swelling    Sulfa Antibiotics SWELLING         REVIEW OF SYSTEMS:   GENERAL HEALTH:  no fevers   RESPIRATORY: as above  CARDIOVASCULAR: denies chest pain  GI: denies abdominal pain  : no dysuria  NEURO: denies headaches  PSYCH: stress with medical issues   HEME: No adenopathy      EXAM:   /76 (BP Location: Left arm, Patient Position: Sitting, Cuff Size: large)   Pulse 72   Temp 96.8 °F (36 °C) (Temporal)   Resp 18    Ht 5' 6.14\" (1.68 m)   Wt 185 lb (83.9 kg)   LMP  (LMP Unknown)   BMI 29.73 kg/m²   GENERAL: well developed, well nourished,in no apparent distress  HEENT: atraumatic, normocephalic  NECK: supple,no adenopathy  LUNGS: normal rate without respiratory distress, lungs clear to auscultation  CARDIO: RRR nl S1 S2  GI: normal bowel sounds, soft, NT/ND  EXTREMITIES: no cyanosis, clubbing, hands with Hoang's nodes  NEURO: Alert and oriented, no focal deficits       ASSESSMENT AND PLAN:     Encounter Diagnoses   Name          Chronic joint pain- pt has gout and OA with chronic pain, will trial higher dose of duloxetine 120mg daily to help with pain, can also help with mood given death of her son in law and medical problems with her .     Other specified hypothyroidism- clinically stable, check TSH, continue levothyroxine 75mcg daily     Glucose intolerance- watch diet, check [id number]     Dyslipidemia- continue rosuvastatin, check lipids in June     Eosinophilic asthma (HCC)- stable, follows with pulmonary     Essential hypertension- controlled, CPM        Orders Placed This Encounter   Procedures    TSH W Reflex To Free T4 [E]    Lipid Panel [E]    Hemoglobin A1C [E]    Comp Metabolic Panel (14)       Meds & Refills for this Visit:  Requested Prescriptions     Signed Prescriptions Disp Refills    DULoxetine 60 MG Oral Cap DR Particles 60 capsule 2     Sig: Take 2 capsules (120 mg total) by mouth daily.       Imaging & Consults:  None    Return in about 3 months (around 7/8/2024), or if symptoms worsen or fail to improve, for annual visit.  There are no Patient Instructions on file for this visit.      The patient indicates understanding of these issues and agrees to the plan.

## 2024-03-29 DIAGNOSIS — G89.29 CHRONIC JOINT PAIN: ICD-10-CM

## 2024-03-29 DIAGNOSIS — M25.50 CHRONIC JOINT PAIN: ICD-10-CM

## 2024-03-29 DIAGNOSIS — R23.2 HOT FLASHES: ICD-10-CM

## 2024-03-29 RX ORDER — DULOXETIN HYDROCHLORIDE 60 MG/1
60 CAPSULE, DELAYED RELEASE ORAL DAILY
Qty: 30 CAPSULE | Refills: 0 | OUTPATIENT
Start: 2024-03-29

## 2024-04-20 DIAGNOSIS — E78.5 DYSLIPIDEMIA: ICD-10-CM

## 2024-04-22 RX ORDER — ROSUVASTATIN CALCIUM 20 MG/1
TABLET, COATED ORAL
Qty: 90 TABLET | Refills: 0 | Status: SHIPPED | OUTPATIENT
Start: 2024-04-22

## 2024-04-29 RX ORDER — METOPROLOL TARTRATE 50 MG/1
25 TABLET, FILM COATED ORAL DAILY
Qty: 45 TABLET | Refills: 0 | Status: SHIPPED | OUTPATIENT
Start: 2024-04-29

## 2024-05-07 NOTE — TELEPHONE ENCOUNTER
3rd attempt LMTCB to schedule 5/7/2024    This is a 76 y.o. male   Chief Complaint   Patient presents with    Pre-op Exam     Finger surgery on 5/9. [facility].      HPI    Ryan Alejandre Sr is a 76 y.o.  male who comes for a preoperative exam.  He  is referred by Dr. Dewayne Hunter for perioperative risk determination for upcoming surgery for LEFT MIDDLE FINGER TRIGGER FINGER RELEASE on 5/9/2024.  Denies any previous complications with anesthesia. He has been cleared by cardiology.  He is holding his asa for 5 days and eliquis 3 days prior to surgery.  Overall doing well and feeling well, denies concerns today.       Past Medical History:   Diagnosis Date    Anxiety     Atrial fibrillation (HCC)     CAD (coronary artery disease)     Carpal tunnel syndrome 01/2023    bilateral    Carpal tunnel syndrome 01/06/2023    Diabetes mellitus (HCC)     Grief counseling 03/21/2019    Hyperlipidemia     Hypertension     Injury of right knee 01/11/2024    Nodule of left palm 09/06/2023    WILLIAM on CPAP     Sprain of right rotator cuff capsule 01/13/2022    Sternoclavicular (joint) (ligament) sprain, right, initial encounter 01/13/2022       Past Surgical History:   Procedure Laterality Date    ANGIOPLASTY  08/30/2022    4 stents    ARM SURGERY Left 01/05/2023    LEFT ULNAR NERVE DECOMPRESSION AT ELBOW performed by Dewayne Hunter MD at [facility] OR    CARDIAC SURGERY  bypass. nov 2004    4 stents august 30 2022    CARDIOVERSION  2017    CARPAL TUNNEL RELEASE Left 01/05/2023    LEFT CARPAL TUNNEL RELEASE performed by Dewayne Hunter MD at [facility] OR    COLONOSCOPY      CORONARY ARTERY BYPASS GRAFT  11/2004    ESOPHAGOGASTRODUODENOSCOPY      TONSILLECTOMY         Social History     Socioeconomic History    Marital status:      Spouse name: Not on file    Number of children: Not on file    Years of education: Not on file    Highest education level: Not on file   Occupational History    Not on file   Tobacco Use    Smoking status: Former     Current packs/day: 0.00

## 2024-05-22 DIAGNOSIS — Z51.81 THERAPEUTIC DRUG MONITORING: ICD-10-CM

## 2024-05-22 DIAGNOSIS — M1A.00X1 IDIOPATHIC CHRONIC GOUT WITH TOPHUS, UNSPECIFIED SITE: ICD-10-CM

## 2024-05-22 RX ORDER — COLCHICINE 0.6 MG/1
0.6 TABLET ORAL DAILY
Qty: 90 TABLET | Refills: 1 | Status: SHIPPED | OUTPATIENT
Start: 2024-05-22

## 2024-05-22 NOTE — TELEPHONE ENCOUNTER
LOV:  03/08/2024     Future Appointments   Date Time Provider Department Center   7/11/2024 11:40 AM Kristal Valdez MD [facility]   10/8/2024  9:45 AM Thad Omalley MD [id number]       LF:  02/16/2024     QTY:  90    Refills:  0    LABS:   Uric Acid  Order: [id number]   Collected 3/6/2024  7:02 AM       Status: Final result       Dx: Therapeutic drug monitoring; Idiopath...    2 Result Notes       1 Patient Communication      Component  Ref Range & Units 3/6/24  7:02 AM   Uric Acid  2.6 - 6.0 mg/dL 4.0   Resulting Agency [facility] ([facility])              Specimen Collected: 03/06/24  7:02 AM Last Resulted: 03/06/24  8:16 AM

## 2024-05-28 DIAGNOSIS — E03.9 HYPOTHYROIDISM, UNSPECIFIED TYPE: ICD-10-CM

## 2024-05-30 NOTE — TELEPHONE ENCOUNTER
Please Review. Protocol Failed; No Protocol   Messaged patient to complete labs     Requested Prescriptions   Pending Prescriptions Disp Refills    levothyroxine 75 MCG Oral Tab 90 tablet 3     Sig: Take 1 tablet (75 mcg total) by mouth before breakfast.       Thyroid Medication Protocol Failed - 5/28/2024 10:03 AM        Failed - TSH in past 12 months        Passed - Last TSH value is normal     Lab Results   Component Value Date    TSH 2.060 03/08/2023                 Passed - In person appointment or virtual visit in the past 12 mos or appointment in next 3 mos     Recent Outpatient Visits              2 months ago Chronic joint pain    [address] Kristal Valdez MD    Office Visit    2 months ago Idiopathic chronic gout with tophus, unspecified site    [facility] Thad Omalley MD    Office Visit    6 months ago Eosinophilic asthma (HCC)    [address] Kristal Valdez MD    Office Visit    7 months ago Idiopathic chronic gout with tophus, unspecified site    [facility] Thad Omalley MD    Office Visit    8 months ago Arthralgia of both hands    [facility] Thad Omalley MD    Office Visit          Future Appointments         Provider Department Appt Notes    In 1 month Kristal Valdez MD [address] Supervisit-last one 4/25/23 [id number]    In 4 months Thad Omalley MD [facility] 7mo                           Future Appointments         Provider Department Appt Notes    In 1 month Kristal Valdez MD [address] Supervisit-last one 4/25/23 [id number]    In 4 months Thad Omalley MD [facility] 7mo          Recent Outpatient Visits               2 months ago Chronic joint pain    [facility], [address] [place] [address], Kristal Maldonado MD    Office Visit    2 months ago Idiopathic chronic gout with tophus, unspecified site    [facility], [facility], Thad Viramontes MD    Office Visit    6 months ago Eosinophilic asthma (HCC)    [facility], [address] [place] [address], Kristal Maldonado MD    Office Visit    7 months ago Idiopathic chronic gout with tophus, unspecified site    [facility], [facility], Thad Viramontes MD    Office Visit    8 months ago Arthralgia of both hands    [facility], [facility], Thad Viramontes MD    Office Visit

## 2024-05-31 RX ORDER — LEVOTHYROXINE SODIUM 0.07 MG/1
75 TABLET ORAL
Qty: 90 TABLET | Refills: 0 | Status: SHIPPED | OUTPATIENT
Start: 2024-05-31

## 2024-06-24 ENCOUNTER — HOSPITAL ENCOUNTER (OUTPATIENT)
Dept: ULTRASOUND IMAGING | Facility: HOSPITAL | Age: 69
Discharge: HOME | End: 2024-06-24
Attending: NURSE PRACTITIONER

## 2024-06-24 ENCOUNTER — OFFICE VISIT (OUTPATIENT)
Dept: INTERNAL MEDICINE CLINIC | Facility: CLINIC | Age: 69
End: 2024-06-24

## 2024-06-24 VITALS
RESPIRATION RATE: 16 BRPM | HEIGHT: 66 IN | WEIGHT: 187.63 LBS | HEART RATE: 64 BPM | TEMPERATURE: 99 F | BODY MASS INDEX: 30.16 KG/M2 | DIASTOLIC BLOOD PRESSURE: 78 MMHG | SYSTOLIC BLOOD PRESSURE: 126 MMHG | OXYGEN SATURATION: 100 %

## 2024-06-24 DIAGNOSIS — I83.91 VARICOSE VEINS OF RIGHT LOWER LEG: ICD-10-CM

## 2024-06-24 DIAGNOSIS — M79.89 RIGHT LEG SWELLING: ICD-10-CM

## 2024-06-24 DIAGNOSIS — I25.10 CORONARY ARTERY CALCIFICATION SEEN ON CT SCAN: ICD-10-CM

## 2024-06-24 DIAGNOSIS — J82.83 EOSINOPHILIC ASTHMA (HCC): ICD-10-CM

## 2024-06-24 DIAGNOSIS — Z12.31 ENCOUNTER FOR SCREENING MAMMOGRAM FOR MALIGNANT NEOPLASM OF BREAST: ICD-10-CM

## 2024-06-24 DIAGNOSIS — I10 ESSENTIAL HYPERTENSION: ICD-10-CM

## 2024-06-24 DIAGNOSIS — M79.89 RIGHT LEG SWELLING: Primary | ICD-10-CM

## 2024-06-24 PROCEDURE — G2211 COMPLEX E/M VISIT ADD ON: HCPCS | Performed by: NURSE PRACTITIONER

## 2024-06-24 PROCEDURE — 93971 EXTREMITY STUDY: CPT | Performed by: NURSE PRACTITIONER

## 2024-06-24 PROCEDURE — 99214 OFFICE O/P EST MOD 30 MIN: CPT | Performed by: NURSE PRACTITIONER

## 2024-06-24 NOTE — PROGRESS NOTES
Sarah Mejia is a 69 year old female.    Chief Complaint   Patient presents with    Swelling     TA RM10 Swelling in right leg since Friday, denies any pain.        HPI:   here for eval of right leg swelling   she states she noted her right leg is more swollen than her left.   She is uncertain how long as she usually wears leggings and over the weekend wore shorts and noted the swelling.   No pain   she did travel recently in early June to [place].  Flew to [place] drove the [place] and flew home from [place].    Wore compression last night and did help per her report.    No SOB. No personal history of blood clots.   ? Family hx  Describes entire right leg and even swelling of her foot.  Better in the am and worse by end of day.  Today better after compression  Known varicose veins which she says are more bulging and area is larger. (Inner right lower leg just below the knee) soft  non tender.    Known CAD  follows with Dr Huitron.  No chest pain.      Asthma   Dr Resendiz.  Has been flaring in the past few months.  She will discuss with Dr Resendiz regarding allergy testing.      Hx glucose intolerance.  She did have coffee with sugar  will hold on labs until fasting.      Patient Active Problem List   Diagnosis    Essential hypertension    Dyslipidemia    Hypothyroidism    Family history of heart disease    Personal history of colonic polyps    Atherosclerosis of aorta (HCC)    Eosinophilic asthma (HCC)    Head injury    Family history of breast cancer in mother    Chronic vulvitis    History of subarachnoid hemorrhage    Hot flashes    Status post laparoscopic hysterectomy    S/P bilateral salpingo-oophorectomy    Pelvic adhesions    Lichen sclerosus of vulva    Onychomycosis    Primary osteoarthritis of both hands    Blood glucose elevated    Speech disturbance    Glucose intolerance    Idiopathic chronic gout with tophus    Coronary artery calcification seen on CT scan     Current Outpatient Medications    Medication Sig Dispense Refill    levothyroxine 75 MCG Oral Tab Take 1 tablet (75 mcg total) by mouth before breakfast. 90 tablet 0    colchicine 0.6 MG Oral Tab Take 1 tablet (0.6 mg total) by mouth daily. 90 tablet 1    METOPROLOL TARTRATE 50 MG Oral Tab TAKE 1/2 TABLET(25 MG) BY MOUTH DAILY 45 tablet 0    rosuvastatin 20 MG Oral Tab TAKE 1 TABLET(20 MG) BY MOUTH EVERY NIGHT 90 tablet 0    DULoxetine 60 MG Oral Cap DR Particles Take 2 capsules (120 mg total) by mouth daily. 60 capsule 2    allopurinol 300 MG Oral Tab Take 1 pill daily 90 tablet 3    NUCALA 100 MG/ML Subcutaneous Solution Auto-injector       clindamycin 150 MG Oral Cap Take 4 capsules (600 mg total) by mouth as needed. 1 HOUR BEFORE DENTAL APPT      mometasone 0.1 % External Ointment Thin layer to affected area nightly for 4 weeks, then every other night x 4 weeks, twice weekly for 4 weeks. See gynecologist for re-assessment. 45 g 3    Budesonide-Formoterol Fumarate 160-4.5 MCG/ACT Inhalation Aerosol Inhale 2 puffs into the lungs 2 (two) times daily.      Albuterol Sulfate  (90 Base) MCG/ACT Inhalation Aero Soln Inhale 2 puffs into the lungs every 4 (four) hours as needed for Wheezing or Shortness of Breath. 1 Inhaler 3    Tiotropium Bromide Monohydrate (SPIRIVA RESPIMAT) 1.25 MCG/ACT Inhalation Aero Soln Inhale 2 sprays into the lungs daily. 3 Inhaler 2    aspirin 81 MG Oral Chew Tab Chew 1 tablet (81 mg total) by mouth every morning.      Omega-3 Fatty Acids (OMEGA 3 OR) Take 1,000 mg by mouth 2 (two) times daily.          Past Medical History:    Acute midline low back pain without sciatica    Acute postoperative pain of right shoulder    Asthma (HCC)    Atherosclerosis of aorta (HCC)    CXR - 3/14/19    Basal cell carcinoma (BCC)    Nose    BRCA1 negative    BRCA2 negative    Cancer screening    [employer] Common Hereditary Cancers Panel = Negative for 47 genes.     Cellulitis of hand, left    Chondromalacia of patella    Chronic right  shoulder pain    Colon polyps    scope    COVID    recved IV treatment , NO Residual effects.  ASTHMA FLAIR, COUGH FEVERS. RESOLVED    Decreased GFR    GFR 58 - 10/18/18     Depression    resolved, takes Duloxetine for hot flashes     Dyslipidemia    Endometriosis    Very painful periods. Was suspected to have endometriosis.     Endometriosis    Focal endometriosis on path from hysterectomy 2022. At time of surgery, findings consistent with scarring from probable stage 3/4 endometriosis.     Eosinophilic asthma (HCC)    Essential hypertension    Fall    Family history of heart disease    Ganglion of right wrist    OR by     Head injury    High blood pressure    High cholesterol    HTN (hypertension)    Hyperlipidemia    Hypothyroidism    Inguinal hernia    OR    Lichen sclerosus of vulva    biopsy proven    Menopausal symptoms    Menopause    age 46.     Nontraumatic complete tear of right rotator cuff    Nontraumatic rupture of right long head biceps tendon    Obesity    Obesity (BMI 30-39.9)    MELISSA (obstructive sleep apnea)    AHI-7    Pap smear for cervical cancer screening    Pap negative     Plantar fasciitis    left    Prediabetes    Right knee meniscal tear    OR-Goddard    SAH (subarachnoid hemorrhage) (HCC)    Serous cystadenoma of right ovary    BSO performed. Pelvic washings benign    CHERELLE (stress urinary incontinence, female)    Had urogyn eval with Dr. Wagoner. Was told she holds her bladder a lot & that she is not a sling candidate.     Tendonitis of ankle, left      Social History:  Social History     Socioeconomic History    Marital status:      Spouse name: Sean    Number of children: 2   Occupational History     Employer: [employer]   Tobacco Use    Smoking status: Former     Current packs/day: 0.00     Average packs/day: 1.5 packs/day for 15.6 years (23.4 ttl pk-yrs)     Types: Cigarettes     Start date: 1970     Quit date: 1985     Years since quittin.8    Smokeless  tobacco: Former   Vaping Use    Vaping status: Never Used   Substance and Sexual Activity    Alcohol use: Yes     Comment: monthly     Drug use: No    Sexual activity: Not Currently     Partners: Male     Birth control/protection: Hysterectomy   Other Topics Concern    Caffeine Concern Yes     Comment: 1 cup of coffee    Exercise Yes     Comment: walking     Family History   Problem Relation Age of Onset    Heart Disease Brother     High Blood Pressure Brother     Cancer Brother 67        2 cancerous growths removed arm    Other (heart diseases) Brother     Heart Disease Brother     High Blood Pressure Brother     Cancer Brother 67        scalp cancerous growth removed    Other (skin cancer) Brother     Diabetes Father     Other (Other) Father     Breast Cancer Mother 60    Heart Disease Mother     Cancer Mother         lung    Breast Cancer 2nd occurrence Mother 65        had 4 occurrences of breast cancer    Cancer Paternal Grandfather         lung    Heart Attack Maternal Grandfather     Heart Disease Maternal Grandmother     Heart Disease Brother 49        stents    High Blood Pressure Brother     Other (Other) Son         vein surgery legs    Breast Cancer Maternal Cousin Female 50        No genetic testing.     Ovarian Cancer Neg     Colon Cancer Neg     Pancreatic Cancer Neg     Melanoma Neg     Uterine Cancer Neg         Allergies  Allergies   Allergen Reactions    Penicillins HIVES     swelling    Sulfa Antibiotics SWELLING       REVIEW OF SYSTEMS:   GENERAL HEALTH: feels well otherwise  SKIN: denies any unusual skin lesions or rashes  RESPIRATORY: denies shortness of breath with exertion, no cough  CARDIOVASCULAR: denies chest pain on exertion, no palpatations  MUSCULOSKELETAL:  No arthralgias or myalgias  NEURO: denies headaches,     EXAM:   /78   Pulse 64   Temp 98.7 °F (37.1 °C)   Resp 16   Ht 5' 6\" (1.676 m)   Wt 187 lb 9.6 oz (85.1 kg)   LMP  (LMP Unknown)   SpO2 100%   BMI 30.28 kg/m²    GENERAL: well developed, well nourished,in no apparent distress  LUNGS: normal rate without respiratory distress, lungs clear to auscultation  CARDIO: RRR   GI: normal bowel sounds, no masses, HSM or tenderness  no inguinal lymphadenopathy.   EXTREMITIES: mild right > left leg swelling  good pulses.  Varicose veins bulging inner lower right leg.  Soft.  normal strength and tone  PSYCH: alert and oriented x 3    ASSESSMENT AND PLAN:     Encounter Diagnoses   Name Primary?    Right leg swelling  stat venous US r/o DVT.  If negative, will monitor and consider w/u for venous insufficiency.   Yes    Encounter for screening mammogram for malignant neoplasm of breast     Essential hypertension  stable  continue same meds.      Eosinophilic asthma (HCC)  some flare with current weather.  She is concerned an allergy component.  Follows with Dr Resendiz.  Will discuss if Dr Rodriguez would be a good option as well inaddition to pulmonary.      Coronary artery calcification seen on CT scan  stable      Varicose veins of right lower leg        No orders of the defined types were placed in this encounter.      Meds & Refills for this Visit:  Requested Prescriptions      No prescriptions requested or ordered in this encounter       Imaging & Consults:  [facility] OCTAVIO 2D+3D SCREENING BILAT (CPT=77067/29626)  US VENOUS DOPPLER LEG RIGHT - DIAG IMG (CPT=93971)    No follow-ups on file.  There are no Patient Instructions on file for this visit.

## 2024-07-02 ENCOUNTER — LAB ENCOUNTER (OUTPATIENT)
Dept: LAB | Age: 69
End: 2024-07-02
Attending: INTERNAL MEDICINE
Payer: MEDICARE

## 2024-07-02 DIAGNOSIS — E03.8 OTHER SPECIFIED HYPOTHYROIDISM: ICD-10-CM

## 2024-07-02 DIAGNOSIS — E78.5 DYSLIPIDEMIA: ICD-10-CM

## 2024-07-02 DIAGNOSIS — E74.39 GLUCOSE INTOLERANCE: ICD-10-CM

## 2024-07-02 LAB
ALBUMIN SERPL-MCNC: 4.1 G/DL (ref 3.4–5)
ALBUMIN/GLOB SERPL: 1.6 {RATIO} (ref 1–2)
ALP LIVER SERPL-CCNC: 82 U/L
ALT SERPL-CCNC: 39 U/L
ANION GAP SERPL CALC-SCNC: 8 MMOL/L (ref 0–18)
AST SERPL-CCNC: 32 U/L (ref 15–37)
BILIRUB SERPL-MCNC: 0.8 MG/DL (ref 0.1–2)
BUN BLD-MCNC: 20 MG/DL (ref 9–23)
CALCIUM BLD-MCNC: 9.2 MG/DL (ref 8.5–10.1)
CHLORIDE SERPL-SCNC: 109 MMOL/L (ref 98–112)
CHOLEST SERPL-MCNC: 155 MG/DL (ref ?–200)
CO2 SERPL-SCNC: 24 MMOL/L (ref 21–32)
CREAT BLD-MCNC: 0.88 MG/DL
EGFRCR SERPLBLD CKD-EPI 2021: 70 ML/MIN/1.73M2 (ref 60–?)
EST. AVERAGE GLUCOSE BLD GHB EST-MCNC: 117 MG/DL (ref 68–126)
FASTING PATIENT LIPID ANSWER: YES
FASTING STATUS PATIENT QL REPORTED: YES
GLOBULIN PLAS-MCNC: 2.6 G/DL (ref 2.8–4.4)
GLUCOSE BLD-MCNC: 87 MG/DL (ref 70–99)
HBA1C MFR BLD: 5.7 % (ref ?–5.7)
HDLC SERPL-MCNC: 41 MG/DL (ref 40–59)
LDLC SERPL CALC-MCNC: 93 MG/DL (ref ?–100)
NONHDLC SERPL-MCNC: 114 MG/DL (ref ?–130)
OSMOLALITY SERPL CALC.SUM OF ELEC: 294 MOSM/KG (ref 275–295)
POTASSIUM SERPL-SCNC: 4.3 MMOL/L (ref 3.5–5.1)
PROT SERPL-MCNC: 6.7 G/DL (ref 6.4–8.2)
SODIUM SERPL-SCNC: 141 MMOL/L (ref 136–145)
TRIGL SERPL-MCNC: 115 MG/DL (ref 30–149)
TSI SER-ACNC: 3.19 MIU/ML (ref 0.36–3.74)
VLDLC SERPL CALC-MCNC: 19 MG/DL (ref 0–30)

## 2024-07-02 PROCEDURE — 83036 HEMOGLOBIN GLYCOSYLATED A1C: CPT

## 2024-07-02 PROCEDURE — 80053 COMPREHEN METABOLIC PANEL: CPT

## 2024-07-02 PROCEDURE — 84443 ASSAY THYROID STIM HORMONE: CPT

## 2024-07-02 PROCEDURE — 80061 LIPID PANEL: CPT

## 2024-07-02 PROCEDURE — 36415 COLL VENOUS BLD VENIPUNCTURE: CPT

## 2024-07-08 ENCOUNTER — HOSPITAL ENCOUNTER (OUTPATIENT)
Dept: MAMMOGRAPHY | Age: 69
Discharge: HOME | End: 2024-07-08
Attending: NURSE PRACTITIONER
Payer: MEDICARE

## 2024-07-08 DIAGNOSIS — Z00.00 ENCOUNTER FOR ANNUAL HEALTH EXAMINATION: ICD-10-CM

## 2024-07-08 DIAGNOSIS — Z12.31 ENCOUNTER FOR SCREENING MAMMOGRAM FOR MALIGNANT NEOPLASM OF BREAST: ICD-10-CM

## 2024-07-08 DIAGNOSIS — I10 ESSENTIAL HYPERTENSION: Primary | ICD-10-CM

## 2024-07-08 PROCEDURE — 77067 SCR MAMMO BI INCL CAD: CPT | Performed by: NURSE PRACTITIONER

## 2024-07-08 PROCEDURE — 77063 BREAST TOMOSYNTHESIS BI: CPT | Performed by: NURSE PRACTITIONER

## 2024-07-11 ENCOUNTER — OFFICE VISIT (OUTPATIENT)
Dept: INTERNAL MEDICINE CLINIC | Facility: CLINIC | Age: 69
End: 2024-07-11
Payer: MEDICARE

## 2024-07-11 VITALS
HEART RATE: 62 BPM | TEMPERATURE: 97 F | HEIGHT: 66 IN | SYSTOLIC BLOOD PRESSURE: 110 MMHG | OXYGEN SATURATION: 96 % | BODY MASS INDEX: 30.59 KG/M2 | RESPIRATION RATE: 16 BRPM | DIASTOLIC BLOOD PRESSURE: 68 MMHG | WEIGHT: 190.38 LBS

## 2024-07-11 DIAGNOSIS — Z00.00 ENCOUNTER FOR MEDICARE ANNUAL WELLNESS EXAM: Primary | ICD-10-CM

## 2024-07-11 DIAGNOSIS — E74.39 GLUCOSE INTOLERANCE: ICD-10-CM

## 2024-07-11 DIAGNOSIS — I83.90 VARICOSE VEIN: ICD-10-CM

## 2024-07-11 DIAGNOSIS — I10 ESSENTIAL HYPERTENSION: ICD-10-CM

## 2024-07-11 DIAGNOSIS — R23.2 HOT FLASHES: ICD-10-CM

## 2024-07-11 DIAGNOSIS — E03.8 OTHER SPECIFIED HYPOTHYROIDISM: ICD-10-CM

## 2024-07-11 DIAGNOSIS — M19.042 PRIMARY OSTEOARTHRITIS OF BOTH HANDS: ICD-10-CM

## 2024-07-11 DIAGNOSIS — M19.041 PRIMARY OSTEOARTHRITIS OF BOTH HANDS: ICD-10-CM

## 2024-07-11 DIAGNOSIS — E78.5 DYSLIPIDEMIA: ICD-10-CM

## 2024-07-11 DIAGNOSIS — M1A.00X1 IDIOPATHIC CHRONIC GOUT WITH TOPHUS, UNSPECIFIED SITE: ICD-10-CM

## 2024-07-11 DIAGNOSIS — I70.0 ATHEROSCLEROSIS OF AORTA (HCC): ICD-10-CM

## 2024-07-11 DIAGNOSIS — J82.83 EOSINOPHILIC ASTHMA (HCC): ICD-10-CM

## 2024-07-11 NOTE — PROGRESS NOTES
Subjective:   Sarah Mejia is a 69 year old female who presents for a MA AHA (Medicare Advantage Annual Health Assessment) and Subsequent Annual Wellness visit (Pt already had Initial Annual Wellness) and scheduled follow up of multiple significant but stable problems.    1. Encounter for Medicare annual wellness exam (Primary)- she is up to date on mammogram, colonoscopy and dexa scan.  She will consider covid 19 booster in the fall.  2. Primary osteoarthritis of both hands- still with discomfort on multiple fingers, has OA and gout  3. Essential hypertension- BP controlled, no HA/CP/DZ  4. Glucose intolerance- hgba1c 5.7, watches diet  5. Dyslipidemia- compliant with rosuvastatin, no SE reported. No cardiac complaints  6. Atherosclerosis of aorta (HCC)- continue rosuvastatin, f/u cardiology as planned  7. Other specified hypothyroidism- clinically stable, normal TSH on labs this month  8. Idiopathic chronic gout with tophus, unspecified site- allopurinol increased by rheum recently, f/u as directed  9. Eosinophilic asthma (HCC)- stable, follows with pulmonary  10. Varicose vein (right leg) with mild edema- edema better with compression stocking, varicose veins in RLE are bulging out. Denies pain  11. Hot flashes- controlled,  compliant with duloxetine    History/Other:   Fall Risk Assessment:   She has been screened for Falls and is low risk.      Cognitive Assessment:   She had a completely normal cognitive assessment - see flowsheet entries       Functional Ability/Status:   Sarah Mejia has some abnormal functions as listed below:  She has Hearing problems based on screening of functional status.She has Vision problems based on screening of functional status. She has problems with Memory based on screening of functional status.       Depression Screening (PHQ):  PHQ-2 SCORE: 0  , done 7/11/2024            Advanced Directives:   She does have a Living Will but we do NOT have it on file in Epic.    She  has a Power of  for Health Care on file in [facility].  Not discussed      Patient Active Problem List   Diagnosis    Essential hypertension    Dyslipidemia    Hypothyroidism    Family history of heart disease    Personal history of colonic polyps    Atherosclerosis of aorta (HCC)    Eosinophilic asthma (HCC)    Head injury    Family history of breast cancer in mother    Chronic vulvitis    History of subarachnoid hemorrhage    Hot flashes    Status post laparoscopic hysterectomy    S/P bilateral salpingo-oophorectomy    Pelvic adhesions    Lichen sclerosus of vulva    Onychomycosis    Primary osteoarthritis of both hands    Blood glucose elevated    Speech disturbance    Glucose intolerance    Idiopathic chronic gout with tophus    Coronary artery calcification seen on CT scan     Allergies:  She is allergic to penicillins and sulfa antibiotics.    Current Medications:  Outpatient Medications Marked as Taking for the 7/11/24 encounter (Office Visit) with Kristal Valdez MD   Medication Sig    levothyroxine 75 MCG Oral Tab Take 1 tablet (75 mcg total) by mouth before breakfast.    colchicine 0.6 MG Oral Tab Take 1 tablet (0.6 mg total) by mouth daily.    METOPROLOL TARTRATE 50 MG Oral Tab TAKE 1/2 TABLET(25 MG) BY MOUTH DAILY    rosuvastatin 20 MG Oral Tab TAKE 1 TABLET(20 MG) BY MOUTH EVERY NIGHT    DULoxetine 60 MG Oral Cap DR Particles Take 2 capsules (120 mg total) by mouth daily.    allopurinol 300 MG Oral Tab Take 1 pill daily    NUCALA 100 MG/ML Subcutaneous Solution Auto-injector     clindamycin 150 MG Oral Cap Take 4 capsules (600 mg total) by mouth as needed. 1 HOUR BEFORE DENTAL APPT    mometasone 0.1 % External Ointment Thin layer to affected area nightly for 4 weeks, then every other night x 4 weeks, twice weekly for 4 weeks. See gynecologist for re-assessment.    Budesonide-Formoterol Fumarate 160-4.5 MCG/ACT Inhalation Aerosol Inhale 2 puffs into the lungs 2 (two) times daily.    Albuterol Sulfate   (90 Base) MCG/ACT Inhalation Aero Soln Inhale 2 puffs into the lungs every 4 (four) hours as needed for Wheezing or Shortness of Breath.    Tiotropium Bromide Monohydrate (SPIRIVA RESPIMAT) 1.25 MCG/ACT Inhalation Aero Soln Inhale 2 sprays into the lungs daily.    aspirin 81 MG Oral Chew Tab Chew 1 tablet (81 mg total) by mouth every morning.    Omega-3 Fatty Acids (OMEGA 3 OR) Take 1,000 mg by mouth 2 (two) times daily.         Medical History:  She  has a past medical history of Acute midline low back pain without sciatica (6/14/2021), Acute postoperative pain of right shoulder (10/22/2020), Asthma ([facility]), Atherosclerosis of aorta ([facility]) (9/22/2019), Basal cell carcinoma (BCC), BRCA1 negative (2019), BRCA2 negative (2019), Cancer screening (03/16/2022), Cellulitis of hand, left (8/3/2016), Chondromalacia of patella, Chronic right shoulder pain (8/11/2021), Colon polyps (4/2013), COVID (01/2021), Decreased GFR (9/22/2019), Depression, Dyslipidemia, Endometriosis, Endometriosis (06/2022), Eosinophilic asthma ([facility]) (11/20/2019), Essential hypertension, Fall (6/14/2021), Family history of heart disease, Ganglion of right wrist, Head injury (6/14/2021), High blood pressure, High cholesterol, HTN (hypertension), Hyperlipidemia, Hypothyroidism (7/2014), Inguinal hernia (1998), Lichen sclerosus of vulva (07/25/2022), Menopausal symptoms, Menopause (2001), Nontraumatic complete tear of right rotator cuff (8/11/2020), Nontraumatic rupture of right long head biceps tendon (8/11/2020), Obesity, Obesity (BMI 30-39.9), MELISSA (obstructive sleep apnea) (5/26/21-[facility]ST), Pap smear for cervical cancer screening (03/07/2022), Plantar fasciitis, Prediabetes, Right knee meniscal tear (2009), SAH (subarachnoid hemorrhage) ([facility]) (6/9/2021), Serous cystadenoma of right ovary (06/24/2022), CHERELLE (stress urinary incontinence, female), and Tendonitis of ankle, left.  Surgical History:  She  has a past surgical history that includes hernia  surgery (); knee arthroscopy (); colonoscopy (); colonoscopy (2013); excis primary ganglion wrist; skin surgery (10/26/2020); arthroscopy of joint unlisted (Right); hernia surgery (2021); other (); hc endometrial sampling w or wo endocerv sampling any method (2022); hysterectomy (2022); oophorectomy (Bilateral, 2022); salpingectomy (Bilateral, 2022); and biopsy vulva/perineum,marco antonio lesn (Bilateral, 2022).   Family History:  Her family history includes Breast Cancer (age of onset: 50) in her maternal cousin female; Breast Cancer (age of onset: 60) in her mother; Breast Cancer 2nd occurrence (age of onset: 65) in her mother; Cancer in her mother and paternal grandfather; Cancer (age of onset: 67) in her brother and brother; Diabetes in her father; Heart Attack in her maternal grandfather; Heart Disease in her brother, brother, maternal grandmother, and mother; Heart Disease (age of onset: 49) in her brother; High Blood Pressure in her brother, brother, and brother; Other in her father and son; heart diseases in her brother; skin cancer in her brother.  Social History:  She  reports that she quit smoking about 38 years ago. Her smoking use included cigarettes. She started smoking about 54 years ago. She has a 23.4 pack-year smoking history. She has quit using smokeless tobacco. She reports current alcohol use. She reports that she does not use drugs.    Tobacco:  She smoked tobacco in the past but quit greater than 12 months ago.  Social History     Tobacco Use   Smoking Status Former    Current packs/day: 0.00    Average packs/day: 1.5 packs/day for 15.6 years (23.4 ttl pk-yrs)    Types: Cigarettes    Start date: 1970    Quit date: 1985    Years since quittin.8   Smokeless Tobacco Former        CAGE Alcohol Screen:   CAGE screening score of 0 on 2024, showing low risk of alcohol abuse.      Patient Care Team:  Kristal Valdez MD as PCP -  General (Internal Medicine)  Chauncey Goddard MD (SURGERY, ORTHOPEDIC)  Ravi Steiner (OPTOMETRY)  Mary Ann Candelaria MD (GASTROENTEROLOGY)  Nelson Enciso MD (SURGERY, ORTHOPEDIC)  Alesia Thurman MD (DERMATOLOGY)  Tyrell Sheriff MD (SURGERY, GENERAL)  Oksana Campos MD as Consulting Physician (NEUROLOGY)    Review of Systems     Negative for f/c/CP    Objective:   Physical Exam  General Appearance:  Alert, cooperative, no distress, appears stated age   Head:  Normocephalic, without obvious abnormality, atraumatic   Eyes:  PERRL, conjunctiva/corneas clear, EOM's intact both eyes   Ears:  Normal TM's and external ear canals, both ears   Nose: Nares normal, septum midline,mucosa normal, no drainage or sinus tenderness   Throat: Lips, mucosa, and tongue normal; teeth and gums normal   Neck: Supple, symmetrical, trachea midline, no adenopathy   Back:   Symmetric, no curvature, ROM normal, no CVA tenderness   Lungs:   Clear to auscultation bilaterally, respirations unlabored   Heart:  Regular rate and rhythm, S1 and S2 normal   Abdomen:   Soft, non-tender, bowel sounds active all four quadrants,  no masses, no organomegaly   Pelvic: Deferred   Extremities: RLE varicose veins, trace edema   Pulses: 2+ and symmetric   Skin: No acute rashes   Lymph nodes: Cervical, supraclavicular, and axillary nodes normal   Neurologic: Normal     /68 (BP Location: Left arm, Patient Position: Sitting, Cuff Size: adult)   Pulse 62   Temp 97 °F (36.1 °C) (Temporal)   Resp 16   Ht 5' 6\" (1.676 m)   Wt 190 lb 6.4 oz (86.4 kg)   LMP  (LMP Unknown)   SpO2 96%   BMI 30.73 kg/m²  Estimated body mass index is 30.73 kg/m² as calculated from the following:    Height as of this encounter: 5' 6\" (1.676 m).    Weight as of this encounter: 190 lb 6.4 oz (86.4 kg).    Medicare Hearing Assessment:   Hearing Screening    Time taken: 7/11/2024 11:47 AM  Entry User: Iliana Vides  Screening Method: Finger Rub  Finger Rub Result: Fail          Visual Acuity:   Right Eye Visual Acuity: Corrected Right Eye Chart Acuity: 20/20   Left Eye Visual Acuity: Corrected Left Eye Chart Acuity: 20/20   Both Eyes Visual Acuity: Corrected Both Eyes Chart Acuity: 20/25   Able To Tolerate Visual Acuity: Yes        Assessment & Plan:   Sarah Mejia is a 69 year old female who presents for a Medicare Assessment.     1. Encounter for Medicare annual wellness exam (Primary)- she is up to date on mammogram, colonoscopy and dexa scan.  She will consider covid 19 booster in the fall.  2. Primary osteoarthritis of both hands- otc tylenol prn, f/u rheumatology  3. Essential hypertension- controlled, CPM  4. Glucose intolerance- hgba1c 5.7, watch diet  5. Dyslipidemia- controlled on rosuvastatin  6. Atherosclerosis of aorta (HCC)- continue rosuvastatin, f/u cardiology as planned  7. Other specified hypothyroidism- clinically stable, normal TSH, CPM  8. Idiopathic chronic gout with tophus, unspecified site- allopurinol increased by rheum recently, f/u as directed  9. Eosinophilic asthma (HCC)- stable, follows with pulmonary  10. Varicose vein (right leg) with mild edema- continue compression stocking, elevation, referred to Dr Roa  -     SURGERY - INTERNAL  11. Hot flashes- stable, continue duloxetine    The patient indicates understanding of these issues and agrees to the plan.  Continue with current treatment plan.  Reinforced healthy diet, lifestyle, and exercise.      Return in about 6 months (around 1/11/2025), or if symptoms worsen or fail to improve, for f/u chronic issues.     Krisatl Valdez MD, 7/11/2024     Supplementary Documentation:   General Health:  In the past six months, have you lost more than 10 pounds without trying?: 2 - No  Has your appetite been poor?: No  Type of Diet: Other  How does the patient maintain a good energy level?: Other  How would you describe your daily physical activity?: Light  How would you describe your current health state?:  Fair  How do you maintain positive mental well-being?: Social Interaction;Puzzles;Visiting Friends;Visiting Family  On a scale of 0 to 10, with 0 being no pain and 10 being severe pain, what is your pain level?: 4 - (Moderate)  In the past six months, have you experienced urine leakage?: 1-Yes  At any time do you feel concerned for the safety/well-being of yourself and/or your children, in your home or elsewhere?: No  Have you had any immunizations at another office such as Influenza, Hepatitis B, Tetanus, or Pneumococcal?: Yes    Health Maintenance   Topic Date Due    MA Annual Health Assessment  01/01/2024    COVID-19 Vaccine (7 - 2023-24 season) 01/26/2024    Influenza Vaccine (1) 10/01/2024    Mammogram  07/08/2025    Colorectal Cancer Screening  11/15/2028    DEXA Scan  Completed    Annual Depression Screening  Completed    Fall Risk Screening (Annual)  Completed    Pneumococcal Vaccine: 65+ Years  Completed    Zoster Vaccines  Completed

## 2024-07-29 DIAGNOSIS — E78.5 DYSLIPIDEMIA: ICD-10-CM

## 2024-07-31 RX ORDER — METOPROLOL TARTRATE 50 MG/1
25 TABLET, FILM COATED ORAL DAILY
Qty: 45 TABLET | Refills: 3 | Status: SHIPPED | OUTPATIENT
Start: 2024-07-31

## 2024-07-31 NOTE — TELEPHONE ENCOUNTER
Refill passed per [facility] protocol.  Requested Prescriptions   Pending Prescriptions Disp Refills    METOPROLOL TARTRATE 50 MG Oral Tab [Pharmacy Med Name: METOPROLOL TARTRATE 50MG TABLETS] 45 tablet 0     Sig: TAKE 1/2 TABLET(25 MG) BY MOUTH DAILY       Hypertension Medications Protocol Passed - 7/27/2024  5:36 AM        Passed - CMP or BMP in past 12 months        Passed - Last BP reading less than 140/90     BP Readings from Last 1 Encounters:   07/11/24 110/68               Passed - In person appointment or virtual visit in the past 12 mos or appointment in next 3 mos     Recent Outpatient Visits              2 weeks ago Encounter for Medicare annual wellness exam    [address] Kristal Maldonado MD    Office Visit    1 month ago Right leg swelling    [address] APRN    Office Visit    4 months ago Chronic joint pain    [address] MD    Office Visit    4 months ago Idiopathic chronic gout with tophus, unspecified site    [facility]    8 months ago Eosinophilic asthma (HCC)    [address] Kristal Maldonado MD    Office Visit          Future Appointments         Provider Department Appt Notes    In 2 months Thad Omalley MD [facility] 7mo                    Passed - EGFRCR or GFRNAA > 50     GFR Evaluation  EGFRCR: 70 , resulted on 7/2/2024             Future Appointments         Provider Department Appt Notes    In 2 months Thad Omalley MD [facility] 7mo          Recent Outpatient Visits              2 weeks ago Encounter for Medicare annual wellness exam    [address] Kristal Maldonado MD    Office  Visit    1 month ago Right leg swelling    [facility], [address] [place] [address], Reba Loza APRN    Office Visit    4 months ago Chronic joint pain    [facility], [address] [place] [address], Kristal Maldonado MD    Office Visit    4 months ago Idiopathic chronic gout with tophus, unspecified site    [facility], [facility], Thad Viramontes MD    Office Visit    8 months ago Eosinophilic asthma (HCC)    [facility], [address] [place] [address], Kristal Maldonado MD    Office Visit

## 2024-08-01 RX ORDER — ROSUVASTATIN CALCIUM 20 MG/1
20 TABLET, COATED ORAL NIGHTLY
Qty: 90 TABLET | Refills: 3 | Status: SHIPPED | OUTPATIENT
Start: 2024-08-01

## 2024-08-01 NOTE — TELEPHONE ENCOUNTER
Refill Per Protocol     Requested Prescriptions   Pending Prescriptions Disp Refills    ROSUVASTATIN 20 MG Oral Tab [Pharmacy Med Name: ROSUVASTATIN 20MG TABLETS] 90 tablet 0     Sig: TAKE 1 TABLET(20 MG) BY MOUTH EVERY NIGHT       Cholesterol Medication Protocol Passed - 7/29/2024  7:00 AM        Passed - ALT < 80     Lab Results   Component Value Date    ALT 39 07/02/2024             Passed - ALT resulted within past year        Passed - Lipid panel within past 12 months     Lab Results   Component Value Date    CHOLEST 155 07/02/2024    TRIG 115 07/02/2024    HDL 41 07/02/2024    LDL 93 07/02/2024    VLDL 19 07/02/2024    TCHDLRATIO 3.91 11/13/2017    NONHDLC 114 07/02/2024             Passed - In person appointment or virtual visit in the past 12 mos or appointment in next 3 mos     Recent Outpatient Visits              3 weeks ago Encounter for Medicare annual wellness exam    [facility], [address] [place], [place] [address]Evelyn Tina, MD    Office Visit    1 month ago Right leg swelling    [address]    Office Visit    4 months ago Chronic joint pain    [address]    Office Visit    4 months ago Idiopathic chronic gout with tophus, unspecified site    [facility]    [facility]    8 months ago Eosinophilic asthma (HCC)    [address]    Office Visit          Future Appointments         Provider Department Appt Notes    In 2 months Thad Omalley MD [facility] 7mo                           Future Appointments         Provider Department Appt Notes    In 2 months Thad Omalley MD [facility] 7mo          Recent Outpatient Visits              3 weeks ago Encounter for  Medicare annual wellness exam    [facility], [address] [place] [address], Kristal Maldonado MD    Office Visit    1 month ago Right leg swelling    [facility], [address] [place] [address], Reba Loza APRN    Office Visit    4 months ago Chronic joint pain    [facility], [address] [place] [address], Kristal Maldonado MD    Office Visit    4 months ago Idiopathic chronic gout with tophus, unspecified site    [facility], [facility], Thad Viramontes MD    Office Visit    8 months ago Eosinophilic asthma (HCC)    [facility], [address] [place] [address], Kristal Maldonado MD    Office Visit

## 2024-08-12 ENCOUNTER — TELEPHONE (OUTPATIENT)
Dept: INTERNAL MEDICINE CLINIC | Facility: CLINIC | Age: 69
End: 2024-08-12

## 2024-08-12 NOTE — TELEPHONE ENCOUNTER
Patient called Dr. Roa office but she is not taking new patients right now-see anyone in the office or a different office?

## 2024-08-13 NOTE — TELEPHONE ENCOUNTER
She already has appt with another doc in the office there so no new recommendations at this time, please close if nothing further

## 2024-08-14 ENCOUNTER — OFFICE VISIT (OUTPATIENT)
Facility: LOCATION | Age: 69
End: 2024-08-14
Payer: MEDICARE

## 2024-08-14 VITALS — OXYGEN SATURATION: 96 % | HEART RATE: 73 BPM | TEMPERATURE: 97 F

## 2024-08-14 DIAGNOSIS — I83.891 VARICOSE VEINS OF LEG WITH EDEMA, RIGHT: Primary | ICD-10-CM

## 2024-08-14 PROCEDURE — 99202 OFFICE O/P NEW SF 15 MIN: CPT | Performed by: STUDENT IN AN ORGANIZED HEALTH CARE EDUCATION/TRAINING PROGRAM

## 2024-08-15 ENCOUNTER — NURSE TRIAGE (OUTPATIENT)
Dept: INTERNAL MEDICINE CLINIC | Facility: CLINIC | Age: 69
End: 2024-08-15

## 2024-08-15 ENCOUNTER — APPOINTMENT (OUTPATIENT)
Dept: ULTRASOUND IMAGING | Facility: HOSPITAL | Age: 69
End: 2024-08-15
Attending: EMERGENCY MEDICINE
Payer: MEDICARE

## 2024-08-15 ENCOUNTER — HOSPITAL ENCOUNTER (OUTPATIENT)
Age: 69
Discharge: ANOTHER HOSPITAL | End: 2024-08-15
Payer: MEDICARE

## 2024-08-15 ENCOUNTER — HOSPITAL ENCOUNTER (EMERGENCY)
Facility: HOSPITAL | Age: 69
Discharge: HOME | End: 2024-08-15
Attending: EMERGENCY MEDICINE
Payer: MEDICARE

## 2024-08-15 VITALS
WEIGHT: 180 LBS | OXYGEN SATURATION: 95 % | RESPIRATION RATE: 15 BRPM | TEMPERATURE: 98 F | BODY MASS INDEX: 28.93 KG/M2 | HEART RATE: 56 BPM | DIASTOLIC BLOOD PRESSURE: 73 MMHG | SYSTOLIC BLOOD PRESSURE: 136 MMHG | HEIGHT: 66 IN

## 2024-08-15 VITALS
HEART RATE: 70 BPM | SYSTOLIC BLOOD PRESSURE: 106 MMHG | RESPIRATION RATE: 18 BRPM | TEMPERATURE: 97 F | DIASTOLIC BLOOD PRESSURE: 63 MMHG | OXYGEN SATURATION: 96 %

## 2024-08-15 DIAGNOSIS — R55 SYNCOPE AND COLLAPSE: Primary | ICD-10-CM

## 2024-08-15 DIAGNOSIS — Z87.898 HISTORY OF SYNCOPE: ICD-10-CM

## 2024-08-15 DIAGNOSIS — M79.89 LEG SWELLING: Primary | ICD-10-CM

## 2024-08-15 LAB
ALBUMIN SERPL-MCNC: 4.6 G/DL (ref 3.2–4.8)
ALBUMIN/GLOB SERPL: 2.2 {RATIO} (ref 1–2)
ALP LIVER SERPL-CCNC: 90 U/L
ALT SERPL-CCNC: 28 U/L
ANION GAP SERPL CALC-SCNC: <0 MMOL/L (ref 0–18)
AST SERPL-CCNC: 32 U/L (ref ?–34)
ATRIAL RATE: 59 BPM
ATRIAL RATE: 63 BPM
BASOPHILS # BLD AUTO: 0.03 X10(3) UL (ref 0–0.2)
BASOPHILS NFR BLD AUTO: 0.6 %
BILIRUB SERPL-MCNC: 0.7 MG/DL (ref 0.2–1.1)
BUN BLD-MCNC: 19 MG/DL (ref 9–23)
CALCIUM BLD-MCNC: 10.2 MG/DL (ref 8.7–10.4)
CHLORIDE SERPL-SCNC: 105 MMOL/L (ref 98–112)
CO2 SERPL-SCNC: 32 MMOL/L (ref 21–32)
CREAT BLD-MCNC: 0.91 MG/DL
EGFRCR SERPLBLD CKD-EPI 2021: 67 ML/MIN/1.73M2 (ref 60–?)
EOSINOPHIL # BLD AUTO: 0.06 X10(3) UL (ref 0–0.7)
EOSINOPHIL NFR BLD AUTO: 1.2 %
ERYTHROCYTE [DISTWIDTH] IN BLOOD BY AUTOMATED COUNT: 13.3 %
GLOBULIN PLAS-MCNC: 2.1 G/DL (ref 2–3.5)
GLUCOSE BLD-MCNC: 85 MG/DL (ref 70–99)
HCT VFR BLD AUTO: 41.8 %
HGB BLD-MCNC: 13.4 G/DL
IMM GRANULOCYTES # BLD AUTO: 0.02 X10(3) UL (ref 0–1)
IMM GRANULOCYTES NFR BLD: 0.4 %
LYMPHOCYTES # BLD AUTO: 1.64 X10(3) UL (ref 1–4)
LYMPHOCYTES NFR BLD AUTO: 32.3 %
MCH RBC QN AUTO: 29.4 PG (ref 26–34)
MCHC RBC AUTO-ENTMCNC: 32.1 G/DL (ref 31–37)
MCV RBC AUTO: 91.7 FL
MONOCYTES # BLD AUTO: 0.62 X10(3) UL (ref 0.1–1)
MONOCYTES NFR BLD AUTO: 12.2 %
NEUTROPHILS # BLD AUTO: 2.71 X10 (3) UL (ref 1.5–7.7)
NEUTROPHILS # BLD AUTO: 2.71 X10(3) UL (ref 1.5–7.7)
NEUTROPHILS NFR BLD AUTO: 53.3 %
OSMOLALITY SERPL CALC.SUM OF ELEC: 282 MOSM/KG (ref 275–295)
P AXIS: -5 DEGREES
P AXIS: 1 DEGREES
P-R INTERVAL: 182 MS
P-R INTERVAL: 186 MS
PLATELET # BLD AUTO: 199 10(3)UL (ref 150–450)
POTASSIUM SERPL-SCNC: 4.7 MMOL/L (ref 3.5–5.1)
PROT SERPL-MCNC: 6.7 G/DL (ref 5.7–8.2)
Q-T INTERVAL: 394 MS
Q-T INTERVAL: 408 MS
QRS DURATION: 76 MS
QRS DURATION: 80 MS
QTC CALCULATION (BEZET): 403 MS
QTC CALCULATION (BEZET): 403 MS
R AXIS: 20 DEGREES
R AXIS: 37 DEGREES
RBC # BLD AUTO: 4.56 X10(6)UL
SODIUM SERPL-SCNC: 135 MMOL/L (ref 136–145)
T AXIS: 53 DEGREES
T AXIS: 59 DEGREES
TROPONIN I SERPL HS-MCNC: 4 NG/L
VENTRICULAR RATE: 59 BPM
VENTRICULAR RATE: 63 BPM
WBC # BLD AUTO: 5.1 X10(3) UL (ref 4–11)

## 2024-08-15 PROCEDURE — 99284 EMERGENCY DEPT VISIT MOD MDM: CPT

## 2024-08-15 PROCEDURE — 84484 ASSAY OF TROPONIN QUANT: CPT | Performed by: EMERGENCY MEDICINE

## 2024-08-15 PROCEDURE — 93000 ELECTROCARDIOGRAM COMPLETE: CPT | Performed by: NURSE PRACTITIONER

## 2024-08-15 PROCEDURE — 99215 OFFICE O/P EST HI 40 MIN: CPT | Performed by: NURSE PRACTITIONER

## 2024-08-15 PROCEDURE — 85025 COMPLETE CBC W/AUTO DIFF WBC: CPT

## 2024-08-15 PROCEDURE — 93010 ELECTROCARDIOGRAM REPORT: CPT

## 2024-08-15 PROCEDURE — 36415 COLL VENOUS BLD VENIPUNCTURE: CPT

## 2024-08-15 PROCEDURE — 85025 COMPLETE CBC W/AUTO DIFF WBC: CPT | Performed by: EMERGENCY MEDICINE

## 2024-08-15 PROCEDURE — 80053 COMPREHEN METABOLIC PANEL: CPT | Performed by: EMERGENCY MEDICINE

## 2024-08-15 PROCEDURE — 93005 ELECTROCARDIOGRAM TRACING: CPT

## 2024-08-15 PROCEDURE — 80053 COMPREHEN METABOLIC PANEL: CPT

## 2024-08-15 PROCEDURE — 99285 EMERGENCY DEPT VISIT HI MDM: CPT

## 2024-08-15 PROCEDURE — 93971 EXTREMITY STUDY: CPT | Performed by: EMERGENCY MEDICINE

## 2024-08-15 NOTE — ED PROVIDER NOTES
Patient Seen in: [facility] Emergency Department      History     Chief Complaint   Patient presents with    Syncope     Stated Complaint: syncopy and weakness    Subjective:   HPI    69-year-old female history of asthma dyslipidemia hypertension among other medical problems presents ED for evaluation from immediate care for syncope.  She actually had a syncopal episode on Monday, about 3 days ago when she stood up from a chair hit the side of her head and left arm.  The following days seemed fine and then followed up with a physician regarding her varicose veins.  She reports she has had that he has varicose veins in the right leg for months.  Right leg has been swollen for months.  She expressed a syncopal episode was advised to come to the ER.  No chest pain no shortness of breath.  No blood per orifice.  She did not hit her head.  no vomiting diarrhea.  No focal motor or sensory gait vision speech problems.  No abdominal pain flank pain.  No seizure activity no tongue biting no incontinence.  No other associated symptoms or complaints.    Objective:   Past Medical History:    Acute midline low back pain without sciatica    Acute postoperative pain of right shoulder    Asthma (HCC)    Atherosclerosis of aorta (HCC)    CXR - 3/14/19    Basal cell carcinoma (BCC)    Nose    BRCA1 negative    BRCA2 negative    Cancer screening    [employer] Common Hereditary Cancers Panel = Negative for 47 genes.     Cellulitis of hand, left    Chondromalacia of patella    Chronic right shoulder pain    Colon polyps    scope    COVID    recved IV treatment , NO Residual effects.  ASTHMA FLAIR, COUGH FEVERS. RESOLVED    Decreased GFR    GFR 58 - 10/18/18     Depression    resolved, takes Duloxetine for hot flashes     Dyslipidemia    Endometriosis    Very painful periods. Was suspected to have endometriosis.     Endometriosis    Focal endometriosis on path from hysterectomy 6/2022. At time of surgery, findings consistent with  scarring from probable stage 3/4 endometriosis.     Eosinophilic asthma (HCC)    Essential hypertension    Fall    Family history of heart disease    Ganglion of right wrist    OR by     Head injury    High blood pressure    High cholesterol    HTN (hypertension)    Hyperlipidemia    Hypothyroidism    Inguinal hernia    OR    Lichen sclerosus of vulva    biopsy proven    Menopausal symptoms    Menopause    age 46.     Nontraumatic complete tear of right rotator cuff    Nontraumatic rupture of right long head biceps tendon    Obesity    Obesity (BMI 30-39.9)    MELISSA (obstructive sleep apnea)    AHI-7    Pap smear for cervical cancer screening    Pap negative     Plantar fasciitis    left    Prediabetes    Right knee meniscal tear    OR-[facility]ard    SAH (subarachnoid hemorrhage) (HCC)    Serous cystadenoma of right ovary    BSO performed. Pelvic washings benign    CHERELLE (stress urinary incontinence, female)    Had urogyn eval with Dr. Wagoner. Was told she holds her bladder a lot & that she is not a sling candidate.     Tendonitis of ankle, left              Past Surgical History:   Procedure Laterality Date    Arthroscopy of joint unlisted Right     shoulder     Biopsy vulva/perineum,addnl lesn Bilateral 07/25/2022    Lichen Sclerosus of vulva. Two sites on vulva biopsied. Dr. Chari Spencer    Colonoscopy  2002    Colonoscopy  6/28/2013    Procedure: COLONOSCOPY;  Surgeon: Mary Ann Candelaria MD;  Location: [facility] ENDOSCOPY    Excis primary ganglion wrist      on right by Fernie    Hc endometrial sampling w or wo endocerv sampling  03/07/2022    EMB done for borderline thickened endometrium, complex ovarian cyst. Sounded 6.5 cm. Path Insufficient. Dr. Chari Spencer     Hernia surgery  1998    right inguinal    Hernia surgery  12/03/2021    ROBOTIC ASSISTED REPAIR OF UMBILICAL HERNIA WITH MESH    Hysterectomy  06/24/2022    Robot assisted total laparoscopic hysterectomy, bilateral salpingo-oophorectomy, pelvic washings,  cystoscopy, lysis of adhesions, and bilateral ureterolysis. Path: Benign serous cystadenoma of right ovary. Focal endometriosis. Dr. Chari Spencer, [facility].     Knee arthroscopy  2009    right knee torn meniscus    Oophorectomy Bilateral 2022    right ovary - benign serous cystadenoma    Other  1989    ? burning/cutting of cervix after abnormal pap. Approx date. Exact procedure not known.     Salpingectomy Bilateral 2022    at time of hysterectomy    Skin surgery  10/26/2020    BCC Left nasal ala mohs by Dr Wang                Social History     Socioeconomic History    Marital status:      Spouse name: Sean    Number of children: 2   Occupational History     Employer: "[employer]   Tobacco Use    Smoking status: Former     Current packs/day: 0.00     Average packs/day: 1.5 packs/day for 15.6 years (23.4 ttl pk-yrs)     Types: Cigarettes     Start date: 1970     Quit date: 1985     Years since quittin.9     Passive exposure: Never    Smokeless tobacco: Former   Vaping Use    Vaping status: Never Used   Substance and Sexual Activity    Alcohol use: Yes     Comment: monthly     Drug use: No    Sexual activity: Not Currently     Partners: Male     Birth control/protection: Hysterectomy   Other Topics Concern    Caffeine Concern Yes     Comment: 1 cup of coffee    Exercise Yes     Comment: walking              Review of Systems    Positive for stated Chief Complaint: Syncope    Other systems are as noted in HPI.  Constitutional and vital signs reviewed.      All other systems reviewed and negative except as noted above.    Physical Exam     ED Triage Vitals [08/15/24 1141]   /67   Pulse 60   Resp 12   Temp 97.9 °F (36.6 °C)   Temp src Oral   SpO2 99 %   O2 Device None (Room air)       Current Vitals:   Vital Signs  BP: 136/73  Pulse: 56  Resp: 15  Temp: 97.9 °F (36.6 °C)  Temp src: Oral  MAP (mmHg): 92    Oxygen Therapy  SpO2: 95 %  O2 Device: None (Room  air)            Physical Exam  Vitals and nursing note reviewed.   Constitutional:       General: She is not in acute distress.     Appearance: She is well-developed. She is not toxic-appearing.   HENT:      Head: Normocephalic and atraumatic.   Eyes:      General: No scleral icterus.     Conjunctiva/sclera: Conjunctivae normal.   Cardiovascular:      Rate and Rhythm: Normal rate.      Heart sounds: No murmur heard.  Pulmonary:      Effort: Pulmonary effort is normal. No respiratory distress.   Abdominal:      General: There is no distension.   Musculoskeletal:         General: No tenderness. Normal range of motion.      Cervical back: Normal range of motion and neck supple.   Skin:     General: Skin is warm and dry.      Findings: No rash.   Neurological:      Mental Status: She is alert and oriented to person, place, and time.      Motor: No abnormal muscle tone.      Coordination: Coordination normal.   Psychiatric:         Behavior: Behavior normal.              ED Course     Labs Reviewed   COMP METABOLIC PANEL (14) - Abnormal; Notable for the following components:       Result Value    Sodium 135 (*)     Anion Gap <0 (*)     A/G Ratio 2.2 (*)     All other components within normal limits   TROPONIN I HIGH SENSITIVITY - Normal   CBC WITH DIFFERENTIAL WITH PLATELET   RAINBOW DRAW LAVENDER   RAINBOW DRAW LIGHT GREEN   RAINBOW DRAW BLUE     EKG    Rate, intervals and axes as noted on EKG Report.  Rate: 59  Rhythm: Sinus Rhythm  Reading: Sinus bradycardia.  No ischemic changes                           MDM          -Tracing on cardiac monitor and pulse oximetry was reviewed by myself.   -The cardiac monitor revealed normal sinus rhythm as interpreted by me. The cardiac monitor was ordered to monitor the patient for dysrhythmia  -Pulse oximetry was interpreted by me and was normal.  Pulse oximeter was ordered to monitor patient for hypoxia.             -Comorbidities did add complexity to the management are  mentioned in the HPI above        -I personally reviewed the prior external notes and the medical record to obtain additional history I did review patient's notes from last year she follow-up with neurology.  She had a closed head injury and small traumatic subarachnoid hemorrhage back in 2023.  If she has had a residual mild speech deficit at that since that time.        -DDX: Includes but not limited to -syncope, vasovagal, cardiogenic, DVT           -I personally reviewed the US findings and it shows no DVT  Please refer to radiology report for official interpretation        Labs Reviewed   COMP METABOLIC PANEL (14) - Abnormal; Notable for the following components:       Result Value    Sodium 135 (*)     Anion Gap <0 (*)     A/G Ratio 2.2 (*)     All other components within normal limits   TROPONIN I HIGH SENSITIVITY - Normal   CBC WITH DIFFERENTIAL WITH PLATELET   RAINBOW DRAW LAVENDER   RAINBOW DRAW LIGHT GREEN   RAINBOW DRAW BLUE     Labs reviewed no major metabolic disturbance.  EKG troponin negative for any pattern of cardiac injury.  No evidence of any events on cardiac monitoring.        US VENOUS DOPPLER LEG RIGHT - DIAG IMG (CPT=93971)   Final Result   PROCEDURE:  US VENOUS DOPPLER LEG RIGHT - DIAG IMG (CPT=93971)       COMPARISON:  US MATIAS, US VENOUS DOPPLER LEG RIGHT - DIAG IMG    (CPT=93971), 6/24/2024, 2:10 PM.       INDICATIONS:  Right leg swelling       TECHNIQUE:  Real time, grey scale, and duplex ultrasound was used to    evaluate the lower extremity venous system. B-mode two-dimensional images    of the vascular structures, Doppler spectral analysis, and color flow.     Doppler imaging were performed.  The    following veins were imaged:  Common, deep, and superficial femoral,    popliteal, sapheno-femoral junction, posterior tibial veins, and the    contralateral common femoral vein.       PATIENT STATED HISTORY: (As transcribed by Technologist)              FINDINGS:     EXTREMITY  EXAMINED:  Right leg   SAPHENOFEMORAL JUNCTION:  No reflux.   THROMBI:  None visible.   COMPRESSION:  Normal compressibility, phasicity, and augmentation.   OTHER:  Negative.                         =====   CONCLUSION:  No evidence of DVT in the right leg.           LOCATION:  [facility]               Dictated by (CST): Sean Spain MD on 8/15/2024 at 5:59 PM        Finalized by (CST): Sean Spain MD on 8/15/2024 at 6:00 PM               Patient is now orthostatic vital stable no events here in the ED.  I did notice there is some slight ecchymosis of the left posterior upper arm but she has no tenderness and range of motion is normal.  Will hold off on x-ray imaging.  I discussed with [facility] cardiology.  I feel patient is low risk for outpatient workup.  Patient agrees.  I have asked [facility] cardiology for expedited appointment.  Patient will be contacted tomorrow.  Findings discussed with patient shared decision making employed patient discharged home in stable condition.                                 Medical Decision Making      Disposition and Plan     Clinical Impression:  1. Syncope and collapse         Disposition:  Discharge  8/15/2024  6:21 pm    Follow-up:  Uziel Lange MD  [address] [place] [id number]  [phone]    Schedule an appointment as soon as possible for a visit            Medications Prescribed:  Current Discharge Medication List

## 2024-08-15 NOTE — ED INITIAL ASSESSMENT (HPI)
Patient  was sitting in a chair and felt dizzy- got up and walked to her kitchen island and her knees started to shake, dizzy, and had a syncope episode.    Patient states hit her arm and guarded her head.  Denies any chest pain or SOB  Family history of cardiac history

## 2024-08-15 NOTE — ED INITIAL ASSESSMENT (HPI)
Patient has chronic right leg swelling, is supposed to have vein surgery. Only reason she came in today is because vein doctor told her to yesterday. On Monday, sh went out for a walk and came back, sat down on the couch and went to stand up and she felt wobbly and grabbed onto the island in the kitchen. States her knees started shaking and she had a couple second loss of consciousness. \"I just went down.\" States she just doesn't \"feel right\" since then.

## 2024-08-15 NOTE — TELEPHONE ENCOUNTER
Action Requested: Summary for Provider     []  Critical Lab, Recommendations Needed  [] Need Additional Advice  []   FYI    []   Need Orders  [] Need Medications Sent to Pharmacy  []  Other     SUMMARY: Patient reports taking a walk 3 days ago, came home and after sitting for a few minutes, she got up to walk to the kitchen, felt lightheaded and fainted. Per spouse, she lost consciousness momentarily and was able to get up on her own. She thinks her head landed in the crook of her arm, scraped arm on chair on island, arm is bruised. Patient reports she is exercising more but admits she does not drink enough fluids. Advised patient to go to IC for evaluation of syncope. Patient is agreeable to plan and will go. [facility] location provided to patient.     Reason for call: Fall  Onset: 3 days    Advised patient to drink more fluids.     Reason for Disposition   Age > 50 years    Protocols used: [id number]

## 2024-08-15 NOTE — PLAN OF CARE
Progress Note  Sarah Mejia Patient Status:  Emergency    1955 MRN [id number]   Location [facility] EMERGENCY DEPARTMENT Attending No att. providers found   Hosp Day # 0 PCP Kristal Valdez MD     Paged by Dr. Ching, ED Physician    HPI:  69 year old female with PMH of HTN, HLD, PVD, hypothyroidism, asthma, and SAH who presented to the ED from immediate care for evaluation of syncope. Pt reported having a syncopal episode a few days ago while getting up from a chair. Pt reported hitting her head and left arm. Pt denied chest pain, dyspnea or any other symptoms. Cardia work up in ED was negative for acute findings.       Labs:  Troponin negative      Diagnostics:   EKG: Sinus rhythm. No acute ischemic changes  RLE venous doppler negative for SVT        Assessment/Plan:    - Disposition as per ED physician  - Discharge home for outpatient cardiology follow up         NURA Ordonez  [facility]  2024  2:53 AM

## 2024-08-15 NOTE — ED PROVIDER NOTES
Patient Seen in: [facility]      History     Chief Complaint   Patient presents with    Dizziness     Stated Complaint: left arm pain due to fall  x monday    Subjective:   HPI  69 year  old female with asthma, dyslipidemia, HTN and varicose veins presents today with c/o of having a syncopal episode on Monday.  Pt states she returned from a walk, stood up from her chair and passed out hitting the side of her head and left arm. Pt states Tuesday she did nothing and Wednesday she went to see a surgeon about her varicosities and was advised to go to the ER. She states her family and doctor advised her to seek treatment. Pt states a couple months ago her right thigh is swollen and she had an US and it was negative to the lower extremity. Pt denies any CP or SOB at this time.       Objective:   Past Medical History:    Acute midline low back pain without sciatica    Acute postoperative pain of right shoulder    Asthma (HCC)    Atherosclerosis of aorta (HCC)    CXR - 3/14/19    Basal cell carcinoma (BCC)    Nose    BRCA1 negative    BRCA2 negative    Cancer screening    Inv[employer]ae Common Hereditary Cancers Panel = Negative for 47 genes.     Cellulitis of hand, left    Chondromalacia of patella    Chronic right shoulder pain    Colon polyps    scope    COVID    recved IV treatment , NO Residual effects.  ASTHMA FLAIR, COUGH FEVERS. RESOLVED    Decreased GFR    GFR 58 - 10/18/18     Depression    resolved, takes Duloxetine for hot flashes     Dyslipidemia    Endometriosis    Very painful periods. Was suspected to have endometriosis.     Endometriosis    Focal endometriosis on path from hysterectomy 6/2022. At time of surgery, findings consistent with scarring from probable stage 3/4 endometriosis.     Eosinophilic asthma (HCC)    Essential hypertension    Fall    Family history of heart disease    Ganglion of right wrist    OR by     Head injury    High blood pressure    High cholesterol    HTN  (hypertension)    Hyperlipidemia    Hypothyroidism    Inguinal hernia    OR    Lichen sclerosus of vulva    biopsy proven    Menopausal symptoms    Menopause    age 46.     Nontraumatic complete tear of right rotator cuff    Nontraumatic rupture of right long head biceps tendon    Obesity    Obesity (BMI 30-39.9)    MELISSA (obstructive sleep apnea)    AHI-7    Pap smear for cervical cancer screening    Pap negative     Plantar fasciitis    left    Prediabetes    Right knee meniscal tear    OR-Goddard    SAH (subarachnoid hemorrhage) (HCC)    Serous cystadenoma of right ovary    BSO performed. Pelvic washings benign    CHERELLE (stress urinary incontinence, female)    Had urogyn eval with Dr. Wagoner. Was told she holds her bladder a lot & that she is not a sling candidate.     Tendonitis of ankle, left              Past Surgical History:   Procedure Laterality Date    Arthroscopy of joint unlisted Right     shoulder     Biopsy vulva/perineum,addnl lesn Bilateral 07/25/2022    Lichen Sclerosus of vulva. Two sites on vulva biopsied. Dr. Chari Spencer    Colonoscopy  2002    Colonoscopy  6/28/2013    Procedure: COLONOSCOPY;  Surgeon: Mary Ann Candelaria MD;  Location: [facility]    Excis primary ganglion wrist      on right by Fernie    Hc endometrial sampling w or wo endocerv sampling  03/07/2022    EMB done for borderline thickened endometrium, complex ovarian cyst. Sounded 6.5 cm. Path Insufficient. Dr. Chari Spencer     Hernia surgery  1998    right inguinal    Hernia surgery  12/03/2021    ROBOTIC ASSISTED REPAIR OF UMBILICAL HERNIA WITH MESH    Hysterectomy  06/24/2022    Robot assisted total laparoscopic hysterectomy, bilateral salpingo-oophorectomy, pelvic washings, cystoscopy, lysis of adhesions, and bilateral ureterolysis. Path: Benign serous cystadenoma of right ovary. Focal endometriosis. Dr. Chari Spencer, [facility].     Knee arthroscopy  2009    right knee torn meniscus    Oophorectomy Bilateral 06/24/2022     right ovary - benign serous cystadenoma    Other      ? burning/cutting of cervix after abnormal pap. Approx date. Exact procedure not known.     Salpingectomy Bilateral 2022    at time of hysterectomy    Skin surgery  10/26/2020    BCC Left nasal ala mohs by Dr Wang                Social History     Socioeconomic History    Marital status:      Spouse name: Sean    Number of children: 2   Occupational History     Employer: [employer]   Tobacco Use    Smoking status: Former     Current packs/day: 0.00     Average packs/day: 1.5 packs/day for 15.6 years (23.4 ttl pk-yrs)     Types: Cigarettes     Start date: 1970     Quit date: 1985     Years since quittin.9     Passive exposure: Never    Smokeless tobacco: Former   Vaping Use    Vaping status: Never Used   Substance and Sexual Activity    Alcohol use: Yes     Comment: monthly     Drug use: No    Sexual activity: Not Currently     Partners: Male     Birth control/protection: Hysterectomy   Other Topics Concern    Caffeine Concern Yes     Comment: 1 cup of coffee    Exercise Yes     Comment: walking              Review of Systems   Constitutional:  Positive for fatigue.   HENT: Negative.     Eyes: Negative.    Respiratory: Negative.     Cardiovascular:  Positive for leg swelling.   Gastrointestinal: Negative.    Endocrine: Negative.    Genitourinary: Negative.    Musculoskeletal: Negative.    Skin: Negative.    Allergic/Immunologic: Negative.    Neurological:  Positive for syncope.   Hematological: Negative.    Psychiatric/Behavioral: Negative.         Positive for stated Chief Complaint: Dizziness    Other systems are as noted in HPI.  Constitutional and vital signs reviewed.      All other systems reviewed and negative except as noted above.    Physical Exam     ED Triage Vitals [08/15/24 1011]   /63   Pulse 70   Resp 18   Temp 97 °F (36.1 °C)   Temp src Temporal   SpO2 96 %   O2 Device None (Room air)       Current Vitals:    Vital Signs  BP: 106/63  Pulse: 70  Resp: 18  Temp: 97 °F (36.1 °C)  Temp src: Temporal    Oxygen Therapy  SpO2: 96 %  O2 Device: None (Room air)            Physical Exam  Vitals reviewed.   Constitutional:       Appearance: She is well-developed and normal weight.   HENT:      Head: Normocephalic.   Eyes:      General: No visual field deficit.     Extraocular Movements: Extraocular movements intact.      Pupils: Pupils are equal, round, and reactive to light.   Cardiovascular:      Rate and Rhythm: Normal rate and regular rhythm.      Heart sounds: Normal heart sounds.   Pulmonary:      Effort: Pulmonary effort is normal.      Breath sounds: Normal breath sounds.   Musculoskeletal:      Cervical back: Normal range of motion and neck supple.   Skin:     Comments: Large ecchymosis noted under left upper arm.    Neurological:      Mental Status: She is alert and oriented to person, place, and time.      GCS: GCS eye subscore is 4. GCS verbal subscore is 5. GCS motor subscore is 6.      Cranial Nerves: No cranial nerve deficit, dysarthria or facial asymmetry.      Motor: No weakness.      Gait: Gait normal.   Psychiatric:         Mood and Affect: Mood normal.             ED Course   Labs Reviewed - No data to display  EKG    Rate, intervals and axes as noted on EKG Report.  Rate: 63  Rhythm: Sinus Rhythm  Reading: NSR                          MDM      69 year  old female with asthma, dyslipidemia, HTN and varicose veins presents today with c/o of having a syncopal episode on Monday.  Pt states she returned from a walk, stood up from her chair and passed out hitting the side of her head and left arm. Pt states Tuesday she did nothing and Wednesday she went to see a surgeon about her varicosities and was advised to go to the ER. She states her family and doctor advised her to seek treatment. Pt states a couple months ago her right thigh is swollen and she had an US and it was negative to the lower extremity. Pt denies  any CP or SOB at this time.   VS: See EMR  Physical exam: See exam     Heart Score:    HEART Score      Title      Criteria Score   Age: 65 and older Age Score: 2   History: Mod Suspicious Hx Score: 1     HTN: Yes   Hypercholesterolemia: Yes   Atherosclerosis/PVD: No     DM: No   BMI>30kg/m2: Yes   Smoking: No   Family History: Yes         Other Risk Factor Score: 4                     HEART Score: 5        Risk of adverse cardiac event is 12-16.6%          Modified NIH: 0  Wells Score: 1 Moderate risk  Differential Diag: DVT, Caridiac event, PE, Stroke.   Will diagnose with swollen leg and hx of syncope. Pt is being referred to the ER. Pts  will drive her to the ER to rule out a cardiac event, blood clot or stroke. ED attending notified.   Report given to ED charge nurse at [place].  Patient's  is present and will drive her to the emergency room.                               Medical Decision Making  69 year  old female with asthma, dyslipidemia, HTN and varicose veins presents today with c/o of having a syncopal episode on Monday.  Pt states she returned from a walk, stood up from her chair and passed out hitting the side of her head and left arm. Pt states Tuesday she did nothing and Wednesday she went to see a surgeon about her varicosities and was advised to go to the ER. She states her family and doctor advised her to seek treatment. Pt states a couple months ago her right thigh is swollen and she had an US and it was negative to the lower extremity. Pt denies any CP or SOB at this time.       Problems Addressed:  History of syncope: acute illness or injury  Leg swelling: acute illness or injury    Amount and/or Complexity of Data Reviewed  External Data Reviewed: radiology.  ECG/medicine tests: ordered. Decision-making details documented in ED Course.    Risk  Decision regarding hospitalization.        Disposition and Plan     Clinical Impression:  1. Leg swelling    2. History of syncope          Disposition:  Ic to ed  8/15/2024 10:40 am    Follow-up:  No follow-up provider specified.        Medications Prescribed:  Current Discharge Medication List

## 2024-08-19 ENCOUNTER — TELEPHONE (OUTPATIENT)
Facility: LOCATION | Age: 69
End: 2024-08-19

## 2024-08-19 DIAGNOSIS — R23.2 HOT FLASHES: ICD-10-CM

## 2024-08-19 DIAGNOSIS — M25.50 CHRONIC JOINT PAIN: ICD-10-CM

## 2024-08-19 DIAGNOSIS — G89.29 CHRONIC JOINT PAIN: ICD-10-CM

## 2024-08-19 NOTE — TELEPHONE ENCOUNTER
Patient called for refill of Duloxetine. She states the prescription was originally written for 2 capsules daily, lately pharmacy has only be filling for one capsule a day. Patient is out of medication. Takes it for hot flashes and gout pain. Rx pended.

## 2024-08-19 NOTE — TELEPHONE ENCOUNTER
Patient says Dr. Michael gave her a script for compression garment and she misplaced it. She is asking if she can come  a new script.     Please advise   # [phone]

## 2024-08-21 RX ORDER — DULOXETIN HYDROCHLORIDE 60 MG/1
120 CAPSULE, DELAYED RELEASE ORAL DAILY
Qty: 60 CAPSULE | Refills: 2 | Status: SHIPPED | OUTPATIENT
Start: 2024-08-21

## 2024-08-21 NOTE — TELEPHONE ENCOUNTER
Refill passed per [facility] protocol but unable to refill due to high level interaction warning.     Requested Prescriptions   Pending Prescriptions Disp Refills    DULoxetine 60 MG Oral Cap DR Particles 60 capsule 2     Sig: Take 2 capsules (120 mg total) by mouth daily.       Psychiatric Non-Scheduled (Anti-Anxiety) Passed - 8/19/2024  9:33 AM        Passed - In person appointment or virtual visit in the past 6 mos or appointment in next 3 mos     Recent Outpatient Visits              1 week ago Varicose veins of leg with edema, right    [facility], [facility],Maci Landry MD    Office Visit    1 month ago Encounter for Medicare annual wellness exam    [address] Kristal Maldonado MD    Office Visit    1 month ago Right leg swelling    [address] Reba Loza APRN    Office Visit    4 months ago Chronic joint pain    [address] Kristal Maldonado MD    Office Visit    5 months ago Idiopathic chronic gout with tophus, unspecified site    [facility] Thad Omalley MD    Office Visit          Future Appointments         Provider Department Appt Notes    In 1 month Maci Michael MD [facility], [facility] EP - R leg varicose veins, f/u after US    In 1 month Thad Omalley MD [facility] 7mo                    Passed - Depression Screening completed within the past 12 months             @[email or web address]@      @[email or web address]@

## 2024-08-31 DIAGNOSIS — E03.9 HYPOTHYROIDISM, UNSPECIFIED TYPE: ICD-10-CM

## 2024-09-03 RX ORDER — LEVOTHYROXINE SODIUM 75 UG/1
75 TABLET ORAL
Qty: 90 TABLET | Refills: 3 | Status: SHIPPED | OUTPATIENT
Start: 2024-09-03

## 2024-09-03 NOTE — TELEPHONE ENCOUNTER
REFILL PASSED PER [facility] PROTOCOLS    Requested Prescriptions   Pending Prescriptions Disp Refills    LEVOTHYROXINE 75 MCG Oral Tab [Pharmacy Med Name: LEVOTHYROXINE 0.075MG (75MCG) TABS] 90 tablet 0     Sig: TAKE 1 TABLET(75 MCG) BY MOUTH BEFORE BREAKFAST       Thyroid Medication Protocol Passed - 8/31/2024  2:31 PM        Passed - TSH in past 12 months        Passed - Last TSH value is normal     Lab Results   Component Value Date    TSH 3.190 07/02/2024                 Passed - In person appointment or virtual visit in the past 12 mos or appointment in next 3 mos     Recent Outpatient Visits              2 weeks ago Varicose veins of leg with edema, right    [facility]    Office Visit    1 month ago Encounter for Medicare annual wellness exam    [facility], [address] [place] [address] Kristal Maldonado MD    Office Visit    2 months ago Right leg swelling    [facility], [address] [place] [address], Reba Loza APRN    Office Visit    5 months ago Chronic joint pain    [address] Kristal Maldonado MD    Office Visit    5 months ago Idiopathic chronic gout with tophus, unspecified site    [facility], [facility] Thad Omalley MD    Office Visit          Future Appointments         Provider Department Appt Notes    In 2 weeks Maci Michael MD [facility], [facility] - R leg varicose veins, f/u after US    In 1 month Thad Omalley MD [facility] 7mo                         Future Appointments         Provider Department Appt Notes    In 2 weeks Maci Michael MD [facility], [facility] - R leg varicose veins, f/u after US    In 1 month Thad Omalley MD [facility] 7mo          Recent  Outpatient Visits              2 weeks ago Varicose veins of leg with edema, right    [facility], [place],Maci Landry MD    Office Visit    1 month ago Encounter for Medicare annual wellness exam    [facility], [address] [place] [address], Kristal Maldonado MD    Office Visit    2 months ago Right leg swelling    [facility], [address] [place] [address], Reba Loza APRN    Office Visit    5 months ago Chronic joint pain    [facility], [address] [place] [address], Kristal Maldonado MD    Office Visit    5 months ago Idiopathic chronic gout with tophus, unspecified site    [facility], [facility], Thad Viramontes MD    Office Visit

## 2024-09-08 NOTE — H&P
Patient ID: Sarah Mejia is a 69 year old female.    Chief Complaint   Patient presents with    New Patient     NP - Varicose veins, right leg swollen, US VENOUS DOPPLER LEG RIGHT - DIAG IMG 6/24/24, bulging, no other symptoms.       HPI: Sarah Mejia is a 69 year old female presents to clinic for evaluation.  For a while, patient has been having pain and swelling in her right lower extremity.  Patient went to her PCP who diagnosed her with varicose veins.  Patient had venous Doppler of her right leg which was negative for any acute DVT.  Patient has used compression socks which helps her edema.  Patient was referred to general surgery for further evaluation and management.    Workup:   US VENOUS DOPPLER LEG RIGHT - DIAG IMG (CPT=93971)    Result Date: 8/15/2024  CONCLUSION:  No evidence of DVT in the right leg.   LOCATION:  [facility]    Dictated by (CST): Sean Spain MD on 8/15/2024 at 5:59 PM     Finalized by (CST): Sean Spain MD on 8/15/2024 at 6:00 PM          Past Medical History  Past Medical History:    Acute midline low back pain without sciatica    Acute postoperative pain of right shoulder    Asthma (HCC)    Atherosclerosis of aorta (HCC)    CXR - 3/14/19    Basal cell carcinoma (BCC)    Nose    BRCA1 negative    BRCA2 negative    Cancer screening    Invitae Common Hereditary Cancers Panel = Negative for 47 genes.     Cellulitis of hand, left    Chondromalacia of patella    Chronic right shoulder pain    Colon polyps    scope    COVID    recved IV treatment , NO Residual effects.  ASTHMA FLAIR, COUGH FEVERS. RESOLVED    Decreased GFR    GFR 58 - 10/18/18     Depression    resolved, takes Duloxetine for hot flashes     Dyslipidemia    Endometriosis    Very painful periods. Was suspected to have endometriosis.     Endometriosis    Focal endometriosis on path from hysterectomy 6/2022. At time of surgery, findings consistent with scarring from probable stage 3/4 endometriosis.     Eosinophilic asthma  (HCC)    Essential hypertension    Fall    Family history of heart disease    Ganglion of right wrist    OR by     Head injury    High blood pressure    High cholesterol    HTN (hypertension)    Hyperlipidemia    Hypothyroidism    Inguinal hernia    OR    Lichen sclerosus of vulva    biopsy proven    Menopausal symptoms    Menopause    age 46.     Nontraumatic complete tear of right rotator cuff    Nontraumatic rupture of right long head biceps tendon    Obesity    Obesity (BMI 30-39.9)    MELISSA (obstructive sleep apnea)    AHI-7    Pap smear for cervical cancer screening    Pap negative     Plantar fasciitis    left    Prediabetes    Right knee meniscal tear    OR-Goddard    SAH (subarachnoid hemorrhage) (HCC)    Serous cystadenoma of right ovary    BSO performed. Pelvic washings benign    CHERELLE (stress urinary incontinence, female)    Had urogyn eval with Dr. Wagoner. Was told she holds her bladder a lot & that she is not a sling candidate.     Tendonitis of ankle, left       Past Surgical History  Past Surgical History:   Procedure Laterality Date    Arthroscopy of joint unlisted Right     shoulder     Biopsy vulva/perineum,addnl lesn Bilateral 07/25/2022    Lichen Sclerosus of vulva. Two sites on vulva biopsied. Dr. Chari Spencer    Colonoscopy  2002    Colonoscopy  6/28/2013    Procedure: COLONOSCOPY;  Surgeon: Mary Ann Candelaria MD;  Location: [facility]    Excis primary ganglion wrist      on right by Fernie    Hc endometrial sampling w or wo endocerv sampling  03/07/2022    EMB done for borderline thickened endometrium, complex ovarian cyst. Sounded 6.5 cm. Path Insufficient. Dr. Chari Spencer     Hernia surgery  1998    right inguinal    Hernia surgery  12/03/2021    ROBOTIC ASSISTED REPAIR OF UMBILICAL HERNIA WITH MESH    Hysterectomy  06/24/2022    Robot assisted total laparoscopic hysterectomy, bilateral salpingo-oophorectomy, pelvic washings, cystoscopy, lysis of adhesions, and bilateral  ureterolysis. Path: Benign serous cystadenoma of right ovary. Focal endometriosis. Dr. Chari Spencer, [facility].     Knee arthroscopy  2009    right knee torn meniscus    Oophorectomy Bilateral 06/24/2022    right ovary - benign serous cystadenoma    Other  1989    ? burning/cutting of cervix after abnormal pap. Approx date. Exact procedure not known.     Salpingectomy Bilateral 06/24/2022    at time of hysterectomy    Skin surgery  10/26/2020    BCC Left nasal ala mohs by Dr Wang       Medications  Current Outpatient Medications   Medication Sig Dispense Refill    rosuvastatin 20 MG Oral Tab Take 1 tablet (20 mg total) by mouth nightly. 90 tablet 3    metoprolol tartrate 50 MG Oral Tab Take 0.5 tablets (25 mg total) by mouth daily. 45 tablet 3    colchicine 0.6 MG Oral Tab Take 1 tablet (0.6 mg total) by mouth daily. 90 tablet 1    allopurinol 300 MG Oral Tab Take 1 pill daily 90 tablet 3    NUCALA 100 MG/ML Subcutaneous Solution Auto-injector       clindamycin 150 MG Oral Cap Take 4 capsules (600 mg total) by mouth as needed. 1 HOUR BEFORE DENTAL APPT      mometasone 0.1 % External Ointment Thin layer to affected area nightly for 4 weeks, then every other night x 4 weeks, twice weekly for 4 weeks. See gynecologist for re-assessment. 45 g 3    Budesonide-Formoterol Fumarate 160-4.5 MCG/ACT Inhalation Aerosol Inhale 2 puffs into the lungs 2 (two) times daily.      Albuterol Sulfate  (90 Base) MCG/ACT Inhalation Aero Soln Inhale 2 puffs into the lungs every 4 (four) hours as needed for Wheezing or Shortness of Breath. 1 Inhaler 3    Tiotropium Bromide Monohydrate (SPIRIVA RESPIMAT) 1.25 MCG/ACT Inhalation Aero Soln Inhale 2 sprays into the lungs daily. 3 Inhaler 2    aspirin 81 MG Oral Chew Tab Chew 1 tablet (81 mg total) by mouth every morning.      Omega-3 Fatty Acids (OMEGA 3 OR) Take 1,000 mg by mouth 2 (two) times daily.        levothyroxine 75 MCG Oral Tab Take 1 tablet (75 mcg total) by mouth  before breakfast. 90 tablet 3    DULoxetine 60 MG Oral Cap DR Particles Take 2 capsules (120 mg total) by mouth daily. 60 capsule 2       Allergies  Allergies   Allergen Reactions    Penicillins HIVES     swelling    Sulfa Antibiotics SWELLING       Social History  History   Smoking Status    Former    Types: Cigarettes   Smokeless Tobacco    Former     History   Alcohol Use    Yes     Comment: monthly      History   Drug Use No       Family History  Family History   Problem Relation Age of Onset    Heart Disease Brother     High Blood Pressure Brother     Cancer Brother 67        2 cancerous growths removed arm    Other (heart diseases) Brother     Heart Disease Brother     High Blood Pressure Brother     Cancer Brother 67        scalp cancerous growth removed    Other (skin cancer) Brother     Diabetes Father     Other (Other) Father     Breast Cancer Mother 60    Heart Disease Mother     Cancer Mother         lung    Breast Cancer 2nd occurrence Mother 65        had 4 occurrences of breast cancer    Cancer Paternal Grandfather         lung    Heart Attack Maternal Grandfather     Heart Disease Maternal Grandmother     Heart Disease Brother 49        stents    High Blood Pressure Brother     Other (Other) Son         vein surgery legs    Breast Cancer Maternal Cousin Female 50        No genetic testing.     Ovarian Cancer Neg     Colon Cancer Neg     Pancreatic Cancer Neg     Melanoma Neg     Uterine Cancer Neg        Review of Systems  Review of Systems   Constitutional: Negative.    Respiratory: Negative.     Cardiovascular: Negative.    Gastrointestinal: Negative.        Exam  Vitals:    08/14/24 1429   Pulse: 73   Temp: 97.3 °F (36.3 °C)     Physical Exam  Constitutional:       Appearance: Normal appearance.   Cardiovascular:      Rate and Rhythm: Normal rate.   Pulmonary:      Effort: Pulmonary effort is normal.   Skin:     General: Skin is warm and dry.   Neurological:      Mental Status: She is alert and  oriented to person, place, and time.                     Assessment/Plan  Assessment   Problem List Items Addressed This Visit    None  Visit Diagnoses       Varicose veins of leg with edema, right    -  Primary    Relevant Orders    US VENOUS INSUFFICIENCY LOWER RIGHT (CPT=93971)            Sarah Mejia is a 69 year old female with varicose veins of her right leg with swelling    On physical exam, patient has large varicosities that are bulging on her right leg  I believe patient is suffering from venous insufficiency  Will obtain ultrasound imaging to rule out reflux  I spoke with patient about conservative treatment  Prescription for medical grade, 20 to 30 mmHg, compression socks and thigh-high's provided  Patient is to wear either socks or thigh-high's, whichever is more comfortable, most days of the week, especially when she is very active or sitting for long periods  Patient should elevate her legs at night and while watching TV  Patient should also continue walking and exercising  I will have patient follow-up in 6 weeks for reevaluation  At that time, we will review patient's ultrasound imaging and develop treatment plan    If she has any questions or concerns, she is to contact my office      Maci Michael MD  General Surgery  [facility]     CC:  Kristal Valdez MD

## 2024-10-08 ENCOUNTER — OFFICE VISIT (OUTPATIENT)
Dept: RHEUMATOLOGY | Facility: CLINIC | Age: 69
End: 2024-10-08
Payer: MEDICARE

## 2024-10-08 VITALS
DIASTOLIC BLOOD PRESSURE: 84 MMHG | BODY MASS INDEX: 29.89 KG/M2 | OXYGEN SATURATION: 95 % | HEIGHT: 66 IN | WEIGHT: 186 LBS | SYSTOLIC BLOOD PRESSURE: 130 MMHG | HEART RATE: 77 BPM | TEMPERATURE: 97 F | RESPIRATION RATE: 16 BRPM

## 2024-10-08 DIAGNOSIS — M19.042 PRIMARY OSTEOARTHRITIS OF BOTH HANDS: ICD-10-CM

## 2024-10-08 DIAGNOSIS — M1A.00X1 IDIOPATHIC CHRONIC GOUT WITH TOPHUS, UNSPECIFIED SITE: Primary | ICD-10-CM

## 2024-10-08 DIAGNOSIS — M19.041 PRIMARY OSTEOARTHRITIS OF BOTH HANDS: ICD-10-CM

## 2024-10-08 DIAGNOSIS — Z51.81 THERAPEUTIC DRUG MONITORING: ICD-10-CM

## 2024-10-08 PROBLEM — D69.6 THROMBOCYTOPENIA: Chronic | Status: ACTIVE | Noted: 2024-10-08

## 2024-10-08 PROBLEM — D69.6 THROMBOCYTOPENIA (HCC): Chronic | Status: ACTIVE | Noted: 2024-10-08

## 2024-10-08 PROCEDURE — 99213 OFFICE O/P EST LOW 20 MIN: CPT | Performed by: INTERNAL MEDICINE

## 2024-10-08 PROCEDURE — G2211 COMPLEX E/M VISIT ADD ON: HCPCS | Performed by: INTERNAL MEDICINE

## 2024-10-08 RX ORDER — PREDNISONE 5 MG/1
TABLET ORAL
Qty: 32 TABLET | Refills: 1 | Status: SHIPPED | OUTPATIENT
Start: 2024-10-08 | End: 2024-10-18

## 2024-10-08 RX ORDER — ALLOPURINOL 300 MG/1
TABLET ORAL
Qty: 135 TABLET | Refills: 3 | Status: SHIPPED | OUTPATIENT
Start: 2024-10-08

## 2024-10-08 NOTE — PATIENT INSTRUCTIONS
Increase allopurinol from 300 mg daily to 450 mg daily.  He will increase from 1 pill to 1.5 pills daily    -Repeat metabolic panel and uric acid in 2 months for recheck of serum uric acid.  -Okay to keep off colchicine

## 2024-10-08 NOTE — PROGRESS NOTES
Rheumatology f/u Patient Note  =====================================================================================================  Chief complaint: gout  Chief Complaint   Patient presents with    Follow - Up     LOV 3/8/2024  Patient states she has not had any gout flare ups but that by the evening she has pain in some of her fingers.   Rapid 3 score is a 4.3     PCP  Kristal Valdez MD  Fax: [phone]  Phone: [phone]  =====================================================================================================  HPI  Sarah Mejia is a 69 year old female   ==============================================================================================================  Visit: 10/31/23  Here for follow-up for new diagnosis of gouty arthropathy.  Right hand x-ray concerning for gout.  Right wrist dual-energy CT confirmed uric acid deposition in right wrist and tendons of the extensor tendons of the fingers.  -Brothers with recurrent renal stones.  Treated with allopurinol  -Father with gout  -Continues on Nucala for severe eosinophilic asthma  -Denies any hx of overt gout flares today.  ==============================================================================================================  Visit: 03/08/24  Doing very well on allopurinol 200 mg daily and colchicine 0.6 mg daily.  Tolerating medications well.  No recent gout flares.  DIP nodule softening.  -Brothers with recurrent renal stones.  Treated with allopurinol  -Father with gout, grandfather with severe gout patient found out  -Asthma is flaring up recently  ==============================================================================================================  Today's Visit: 10/08/24    Off colchicine for 2-3 months.  Still with DIP swelling in the joints.  Other DIPs are softening.  Try to avoid any significant pinching motions.  -Denies any recurrent gout flares or renal stones.  Allopurinol 300 mg daily     5 point ROS  negative except noted above  I had reviewed past medical and family histories together with allergy and medication lists documented.      Medications:  Current Outpatient Medications on File Prior to Visit   Medication Sig Dispense Refill    levothyroxine 75 MCG Oral Tab Take 1 tablet (75 mcg total) by mouth before breakfast. 90 tablet 3    DULoxetine 60 MG Oral Cap DR Particles Take 2 capsules (120 mg total) by mouth daily. 60 capsule 2    rosuvastatin 20 MG Oral Tab Take 1 tablet (20 mg total) by mouth nightly. 90 tablet 3    metoprolol tartrate 50 MG Oral Tab Take 0.5 tablets (25 mg total) by mouth daily. 45 tablet 3    NUCALA 100 MG/ML Subcutaneous Solution Auto-injector       mometasone 0.1 % External Ointment Thin layer to affected area nightly for 4 weeks, then every other night x 4 weeks, twice weekly for 4 weeks. See gynecologist for re-assessment. 45 g 3    Budesonide-Formoterol Fumarate 160-4.5 MCG/ACT Inhalation Aerosol Inhale 2 puffs into the lungs 2 (two) times daily.      Albuterol Sulfate  (90 Base) MCG/ACT Inhalation Aero Soln Inhale 2 puffs into the lungs every 4 (four) hours as needed for Wheezing or Shortness of Breath. 1 Inhaler 3    Tiotropium Bromide Monohydrate (SPIRIVA RESPIMAT) 1.25 MCG/ACT Inhalation Aero Soln Inhale 2 sprays into the lungs daily. 3 Inhaler 2    aspirin 81 MG Oral Chew Tab Chew 1 tablet (81 mg total) by mouth every morning.      Omega-3 Fatty Acids (OMEGA 3 OR) Take 1,000 mg by mouth 2 (two) times daily.        colchicine 0.6 MG Oral Tab Take 1 tablet (0.6 mg total) by mouth daily. (Patient not taking: Reported on 10/8/2024) 90 tablet 1    clindamycin 150 MG Oral Cap Take 4 capsules (600 mg total) by mouth as needed. 1 HOUR BEFORE DENTAL APPT (Patient not taking: Reported on 10/8/2024)       No current facility-administered medications on file prior to visit.     ?  Allergies:  Allergies   Allergen Reactions    Penicillins HIVES     swelling    Sulfa Antibiotics  SWELLING         Objective    Vitals:    10/08/24 0930   BP: 130/84   Pulse: 77   Resp: 16   Temp: 97.3 °F (36.3 °C)   SpO2: 95%   Weight: 186 lb (84.4 kg)   Height: 5' 6\" (1.676 m)     GEN: NAD, well-nourished.   HEENT: Head: NCAT. Face: No lesions. Eyes: Conjunctiva clear.   PULM:  easy effort  Extremities: No cyanosis, edema or deformities.   Neurologic: Strength, CN2-12 grossly intact   Skin: No lesions or rashes.  MSK: 28 joint count performed. No evidence of synovitis in mcp, pip, dip, wrist, elbows, shoulders, hips, knees, ankles, mtp unless otherwise noted. Full ROM of elbows, wrists, knees.     Hand OA changes particularly of the DIPs which have prominent synovial cysts.  -Hoang's nodes noted  -Prior DIP nodules have softened, new onset formed and previously unaffected locations.      Labs:    3/2023  Creatinine 0.97, rest of CMP WNL  Ferritin 106  Percent sat 24  CBC W differential WNL  TSH WNL     Lab Results   Component Value Date    URIC 4.0 03/06/2024    URIC 5.4 10/08/2023       Lab Results   Component Value Date    WBC 5.1 08/15/2024    RBC 4.56 08/15/2024    HGB 13.4 08/15/2024    HCT 41.8 08/15/2024    .0 08/15/2024    MCV 91.7 08/15/2024    MCH 29.4 08/15/2024    MCHC 32.1 08/15/2024    RDW 13.3 08/15/2024    NEPRELIM 2.71 08/15/2024    NEPERCENT 53.3 08/15/2024    LYPERCENT 32.3 08/15/2024    MOPERCENT 12.2 08/15/2024    EOPERCENT 1.2 08/15/2024    BAPERCENT 0.6 08/15/2024    NE 2.71 08/15/2024    LYMABS 1.64 08/15/2024    MOABSO 0.62 08/15/2024    EOABSO 0.06 08/15/2024    BAABSO 0.03 08/15/2024     Lab Results   Component Value Date    GLU 85 08/15/2024    BUN 19 08/15/2024    BUNCREA 22.2 (H) 06/09/2021    CREATSERUM 0.91 08/15/2024    ANIONGAP <0 (L) 08/15/2024    GFR 74 11/13/2017    GFRNAA 61 06/25/2022    GFRAA 71 06/25/2022    CA 10.2 08/15/2024    OSMOCALC 282 08/15/2024    ALKPHO 90 08/15/2024    AST 32 08/15/2024    ALT 28 08/15/2024    BILT 0.7 08/15/2024    TP 6.7  08/15/2024    ALB 4.6 08/15/2024    GLOBULIN 2.1 08/15/2024    AGRATIO 1.7 02/05/2016     (L) 08/15/2024    K 4.7 08/15/2024     08/15/2024    CO2 32.0 08/15/2024         No results found for: \"ANATI\", \"PETROS\", \"ANAS\", \"ANASCRN\", \"ANASCRNRFLX\", \"CHARIS\"  No results found for: \"SSA\", \"SSAUR\", \"ANTISSA\", \"SSA52\", \"SSA60\", \"SSADD\", \"SSB\", \"ANTISSB\"  No results found for: \"DSDNA\", \"ANTIDSDNA\", \"SMUD\", \"ANTISM\", \"SM\", \"RNP\", \"ANTIRNP\", \"SMITHRNP\"  No results found for: \"SCL70\", \"SCL\", \"RPALGEU22\"  No results found for: \"C3\", \"C4\"  No results found for: \"DRVVT\", \"LAINT\", \"PTTLUPUS\", \"LUPUSINTERP\", \"LA\", \"Y2VP0SQIUU\", \"K1XF4HEQMA\", \"J2DQPCDJJO\", \"C6GGGQWUQU\"  No results found for: \"CARDIOLIPIGG\", \"CARDIOLIPIGM\", \"CARDIOLIPIGA\", \"CARDIOIGA\", \"CARLIP\"      Additional Labs:    Radiology:  CT WRIST RIGHT (TQQ=[id number])    Result Date: 10/11/2023  CONCLUSION:  1. Severe osteoarthritis along the radial aspect of the wrist and thumb as described above. 2. Dual energy technique suggest soft tissue deposition of monosodium urate crystals along the flexor and extensor tendon sheaths of the distal forearm and wrist as noted above.  No focal or obvious gouty tophi are identified on soft tissue CT images.   LOCATION:  [facility]   Dictated by (CST): Kiara Burt DO on 10/11/2023 at 1:59 PM     Finalized by (CST): Kiara Burt DO on 10/11/2023 at 4:24 PM       XR HAND BILAT (MIN 3 VIEWS) (CPT=73130-50)    Result Date: 9/28/2023  CONCLUSION:  Osteoarthritic changes as described above bilaterally.   LOCATION:  [facility]   Dictated by (CST): Ravi Perez MD on 9/28/2023 at 8:48 AM     Finalized by (CST): Ravi Perez MD on 9/28/2023 at 8:57 AM         Radiology review:      =====================================================================================================  Assessment and Plan    Assessment:  1. Idiopathic chronic gout with tophus, unspecified site    2. Therapeutic drug monitoring    3. Primary osteoarthritis of  both hands      #Erosive, tophaceous gouty arthropathy: Particularly of the hands and wrists.  Dual-energy CT proven.  Erosive changes of the right scaphoid appreciated.  -Father with gout  -No previously appreciated gout flares.  -Prior hand DIP tophi improving and are softening.  Patient with increased function of her hands with urate lowering therapy.  However new DIP tophi are now present.    #severe eosinophilic asthma on Nucala.  No signs or symptoms consistent with eosinophilic granulomatosis with polyangiitis.  ?  Plan:    Since significant gout developed at serum urate of 5.4, will target mid to low 3.0 for uric acid range.    Increase allopurinol from 300 mg daily to 450 mg daily.  He will increase from 1 pill to 1.5 pills daily    -Repeat metabolic panel and uric acid in 2 months for recheck of serum uric acid.  -Okay to keep off colchicine    Prednisone taper given (only as needed for gout flares):  -Okay to take for this current asthma flareup if needed  6 tabs (30 mg) daily for 2 days  THEN 4 tabs (20 mg) daily for 2 days,   THEN 3 tabs (15 mg) daily for 2 day  THEN 2 tabs (10 mg ) daily for 2 days  THEN 1 tablet (5 mg total) daily for 2 days.    Rtc 9 months     Diagnoses and all orders for this visit:    Idiopathic chronic gout with tophus, unspecified site  -     Comp Metabolic Panel (14); Future  -     Uric Acid; Future  -     allopurinol 300 MG Oral Tab; Take 1.5 pill daily  -     predniSONE 5 MG Oral Tab; Take 6 tablets (30 mg total) by mouth daily for 2 days, THEN 4 tablets (20 mg total) daily for 2 days, THEN 3 tablets (15 mg total) daily for 2 days, THEN 2 tablets (10 mg total) daily for 2 days, THEN 1 tablet (5 mg total) daily for 2 days. For gout flare only.    Therapeutic drug monitoring  -     Comp Metabolic Panel (14); Future  -     Uric Acid; Future  -     allopurinol 300 MG Oral Tab; Take 1.5 pill daily  -     predniSONE 5 MG Oral Tab; Take 6 tablets (30 mg total) by mouth daily for 2  days, THEN 4 tablets (20 mg total) daily for 2 days, THEN 3 tablets (15 mg total) daily for 2 days, THEN 2 tablets (10 mg total) daily for 2 days, THEN 1 tablet (5 mg total) daily for 2 days. For gout flare only.    Primary osteoarthritis of both hands  -     Comp Metabolic Panel (14); Future  -     Uric Acid; Future  -     allopurinol 300 MG Oral Tab; Take 1.5 pill daily  -     predniSONE 5 MG Oral Tab; Take 6 tablets (30 mg total) by mouth daily for 2 days, THEN 4 tablets (20 mg total) daily for 2 days, THEN 3 tablets (15 mg total) daily for 2 days, THEN 2 tablets (10 mg total) daily for 2 days, THEN 1 tablet (5 mg total) daily for 2 days. For gout flare only.            Return in about 9 months (around 7/8/2025).      The above plan of care, diagnosis, orders, and follow-up were discussed with the patient. Questions related to this recommended plan of care were answered.    Thank you for referring this delightful patient to me. Please feel free to contact me with any questions.     This report was performed utilizing speech recognition software technology. Despite proofreading, speech recognition errors could escape detection. If a word or phrase is confusing or out of context, please do not hesitate to call for   clarification.       Kind regards      Thad Omalley MD  [facility]

## 2024-12-26 DIAGNOSIS — G89.29 CHRONIC JOINT PAIN: ICD-10-CM

## 2024-12-26 DIAGNOSIS — R23.2 HOT FLASHES: ICD-10-CM

## 2024-12-26 DIAGNOSIS — M25.50 CHRONIC JOINT PAIN: ICD-10-CM

## 2024-12-30 RX ORDER — DULOXETIN HYDROCHLORIDE 60 MG/1
120 CAPSULE, DELAYED RELEASE ORAL DAILY
Qty: 60 CAPSULE | Refills: 2 | OUTPATIENT
Start: 2024-12-30

## 2024-12-30 RX ORDER — DULOXETIN HYDROCHLORIDE 60 MG/1
120 CAPSULE, DELAYED RELEASE ORAL DAILY
Qty: 180 CAPSULE | Refills: 0 | Status: SHIPPED | OUTPATIENT
Start: 2024-12-30

## 2024-12-30 NOTE — TELEPHONE ENCOUNTER
Refill passed per [facility] protocol.    Please review pended refill request as unable to refill due to high/very hig  High  High Dose: DULoxetine, 120 mg, Oral, DailySingle dose of 120 mg exceeds recommended maximum of 60 mg by 100%  Daily dose of 120 mg exceeds recommended maximum of 60 mg by 100%h interaction warning copied here:      Requested Prescriptions   Pending Prescriptions Disp Refills    DULOXETINE 60 MG Oral Cap DR Particles [Pharmacy Med Name: DULOXETINE DR 60MG CAPSULES] 60 capsule 2     Sig: TAKE 2 CAPSULES(120 MG) BY MOUTH DAILY       Psychiatric Non-Scheduled (Anti-Anxiety) Passed - 12/30/2024  4:39 PM        Passed - In person appointment or virtual visit in the past 6 mos or appointment in next 3 mos     Recent Outpatient Visits              2 months ago Idiopathic chronic gout with tophus, unspecified site    [facility] Thad Omalley MD    Office Visit    4 months ago Varicose veins of leg with edema, right    [facility], [facility],Maci Landry MD    Office Visit    5 months ago Encounter for Medicare annual wellness exam    [facility], [address] [place] [address] Kristal Maldonado MD    Office Visit    6 months ago Right leg swelling    [address] APR[address]    Office Visit    9 months ago Chronic joint pain    [address] Kristal Maldonado MD    Office Visit          Future Appointments         Provider Department Appt Notes    In 6 months Thad Omalley MD [facility] 9mo                    Passed - Depression Screening completed within the past 12 months           Future Appointments         Provider Department Appt Notes    In 6 months Thad Omalley MD [facility] 9mo          Recent Outpatient Visits               2 months ago Idiopathic chronic gout with tophus, unspecified site    [facility], [facility], Thad Viramontes MD    Office Visit    4 months ago Varicose veins of leg with edema, right    [facility], [facility],Maci Landry MD    Office Visit    5 months ago Encounter for Medicare annual wellness exam    [facility], [address] [place] [address], Kristal Maldonado MD    Office Visit    6 months ago Right leg swelling    [facility], [address] [place] [address], Reba Loza APRN    Office Visit    9 months ago Chronic joint pain    [facility], [address] [place] [address], Kristal Maldonado MD    Office Visit

## 2024-12-30 NOTE — TELEPHONE ENCOUNTER
Refill passed per [facility] protocol.     Requested Prescriptions   Pending Prescriptions Disp Refills    DULOXETINE 60 MG Oral Cap DR Particles [Pharmacy Med Name: DULOXETINE DR 60MG CAPSULES] 60 capsule 2     Sig: TAKE 2 CAPSULES(120 MG) BY MOUTH DAILY       Psychiatric Non-Scheduled (Anti-Anxiety) Passed - 12/30/2024  4:41 PM        Passed - In person appointment or virtual visit in the past 6 mos or appointment in next 3 mos     Recent Outpatient Visits              2 months ago Idiopathic chronic gout with tophus, unspecified site    [facility], [facility] Thad Viramontes MD    Office Visit    4 months ago Varicose veins of leg with edema, right    [facility], [facility],Maci Landry MD    Office Visit    5 months ago Encounter for Medicare annual wellness exam    [address]    Office Visit    6 months ago Right leg swelling    [address]    Office Visit    9 months ago Chronic joint pain    [address]    Office Visit          Future Appointments         Provider Department Appt Notes    In 6 months Thad Omalley MD [facility] 9mo                    Passed - Depression Screening completed within the past 12 months

## 2025-01-21 ENCOUNTER — MED REC SCAN ONLY (OUTPATIENT)
Dept: INTERNAL MEDICINE CLINIC | Facility: CLINIC | Age: 70
End: 2025-01-21

## 2025-01-21 NOTE — PROGRESS NOTES
Received letter from [employer] on 01/20/2025 regarding recall on Duloxetine Delayed Release Capsule 60 mg NDC [address]-62 Lot # [id number] expiration date of 11/2025. Letter placed in TB bin to review

## 2025-03-25 ENCOUNTER — TELEPHONE (OUTPATIENT)
Dept: INTERNAL MEDICINE CLINIC | Facility: CLINIC | Age: 70
End: 2025-03-25

## 2025-03-25 DIAGNOSIS — M25.50 CHRONIC JOINT PAIN: ICD-10-CM

## 2025-03-25 DIAGNOSIS — R23.2 HOT FLASHES: ICD-10-CM

## 2025-03-25 DIAGNOSIS — G89.29 CHRONIC JOINT PAIN: ICD-10-CM

## 2025-03-28 RX ORDER — DULOXETIN HYDROCHLORIDE 60 MG/1
120 CAPSULE, DELAYED RELEASE ORAL DAILY
Qty: 180 CAPSULE | Refills: 0 | Status: SHIPPED | OUTPATIENT
Start: 2025-03-28

## 2025-05-09 ENCOUNTER — OFFICE VISIT (OUTPATIENT)
Dept: FAMILY MEDICINE CLINIC | Facility: CLINIC | Age: 70
End: 2025-05-09
Payer: MEDICARE

## 2025-05-09 VITALS
OXYGEN SATURATION: 98 % | DIASTOLIC BLOOD PRESSURE: 82 MMHG | BODY MASS INDEX: 30 KG/M2 | HEART RATE: 92 BPM | TEMPERATURE: 98 F | RESPIRATION RATE: 18 BRPM | SYSTOLIC BLOOD PRESSURE: 132 MMHG | WEIGHT: 187 LBS

## 2025-05-09 DIAGNOSIS — H73.20 TYMPANIC MEMBRANE INFLAMMATION: ICD-10-CM

## 2025-05-09 DIAGNOSIS — H72.91 PERFORATION OF RIGHT TYMPANIC MEMBRANE: Primary | ICD-10-CM

## 2025-05-09 PROCEDURE — 1159F MED LIST DOCD IN RCRD: CPT | Performed by: PHYSICIAN ASSISTANT

## 2025-05-09 PROCEDURE — 99213 OFFICE O/P EST LOW 20 MIN: CPT | Performed by: PHYSICIAN ASSISTANT

## 2025-05-09 PROCEDURE — 1160F RVW MEDS BY RX/DR IN RCRD: CPT | Performed by: PHYSICIAN ASSISTANT

## 2025-05-09 RX ORDER — OFLOXACIN 3 MG/ML
10 SOLUTION AURICULAR (OTIC) 2 TIMES DAILY
Qty: 1 EACH | Refills: 0 | Status: SHIPPED | OUTPATIENT
Start: 2025-05-09 | End: 2025-05-23

## 2025-05-09 RX ORDER — DOXYCYCLINE HYCLATE 100 MG
100 TABLET ORAL 2 TIMES DAILY
Qty: 20 TABLET | Refills: 0 | Status: SHIPPED | OUTPATIENT
Start: 2025-05-09 | End: 2025-05-19

## 2025-05-09 NOTE — PATIENT INSTRUCTIONS
Tylenol as needed for pain   Keep ear dry. Avoid pressure- planes, altitude changes, swimming   Start ear drop and oral antibiotic   Follow up with ENT/PCP to monitor healing or if any worsening symptoms

## 2025-05-09 NOTE — PROGRESS NOTES
CHIEF COMPLAINT:     Chief Complaint   Patient presents with    Ear Problem     X 1 day  Sx: right ear pain        HPI:   Sarah Mejia is a 70 year old female who presents to clinic today with complaints of right ear pain for one day. Pain started while on an airplane yesterday. + extreme pain and pressure, then heard a pop and had pain that made her \" want to scream,\" and then pain resolved. No discharge or blood from the ear. Now muffled hearing and fullness. No fever. No headache. No dizziness. No URI symptoms. Feels well otherwise     Current Medications[1]   Past Medical History[2]   Social History:  Short Social Hx on File[3]     REVIEW OF SYSTEMS:   GENERAL: See HPI  SKIN: no unusual skin lesions or rashes  HEENT: See HPI  LUNGS: No shortness of breath, or wheezing.  CARDIOVASCULAR: No chest pain, palpitations  GI: No N/V/C/D.  NEURO: denies headaches or dizziness    EXAM:   /82   Pulse 92   Temp 97.9 °F (36.6 °C)   Resp 18   Wt 187 lb (84.8 kg)   LMP  (LMP Unknown)   SpO2 98%   BMI 30.18 kg/m²   Physical Exam  Constitutional:       Appearance: Normal appearance.   HENT:      Head: Normocephalic.      Right Ear: Ear canal and external ear normal. No drainage or tenderness. No mastoid tenderness. Tympanic membrane is injected (some injection noted in intact portion of TM) and perforated (at the base of the TM, not a complete rupture, + blood). Tympanic membrane is not bulging.      Left Ear: Tympanic membrane, ear canal and external ear normal.      Nose: Nose normal.      Mouth/Throat:      Mouth: Mucous membranes are moist.      Pharynx: Oropharynx is clear. No posterior oropharyngeal erythema.   Eyes:      Conjunctiva/sclera: Conjunctivae normal.      Pupils: Pupils are equal, round, and reactive to light.   Cardiovascular:      Rate and Rhythm: Normal rate and regular rhythm.   Pulmonary:      Effort: Pulmonary effort is normal.      Breath sounds: Normal breath sounds.    Musculoskeletal:      Cervical back: Normal range of motion and neck supple.   Lymphadenopathy:      Cervical: No cervical adenopathy.   Neurological:      Mental Status: She is alert.       No results found for this or any previous visit (from the past 24 hours).    ASSESSMENT AND PLAN:   Sarah Mejia is a 70 year old female who presents with ear problems symptoms are consistent with    ASSESSMENT:  Encounter Diagnoses   Name Primary?    Perforation of right tympanic membrane Yes    Tympanic membrane inflammation        PLAN: Meds as listed below.  Comfort measures as described in Patient Instructions    Meds & Refills for this Visit:  Requested Prescriptions     Signed Prescriptions Disp Refills    Doxycycline Hyclate 100 MG Oral Tab 20 tablet 0     Sig: Take 1 tablet (100 mg total) by mouth 2 (two) times daily for 10 days.    ofloxacin 0.3 % Otic Solution 1 each 0     Sig: Place 10 drops into the right ear 2 (two) times daily for 14 days.         Risk and benefits of medication discussed. Sun protection   If antibiotics prescribed, stressed importance of completing full course of antibiotic.         Patient voiced understand and is in agreement with treatment plan.    Patient Instructions   Tylenol as needed for pain   Keep ear dry. Avoid pressure- planes, altitude changes, swimming   Start ear drop and oral antibiotic   Follow up with ENT/PCP to monitor healing or if any worsening symptoms            [1]   Current Outpatient Medications   Medication Sig Dispense Refill    Doxycycline Hyclate 100 MG Oral Tab Take 1 tablet (100 mg total) by mouth 2 (two) times daily for 10 days. 20 tablet 0    ofloxacin 0.3 % Otic Solution Place 10 drops into the right ear 2 (two) times daily for 14 days. 1 each 0    DULoxetine 60 MG Oral Cap DR Particles Take 2 capsules (120 mg total) by mouth daily. 180 capsule 0    allopurinol 300 MG Oral Tab Take 1.5 pill daily 135 tablet 3    levothyroxine 75 MCG Oral Tab Take 1 tablet  (75 mcg total) by mouth before breakfast. 90 tablet 3    rosuvastatin 20 MG Oral Tab Take 1 tablet (20 mg total) by mouth nightly. 90 tablet 3    metoprolol tartrate 50 MG Oral Tab Take 0.5 tablets (25 mg total) by mouth daily. 45 tablet 3    colchicine 0.6 MG Oral Tab Take 1 tablet (0.6 mg total) by mouth daily. (Patient not taking: Reported on 10/8/2024) 90 tablet 1    NUCALA 100 MG/ML Subcutaneous Solution Auto-injector       clindamycin 150 MG Oral Cap Take 4 capsules (600 mg total) by mouth as needed. 1 HOUR BEFORE DENTAL APPT (Patient not taking: Reported on 10/8/2024)      mometasone 0.1 % External Ointment Thin layer to affected area nightly for 4 weeks, then every other night x 4 weeks, twice weekly for 4 weeks. See gynecologist for re-assessment. 45 g 3    Budesonide-Formoterol Fumarate 160-4.5 MCG/ACT Inhalation Aerosol Inhale 2 puffs into the lungs 2 (two) times daily.      Albuterol Sulfate  (90 Base) MCG/ACT Inhalation Aero Soln Inhale 2 puffs into the lungs every 4 (four) hours as needed for Wheezing or Shortness of Breath. 1 Inhaler 3    Tiotropium Bromide Monohydrate (SPIRIVA RESPIMAT) 1.25 MCG/ACT Inhalation Aero Soln Inhale 2 sprays into the lungs daily. 3 Inhaler 2    aspirin 81 MG Oral Chew Tab Chew 1 tablet (81 mg total) by mouth every morning.      Omega-3 Fatty Acids (OMEGA 3 OR) Take 1,000 mg by mouth 2 (two) times daily.       [2]   Past Medical History:   Acute midline low back pain without sciatica    Acute postoperative pain of right shoulder    Asthma (HCC)    Atherosclerosis of aorta    CXR - 3/14/19    Basal cell carcinoma (BCC)    Nose    BRCA1 negative    BRCA2 negative    Cancer screening    [employer] Common Hereditary Cancers Panel = Negative for 47 genes.     Cellulitis of hand, left    Chondromalacia of patella    Chronic right shoulder pain    Colon polyps    scope    COVID    recved IV treatment , NO Residual effects.  ASTHMA FLAIR, COUGH FEVERS. RESOLVED    Decreased GFR     GFR 58 - 10/18/18     Depression    resolved, takes Duloxetine for hot flashes     Dyslipidemia    Endometriosis    Very painful periods. Was suspected to have endometriosis.     Endometriosis    Focal endometriosis on path from hysterectomy 2022. At time of surgery, findings consistent with scarring from probable stage 3/4 endometriosis.     Eosinophilic asthma (HCC)    Essential hypertension    Fall    Family history of heart disease    Ganglion of right wrist    OR by     Head injury    High blood pressure    High cholesterol    HTN (hypertension)    Hyperlipidemia    Hypothyroidism    Inguinal hernia    OR    Lichen sclerosus of vulva    biopsy proven    Menopausal symptoms    Menopause    age 46.     Nontraumatic complete tear of right rotator cuff    Nontraumatic rupture of right long head biceps tendon    Obesity    Obesity (BMI 30-39.9)    MELISSA (obstructive sleep apnea)    AHI-7    Pap smear for cervical cancer screening    Pap negative     Plantar fasciitis    left    Prediabetes    Right knee meniscal tear    OR-[facility]ard    SAH (subarachnoid hemorrhage) (HCC)    Serous cystadenoma of right ovary    BSO performed. Pelvic washings benign    CHERELLE (stress urinary incontinence, female)    Had urogyn eval with Dr. Wagoner. Was told she holds her bladder a lot & that she is not a sling candidate.     Tendonitis of ankle, left   [3]   Social History  Socioeconomic History    Marital status:      Spouse name: Sean    Number of children: 2   Occupational History     Employer: [id number]   Tobacco Use    Smoking status: Former     Current packs/day: 0.00     Average packs/day: 1.5 packs/day for 15.6 years (23.4 ttl pk-yrs)     Types: Cigarettes     Start date: 1970     Quit date: 1985     Years since quittin.7     Passive exposure: Never    Smokeless tobacco: Former   Vaping Use    Vaping status: Never Used   Substance and Sexual Activity    Alcohol use: Yes     Comment: monthly     Drug  use: No    Sexual activity: Not Currently     Partners: Male     Birth control/protection: Hysterectomy   Other Topics Concern    Caffeine Concern Yes     Comment: 1 cup of coffee    Exercise Yes     Comment: walking      Quinolones Pregnancy And Lactation Text: This medication is Pregnancy Category C and it isn't know if it is safe during pregnancy. It is also excreted in breast milk.

## 2025-05-11 ENCOUNTER — OFFICE VISIT (OUTPATIENT)
Dept: FAMILY MEDICINE CLINIC | Facility: CLINIC | Age: 70
End: 2025-05-11
Payer: MEDICARE

## 2025-05-11 VITALS
RESPIRATION RATE: 18 BRPM | SYSTOLIC BLOOD PRESSURE: 144 MMHG | WEIGHT: 187 LBS | HEART RATE: 89 BPM | OXYGEN SATURATION: 96 % | BODY MASS INDEX: 30 KG/M2 | DIASTOLIC BLOOD PRESSURE: 80 MMHG | TEMPERATURE: 99 F

## 2025-05-11 DIAGNOSIS — U07.1 COVID-19: Primary | ICD-10-CM

## 2025-05-11 DIAGNOSIS — R51.9 ACUTE NONINTRACTABLE HEADACHE, UNSPECIFIED HEADACHE TYPE: ICD-10-CM

## 2025-05-11 LAB
OPERATOR ID: ABNORMAL
RAPID SARS-COV-2 BY PCR: DETECTED

## 2025-05-11 NOTE — PROGRESS NOTES
Sarah Mejia is a 70 year old female.    S:  Patient presents today with the following concerns:  No chief complaint on file.  Started Doxycline and Floxin otic 2 days ago for ear infection/ruptured TM.  Just got back from a trip to [place].  Ear drum ruptured on flight home.  Now has headache and mucous in her throat.  Headache was very bad last night.  She rates it as a 10 on pain scale of 1-10.  Now it is a 4.  She feels a little bit dizzy.  She now has a cough.  She was reading about doxycycline and concerned that it could cause a headache due to \"brain swelling\".  She was also concerned that Doxycycline was related to PCN and sulfa antibiotics which she is allergic to.  No dyspnea  or fevers.  No rash.   No trouble swallowing.    Current Medications[1]  Problem List[2]  Family History[3]    REVIEW OF SYSTEMS:  GENERAL: feels somewhat unwell  SKIN: denies any unusual skin lesions  EYES:denies vision change  LUNGS: denies shortness of breath with exertion  CARDIOVASCULAR: denies chest pain on exertion  GI: denies abdominal pain.  No N/V/D/C  : denies dysuria  MUSCULOSKELETAL: denies back pain  NEURO:  headaches    EXAM:  /80   Pulse 89   Temp 98.5 °F (36.9 °C) (Oral)   Resp 18   Wt 187 lb (84.8 kg)   LMP  (LMP Unknown)   SpO2 96%   BMI 30.18 kg/m²   Physical Exam  Constitutional:       General: She is not in acute distress.     Appearance: Normal appearance. She is not ill-appearing, toxic-appearing or diaphoretic.   HENT:      Head: Normocephalic and atraumatic.      Right Ear: Ear canal and external ear normal.      Left Ear: Tympanic membrane, ear canal and external ear normal.      Ears:      Comments: Right TM between 6:00 and 9:00 with mild erythema.  At the bottom of this is a small scab on the TM.  Rest of TM is normal.     Nose: Congestion present.      Mouth/Throat:      Mouth: Mucous membranes are moist.      Pharynx: Oropharynx is clear. No oropharyngeal exudate or posterior  oropharyngeal erythema.   Eyes:      Extraocular Movements: Extraocular movements intact.      Conjunctiva/sclera: Conjunctivae normal.      Pupils: Pupils are equal, round, and reactive to light.   Cardiovascular:      Rate and Rhythm: Normal rate and regular rhythm.      Heart sounds: Normal heart sounds.   Pulmonary:      Effort: Pulmonary effort is normal.      Breath sounds: Normal breath sounds.   Musculoskeletal:      Cervical back: Neck supple. No rigidity or tenderness.   Lymphadenopathy:      Cervical: No cervical adenopathy.   Skin:     General: Skin is warm and dry.   Neurological:      General: No focal deficit present.      Mental Status: She is alert and oriented to person, place, and time.      Motor: No weakness.      Coordination: Coordination is intact. Romberg sign negative. Coordination normal. Heel to Shin Test normal. Rapid alternating movements normal.      Gait: Gait normal.   Psychiatric:         Mood and Affect: Mood normal.         Behavior: Behavior normal.     Rapid Covid 19 test is Positive.     ASSESSMENT AND PLAN:  Sarah Mejia is a 70 year old female.  Encounter Diagnoses   Name Primary?    COVID-19 Yes    Acute nonintractable headache, unspecified headache type        No results found.     Orders Placed This Encounter   Procedures    Rapid Covid-19     Meds & Refills for this Visit:  Requested Prescriptions      No prescriptions requested or ordered in this encounter     Imaging & Consults:  None    No follow-ups on file.   Discussed with patient that headache is likely from Covid infection.  The headache is better today.  Patient is relieved to hear that Doxycycline is not related to PCN or sulfa.    Patient declines Paxlovid.    Recommend fluids, rest, steam.  Tylenol or ibuprofen prn headache.    Contagious until feeling better or fever free (if develops one) for 24 hours.    Go to ED with dyspnea or chest pain or if has a severe headache and/or neurologic symptoms.       Patient verbalizes understanding of plan.       [1]   Current Outpatient Medications   Medication Sig Dispense Refill    ofloxacin 0.3 % Otic Solution Place 10 drops into the right ear 2 (two) times daily for 14 days. 1 each 0    DULoxetine 60 MG Oral Cap DR Particles Take 2 capsules (120 mg total) by mouth daily. 180 capsule 0    allopurinol 300 MG Oral Tab Take 1.5 pill daily 135 tablet 3    levothyroxine 75 MCG Oral Tab Take 1 tablet (75 mcg total) by mouth before breakfast. 90 tablet 3    rosuvastatin 20 MG Oral Tab Take 1 tablet (20 mg total) by mouth nightly. 90 tablet 3    metoprolol tartrate 50 MG Oral Tab Take 0.5 tablets (25 mg total) by mouth daily. 45 tablet 3    NUCALA 100 MG/ML Subcutaneous Solution Auto-injector       Albuterol Sulfate  (90 Base) MCG/ACT Inhalation Aero Soln Inhale 2 puffs into the lungs every 4 (four) hours as needed for Wheezing or Shortness of Breath. 1 Inhaler 3    Tiotropium Bromide Monohydrate (SPIRIVA RESPIMAT) 1.25 MCG/ACT Inhalation Aero Soln Inhale 2 sprays into the lungs daily. 3 Inhaler 2    aspirin 81 MG Oral Chew Tab Chew 1 tablet (81 mg total) by mouth every morning.      Omega-3 Fatty Acids (OMEGA 3 OR) Take 1,000 mg by mouth 2 (two) times daily.        Doxycycline Hyclate 100 MG Oral Tab Take 1 tablet (100 mg total) by mouth 2 (two) times daily for 10 days. 20 tablet 0    colchicine 0.6 MG Oral Tab Take 1 tablet (0.6 mg total) by mouth daily. (Patient not taking: Reported on 10/8/2024) 90 tablet 1    clindamycin 150 MG Oral Cap Take 4 capsules (600 mg total) by mouth as needed. 1 HOUR BEFORE DENTAL APPT (Patient not taking: Reported on 10/8/2024)      mometasone 0.1 % External Ointment Thin layer to affected area nightly for 4 weeks, then every other night x 4 weeks, twice weekly for 4 weeks. See gynecologist for re-assessment. 45 g 3    Budesonide-Formoterol Fumarate 160-4.5 MCG/ACT Inhalation Aerosol Inhale 2 puffs into the lungs 2 (two) times daily.      [2]   Patient Active Problem List  Diagnosis    Essential hypertension    Dyslipidemia    Hypothyroidism    Family history of heart disease    Personal history of colonic polyps    Atherosclerosis of aorta    Eosinophilic asthma (HCC)    Head injury    Family history of breast cancer in mother    Chronic vulvitis    History of subarachnoid hemorrhage    Hot flashes    Status post laparoscopic hysterectomy    S/P bilateral salpingo-oophorectomy    Pelvic adhesions    Lichen sclerosus of vulva    Onychomycosis    Primary osteoarthritis of both hands    Blood glucose elevated    Speech disturbance    Glucose intolerance    Idiopathic chronic gout with tophus    Coronary artery calcification seen on CT scan    Thrombocytopenia   [3]   Family History  Problem Relation Age of Onset    Heart Disease Brother     High Blood Pressure Brother     Cancer Brother 67        2 cancerous growths removed arm    Other (heart diseases) Brother     Heart Disease Brother     High Blood Pressure Brother     Cancer Brother 67        scalp cancerous growth removed    Other (skin cancer) Brother     Diabetes Father     Other (Other) Father     Breast Cancer Mother 60    Heart Disease Mother     Cancer Mother         lung    Breast Cancer 2nd occurrence Mother 65        had 4 occurrences of breast cancer    Cancer Paternal Grandfather         lung    Heart Attack Maternal Grandfather     Heart Disease Maternal Grandmother     Heart Disease Brother 49        stents    High Blood Pressure Brother     Other (Other) Son         vein surgery legs    Breast Cancer Maternal Cousin Female 50        No genetic testing.     Ovarian Cancer Neg     Colon Cancer Neg     Pancreatic Cancer Neg     Melanoma Neg     Uterine Cancer Neg

## 2025-06-05 ENCOUNTER — OFFICE VISIT (OUTPATIENT)
Dept: FAMILY MEDICINE CLINIC | Facility: CLINIC | Age: 70
End: 2025-06-05
Payer: MEDICARE

## 2025-06-05 VITALS
OXYGEN SATURATION: 96 % | TEMPERATURE: 98 F | RESPIRATION RATE: 18 BRPM | WEIGHT: 178 LBS | SYSTOLIC BLOOD PRESSURE: 114 MMHG | HEART RATE: 57 BPM | BODY MASS INDEX: 29 KG/M2 | DIASTOLIC BLOOD PRESSURE: 70 MMHG

## 2025-06-05 DIAGNOSIS — J45.40 MODERATE PERSISTENT ASTHMA, UNSPECIFIED WHETHER COMPLICATED (HCC): ICD-10-CM

## 2025-06-05 DIAGNOSIS — J02.9 SORE THROAT: Primary | ICD-10-CM

## 2025-06-05 DIAGNOSIS — J04.0 LARYNGITIS: ICD-10-CM

## 2025-06-05 LAB
CONTROL LINE PRESENT WITH A CLEAR BACKGROUND (YES/NO): YES YES/NO
KIT LOT #: NORMAL NUMERIC
STREP GRP A CUL-SCR: NEGATIVE

## 2025-06-05 PROCEDURE — 1160F RVW MEDS BY RX/DR IN RCRD: CPT | Performed by: NURSE PRACTITIONER

## 2025-06-05 PROCEDURE — 87880 STREP A ASSAY W/OPTIC: CPT | Performed by: NURSE PRACTITIONER

## 2025-06-05 PROCEDURE — 99213 OFFICE O/P EST LOW 20 MIN: CPT | Performed by: NURSE PRACTITIONER

## 2025-06-05 PROCEDURE — 1159F MED LIST DOCD IN RCRD: CPT | Performed by: NURSE PRACTITIONER

## 2025-06-05 RX ORDER — PREDNISONE 20 MG/1
40 TABLET ORAL DAILY
Qty: 10 TABLET | Refills: 0 | Status: SHIPPED | OUTPATIENT
Start: 2025-06-05 | End: 2025-06-10

## 2025-06-05 NOTE — PROGRESS NOTES
HPI:   Sarah Mejia is a 70 year old female who presents with ill symptoms for  1  weeks. Patient reports sore throat, laryngitis, notes increased cough. Has tried inhaler and medications for pulmonology with not much relief. No known sick. Unsure if allergies are causing issue. Denies fever    Current Outpatient Medications   Medication Sig Dispense Refill    DULoxetine 60 MG Oral Cap DR Particles Take 2 capsules (120 mg total) by mouth daily. 180 capsule 0    allopurinol 300 MG Oral Tab Take 1.5 pill daily 135 tablet 3    levothyroxine 75 MCG Oral Tab Take 1 tablet (75 mcg total) by mouth before breakfast. 90 tablet 3    rosuvastatin 20 MG Oral Tab Take 1 tablet (20 mg total) by mouth nightly. 90 tablet 3    metoprolol tartrate 50 MG Oral Tab Take 0.5 tablets (25 mg total) by mouth daily. 45 tablet 3    colchicine 0.6 MG Oral Tab Take 1 tablet (0.6 mg total) by mouth daily. 90 tablet 1    NUCALA 100 MG/ML Subcutaneous Solution Auto-injector       clindamycin 150 MG Oral Cap Take 4 capsules (600 mg total) by mouth as needed. 1 HOUR BEFORE DENTAL APPT      mometasone 0.1 % External Ointment Thin layer to affected area nightly for 4 weeks, then every other night x 4 weeks, twice weekly for 4 weeks. See gynecologist for re-assessment. 45 g 3    Budesonide-Formoterol Fumarate 160-4.5 MCG/ACT Inhalation Aerosol Inhale 2 puffs into the lungs 2 (two) times daily.      Albuterol Sulfate  (90 Base) MCG/ACT Inhalation Aero Soln Inhale 2 puffs into the lungs every 4 (four) hours as needed for Wheezing or Shortness of Breath. 1 Inhaler 3    Tiotropium Bromide Monohydrate (SPIRIVA RESPIMAT) 1.25 MCG/ACT Inhalation Aero Soln Inhale 2 sprays into the lungs daily. 3 Inhaler 2    aspirin 81 MG Oral Chew Tab Chew 1 tablet (81 mg total) by mouth every morning.      Omega-3 Fatty Acids (OMEGA 3 OR) Take 1,000 mg by mouth 2 (two) times daily.         No current facility-administered medications for this visit.      Past  Medical History[1]   Past Surgical History[2]   Family History[3]   Short Social Hx on File[4]      REVIEW OF SYSTEMS:   GENERAL: feels well otherwise, as above  HEENT: congested, as above in HPI, voice lost, had ruptured eardrum last month post flight, COVID positive last month, recovered  LUNGS: denies shortness of breath with exertion, mild cough, last Albuterol yesterday afternoon  CARDIOVASCULAR: denies chest pain on exertion  GI: no nausea or abdominal pain, appetite normal  NEURO: denies current headaches    EXAM:   /70   Pulse 57   Temp 98 °F (36.7 °C) (Oral)   Resp 18   Wt 178 lb (80.7 kg)   LMP  (LMP Unknown)   SpO2 96%   BMI 28.73 kg/m²   GENERAL: well developed, well nourished,in no apparent distress, voice very hoarse  HEENT: atraumatic, normocephalic,ears clear, nares with minimal mucus, throat pink and clear. Uvuvla midline.  No sinus tenderness with palpation.  NECK: supple,no adenopathy  LUNGS: clear to auscultation all lobes  CARDIO: RRR without murmur  Results for orders placed or performed in visit on 06/05/25   Strep A Assay W/Optic    Collection Time: 06/05/25  1:51 PM   Result Value Ref Range    Strep Grp A Screen negative Negative    Control Line Present with a clear background (yes/no) yes Yes/No    Kit Lot # [id number] Numeric    Kit Expiration Date 5/9/26 Date         ASSESSMENT AND PLAN:    PLAN:Sarah was seen today for sore throat.    Diagnoses and all orders for this visit:    Sore throat  -     Strep A Assay W/Optic    Laryngitis  -     predniSONE 20 MG Oral Tab; Take 2 tablets (40 mg total) by mouth daily for 5 days.    Moderate persistent asthma, unspecified whether complicated (HCC)  -     predniSONE 20 MG Oral Tab; Take 2 tablets (40 mg total) by mouth daily for 5 days.      Negative rapid strep. Prednisone script to hold if symptoms worsen due to asthma. Self care discussed. Medication use and risk/benefit discussed. Patient is advised to follow up with PCP if not  improving with treatment plan or seek immediate care if symptoms worsen.  The patient indicates understanding of these issues and agrees to the plan.  Patient Instructions   Use saline nasal spray to nose 3-4 times daily, then use salt water gargle to rinse out mucus  Continue Claritin for allergies. Continue inhalers and use rescue inhaler as needed.   Start Prednisone if symptoms worsen. Hot steam inhalation. Drink lots of fluids to keep mucus thin.  Hard candy, lozenges, soothing liquids for throat. Keep water and honey at the bedside for awakening  Wear a mask if need to be outdoors-even for quick runs to the store.  Follow up with PCP or pulmonology if symptoms worsen or seek urgent care if symptoms significantly worsen.                       [1]   Past Medical History:   Acute midline low back pain without sciatica    Acute postoperative pain of right shoulder    Asthma (HCC)    Atherosclerosis of aorta    CXR - 3/14/19    Basal cell carcinoma (BCC)    Nose    BRCA1 negative    BRCA2 negative    Cancer screening    [employer] Common Hereditary Cancers Panel = Negative for 47 genes.     Cellulitis of hand, left    Chondromalacia of patella    Chronic right shoulder pain    Colon polyps    scope    COVID    recved IV treatment , NO Residual effects.  ASTHMA FLAIR, COUGH FEVERS. RESOLVED    Decreased GFR    GFR 58 - 10/18/18     Depression    resolved, takes Duloxetine for hot flashes     Dyslipidemia    Endometriosis    Very painful periods. Was suspected to have endometriosis.     Endometriosis    Focal endometriosis on path from hysterectomy 6/2022. At time of surgery, findings consistent with scarring from probable stage 3/4 endometriosis.     Eosinophilic asthma (HCC)    Essential hypertension    Fall    Family history of heart disease    Ganglion of right wrist    OR by     Head injury    High blood pressure    High cholesterol    HTN (hypertension)    Hyperlipidemia    Hypothyroidism    Inguinal  hernia    OR    Lichen sclerosus of vulva    biopsy proven    Menopausal symptoms    Menopause    age 46.     Nontraumatic complete tear of right rotator cuff    Nontraumatic rupture of right long head biceps tendon    Obesity    Obesity (BMI 30-39.9)    MELISSA (obstructive sleep apnea)    AHI-7    Pap smear for cervical cancer screening    Pap negative     Plantar fasciitis    left    Prediabetes    Right knee meniscal tear    OR-[facility]    SAH (subarachnoid hemorrhage) (HCC)    Serous cystadenoma of right ovary    BSO performed. Pelvic washings benign    CHERELLE (stress urinary incontinence, female)    Had urogyn eval with Dr. Wagoner. Was told she holds her bladder a lot & that she is not a sling candidate.     Tendonitis of ankle, left   [2]   Past Surgical History:  Procedure Laterality Date    Arthroscopy of joint unlisted Right     shoulder     Biopsy vulva/perineum,addnl lesn Bilateral 07/25/2022    Lichen Sclerosus of vulva. Two sites on vulva biopsied. Dr. Chari Spencer    Colonoscopy  2002    Colonoscopy  6/28/2013    Procedure: COLONOSCOPY;  Surgeon: Mary Ann Candelaria MD;  Location: [facility]    Excis primary ganglion wrist      on right by Fernie    Hc endometrial sampling w or wo endocerv sampling  03/07/2022    EMB done for borderline thickened endometrium, complex ovarian cyst. Sounded 6.5 cm. Path Insufficient. Dr. Chari Spencer     Hernia surgery  1998    right inguinal    Hernia surgery  12/03/2021    ROBOTIC ASSISTED REPAIR OF UMBILICAL HERNIA WITH MESH    Hysterectomy  06/24/2022    Robot assisted total laparoscopic hysterectomy, bilateral salpingo-oophorectomy, pelvic washings, cystoscopy, lysis of adhesions, and bilateral ureterolysis. Path: Benign serous cystadenoma of right ovary. Focal endometriosis. Dr. Chari Spencer, [facility].     Knee arthroscopy  2009    right knee torn meniscus    Oophorectomy Bilateral 06/24/2022    right ovary - benign serous cystadenoma    Other  1989    ?  burning/cutting of cervix after abnormal pap. Approx date. Exact procedure not known.     Salpingectomy Bilateral 2022    at time of hysterectomy    Skin surgery  10/26/2020    BCC Left nasal ala mohs by Dr Wang   [3]   Family History  Problem Relation Age of Onset    Heart Disease Brother     High Blood Pressure Brother     Cancer Brother 67        2 cancerous growths removed arm    Other (heart diseases) Brother     Heart Disease Brother     High Blood Pressure Brother     Cancer Brother 67        scalp cancerous growth removed    Other (skin cancer) Brother     Diabetes Father     Other (Other) Father     Breast Cancer Mother 60    Heart Disease Mother     Cancer Mother         lung    Breast Cancer 2nd occurrence Mother 65        had 4 occurrences of breast cancer    Cancer Paternal Grandfather         lung    Heart Attack Maternal Grandfather     Heart Disease Maternal Grandmother     Heart Disease Brother 49        stents    High Blood Pressure Brother     Other (Other) Son         vein surgery legs    Breast Cancer Maternal Cousin Female 50        No genetic testing.     Ovarian Cancer Neg     Colon Cancer Neg     Pancreatic Cancer Neg     Melanoma Neg     Uterine Cancer Neg    [4]   Social History  Socioeconomic History    Marital status:      Spouse name: Sean    Number of children: 2   Occupational History     Employer: [employer]   Tobacco Use    Smoking status: Former     Current packs/day: 0.00     Average packs/day: 1.5 packs/day for 15.6 years (23.4 ttl pk-yrs)     Types: Cigarettes     Start date: 1970     Quit date: 1985     Years since quittin.8     Passive exposure: Never    Smokeless tobacco: Former   Vaping Use    Vaping status: Never Used   Substance and Sexual Activity    Alcohol use: Yes     Comment: monthly     Drug use: No    Sexual activity: Not Currently     Partners: Male     Birth control/protection: Hysterectomy   Other Topics Concern    Caffeine Concern Yes      Comment: 1 cup of coffee    Exercise Yes     Comment: walking

## 2025-06-05 NOTE — PATIENT INSTRUCTIONS
Use saline nasal spray to nose 3-4 times daily, then use salt water gargle to rinse out mucus  Continue Claritin for allergies. Continue inhalers and use rescue inhaler as needed.   Start Prednisone if symptoms worsen. Hot steam inhalation. Drink lots of fluids to keep mucus thin.  Hard candy, lozenges, soothing liquids for throat. Keep water and honey at the bedside for awakening  Wear a mask if need to be outdoors-even for quick runs to the store.  Follow up with PCP or pulmonology if symptoms worsen or seek urgent care if symptoms significantly worsen.

## 2025-06-26 DIAGNOSIS — R23.2 HOT FLASHES: ICD-10-CM

## 2025-06-26 DIAGNOSIS — G89.29 CHRONIC JOINT PAIN: ICD-10-CM

## 2025-06-26 DIAGNOSIS — M25.50 CHRONIC JOINT PAIN: ICD-10-CM

## 2025-06-26 DIAGNOSIS — E03.9 HYPOTHYROIDISM, UNSPECIFIED TYPE: ICD-10-CM

## 2025-06-30 RX ORDER — LEVOTHYROXINE SODIUM 75 UG/1
75 TABLET ORAL
Qty: 90 TABLET | Refills: 3 | Status: SHIPPED | OUTPATIENT
Start: 2025-06-30

## 2025-06-30 NOTE — TELEPHONE ENCOUNTER
Patient : please call patient to assist with scheduling appointment with Primary Care Provider    Protocol failed for appointment only  30 day refill given on 06/30/25, appointment needed for further refills.    Please review pended refill request as unable to refill due to High / Very High drug interaction or warning copied here:    High  High Dose: DULoxetine, 120 mg, Oral, Daily  Single dose of 120 mg exceeds recommended maximum of 60 mg by 100%  Daily dose of 120 mg exceeds recommended maximum of 60 mg by 100%    Requested Prescriptions   Pending Prescriptions Disp Refills    LEVOTHYROXINE 75 MCG Oral Tab [Pharmacy Med Name: LEVOTHYROXINE 0.075MG (75MCG) TABS] 90 tablet 3     Sig: TAKE 1 TABLET(75 MCG) BY MOUTH BEFORE BREAKFAST       Thyroid Medication Protocol Passed - 6/30/2025  3:31 PM       DULOXETINE 60 MG Oral Cap DR Particles [Pharmacy Med Name: DULOXETINE DR 60MG CAPSULES] 60 capsule 0     Sig: TAKE 2 CAPSULES(120 MG) BY MOUTH DAILY       Psychiatric Non-Scheduled (Anti-Anxiety) Failed - 6/30/2025  3:31 PM        Failed - In person appointment or virtual visit in the past 6 mos or appointment in next 3 mos        Passed - Depression Screening completed within the past 12 months        Passed - Medication is active on med list

## 2025-07-01 RX ORDER — DULOXETIN HYDROCHLORIDE 60 MG/1
120 CAPSULE, DELAYED RELEASE ORAL DAILY
Qty: 60 CAPSULE | Refills: 0 | Status: SHIPPED | OUTPATIENT
Start: 2025-07-01

## 2025-07-08 ENCOUNTER — OFFICE VISIT (OUTPATIENT)
Dept: RHEUMATOLOGY | Facility: CLINIC | Age: 70
End: 2025-07-08
Payer: MEDICARE

## 2025-07-08 VITALS
BODY MASS INDEX: 28.63 KG/M2 | WEIGHT: 178.13 LBS | RESPIRATION RATE: 16 BRPM | TEMPERATURE: 97 F | DIASTOLIC BLOOD PRESSURE: 80 MMHG | HEART RATE: 68 BPM | SYSTOLIC BLOOD PRESSURE: 136 MMHG | HEIGHT: 66 IN | OXYGEN SATURATION: 94 %

## 2025-07-08 DIAGNOSIS — M25.541 ARTHRALGIA OF BOTH HANDS: ICD-10-CM

## 2025-07-08 DIAGNOSIS — M1A.00X1 IDIOPATHIC CHRONIC GOUT WITH TOPHUS, UNSPECIFIED SITE: Primary | ICD-10-CM

## 2025-07-08 DIAGNOSIS — M15.4 EROSIVE OSTEOARTHRITIS OF HAND: ICD-10-CM

## 2025-07-08 DIAGNOSIS — M25.542 ARTHRALGIA OF BOTH HANDS: ICD-10-CM

## 2025-07-08 PROBLEM — R55 SYNCOPE AND COLLAPSE: Status: ACTIVE | Noted: 2024-08-30

## 2025-07-08 PROBLEM — N76.3 CHRONIC VULVITIS: Status: RESOLVED | Noted: 2022-03-07 | Resolved: 2025-07-08

## 2025-07-08 PROCEDURE — 1159F MED LIST DOCD IN RCRD: CPT | Performed by: INTERNAL MEDICINE

## 2025-07-08 PROCEDURE — 99214 OFFICE O/P EST MOD 30 MIN: CPT | Performed by: INTERNAL MEDICINE

## 2025-07-08 PROCEDURE — 1160F RVW MEDS BY RX/DR IN RCRD: CPT | Performed by: INTERNAL MEDICINE
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
## 2025-07-09 ENCOUNTER — TELEPHONE (OUTPATIENT)
Dept: INTERNAL MEDICINE CLINIC | Facility: CLINIC | Age: 70
End: 2025-07-09

## 2025-07-09 DIAGNOSIS — Z00.00 ENCOUNTER FOR MEDICARE ANNUAL WELLNESS EXAM: Primary | ICD-10-CM

## 2025-07-09 DIAGNOSIS — I10 ESSENTIAL HYPERTENSION: ICD-10-CM

## 2025-07-09 NOTE — TELEPHONE ENCOUNTER
Patient called request labs prior to their annual physical.  Annual physical scheduled for   Future Appointments   Date Time Provider Department Center   9/16/2025  4:00 PM Kristal Valdez MD [facility] [facility]    .   Please order labs. Patient preferred lab is [facility]  Patient informed request was sent to clinical team.  Patient informed to fast for labs.  No callback required.

## 2025-07-16 NOTE — TELEPHONE ENCOUNTER
Patient is aware and scheduled for Friday.    Placed fasting lab orders to [facility] lab per protocol.

## 2025-07-21 ENCOUNTER — OFFICE VISIT (OUTPATIENT)
Dept: INTERNAL MEDICINE CLINIC | Facility: CLINIC | Age: 70
End: 2025-07-21
Payer: MEDICARE

## 2025-07-21 VITALS
TEMPERATURE: 99 F | BODY MASS INDEX: 29.09 KG/M2 | RESPIRATION RATE: 16 BRPM | SYSTOLIC BLOOD PRESSURE: 120 MMHG | OXYGEN SATURATION: 96 % | HEART RATE: 72 BPM | HEIGHT: 66 IN | DIASTOLIC BLOOD PRESSURE: 76 MMHG | WEIGHT: 181 LBS

## 2025-07-21 DIAGNOSIS — Z12.31 ENCOUNTER FOR SCREENING MAMMOGRAM FOR MALIGNANT NEOPLASM OF BREAST: ICD-10-CM

## 2025-07-21 DIAGNOSIS — H93.11 TINNITUS OF RIGHT EAR: Primary | ICD-10-CM

## 2025-07-21 DIAGNOSIS — J82.83 EOSINOPHILIC ASTHMA (HCC): ICD-10-CM

## 2025-07-21 PROCEDURE — 99214 OFFICE O/P EST MOD 30 MIN: CPT

## 2025-07-21 PROCEDURE — G2211 COMPLEX E/M VISIT ADD ON: HCPCS

## 2025-07-21 PROCEDURE — 1159F MED LIST DOCD IN RCRD: CPT
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
## 2025-08-08 ENCOUNTER — HOSPITAL ENCOUNTER (OUTPATIENT)
Dept: MAMMOGRAPHY | Age: 70
Discharge: HOME | End: 2025-08-08

## 2025-08-08 DIAGNOSIS — Z12.31 ENCOUNTER FOR SCREENING MAMMOGRAM FOR MALIGNANT NEOPLASM OF BREAST: ICD-10-CM

## 2025-08-08 PROCEDURE — 77067 SCR MAMMO BI INCL CAD: CPT

## 2025-08-08 PROCEDURE — 77063 BREAST TOMOSYNTHESIS BI: CPT

## 2025-08-28 ENCOUNTER — HOSPITAL ENCOUNTER (OUTPATIENT)
Dept: MAMMOGRAPHY | Facility: HOSPITAL | Age: 70
Discharge: HOME | End: 2025-08-28

## 2025-08-28 DIAGNOSIS — R92.2 INCONCLUSIVE MAMMOGRAM: ICD-10-CM

## 2025-08-28 PROCEDURE — 77061 BREAST TOMOSYNTHESIS UNI: CPT

## 2025-08-28 PROCEDURE — 76642 ULTRASOUND BREAST LIMITED: CPT

## 2025-08-28 PROCEDURE — 77065 DX MAMMO INCL CAD UNI: CPT

## (undated) DIAGNOSIS — R93.89 THICKENED ENDOMETRIUM: ICD-10-CM

## (undated) DIAGNOSIS — N83.201 OVARIAN CYST, RIGHT: Primary | ICD-10-CM

## (undated) DIAGNOSIS — Z01.812 ENCOUNTER FOR PREPROCEDURE SCREENING LABORATORY TESTING FOR COVID-19: ICD-10-CM

## (undated) DIAGNOSIS — C44.42 SQUAMOUS CELL CARCINOMA, SCALP/NECK: Primary | ICD-10-CM

## (undated) DIAGNOSIS — Z20.822 ENCOUNTER FOR PREPROCEDURE SCREENING LABORATORY TESTING FOR COVID-19: ICD-10-CM

## (undated) DIAGNOSIS — N76.3 CHRONIC VULVITIS: ICD-10-CM

## (undated) DIAGNOSIS — I10 ESSENTIAL HYPERTENSION: ICD-10-CM

## (undated) DEVICE — DRAPE,TOP,102X53,STERILE: Brand: MEDLINE

## (undated) DEVICE — ARISTA AH ABSORBABLE HEMOSTATIC PARTICLES: Brand: ARISTA™ AH

## (undated) DEVICE — GYN LAP/ROBOTIC: Brand: MEDLINE INDUSTRIES, INC.

## (undated) DEVICE — LIGHT HANDLE

## (undated) DEVICE — [HIGH FLOW INSUFFLATOR,  DO NOT USE IF PACKAGE IS DAMAGED,  KEEP DRY,  KEEP AWAY FROM SUNLIGHT,  PROTECT FROM HEAT AND RADIOACTIVE SOURCES.]: Brand: PNEUMOSURE

## (undated) DEVICE — VISUALIZATION SYSTEM: Brand: CLEARIFY

## (undated) DEVICE — SLEEVE KENDALL SCD EXPRESS MED

## (undated) DEVICE — DELINEATOR UTERINE MANIPULATOR

## (undated) DEVICE — SOL H2O IV

## (undated) DEVICE — SUTURE VLOC 90 2-0 9" 2145

## (undated) DEVICE — PERMANENT CAUTERY HOOK: Brand: ENDOWRIST

## (undated) DEVICE — ANCHOR TISSUE RETRIEVAL SYSTEM, BAG SIZE 175 ML, PORT SIZE 10 MM: Brand: ANCHOR TISSUE RETRIEVAL SYSTEM

## (undated) DEVICE — ARISTA AH FLEXITIP XL APPLICATOR: Brand: ARISTA™ AH FLEXITIP™ XL

## (undated) DEVICE — BAG URINE URIMETER

## (undated) DEVICE — TUBING CYSTO

## (undated) DEVICE — STERILE POLYISOPRENE POWDER-FREE SURGICAL GLOVES: Brand: PROTEXIS

## (undated) DEVICE — TROCAR: Brand: KII FIOS FIRST ENTRY

## (undated) DEVICE — 40580 - THE PINK PAD - ADVANCED TRENDELENBURG POSITIONING KIT: Brand: 40580 - THE PINK PAD - ADVANCED TRENDELENBURG POSITIONING KIT

## (undated) DEVICE — ARM DRAPE

## (undated) DEVICE — PLUMEPORT ACTIV LAPAROSCOPIC SMOKE FILTRATION DEVICE: Brand: PLUMEPORT ACTIVE

## (undated) DEVICE — VESSEL SEALER EXTEND: Brand: ENDOWRIST

## (undated) DEVICE — BLADELESS OBTURATOR: Brand: WECK VISTA

## (undated) DEVICE — SUT MONOCRYL 4-0 PS-2 Y496G

## (undated) DEVICE — CANNULA SEAL

## (undated) DEVICE — COLUMN DRAPE

## (undated) DEVICE — SPECIMEN TRAP

## (undated) DEVICE — CLOSURE EXOFIN 1.0ML

## (undated) DEVICE — ANCHOR TISSUE RETRIEVAL SYSTEM, BAG SIZE 125 ML, PORT SIZE 8 MM: Brand: ANCHOR TISSUE RETRIEVAL SYSTEM

## (undated) DEVICE — BAG DRAIN INFECTION CNTRL 2000

## (undated) DEVICE — SOL .9 100ML

## (undated) DEVICE — TUBING MEGADYNE LAPAROSCOPIC

## (undated) DEVICE — MEGA SUTURECUT ND: Brand: ENDOWRIST

## (undated) NOTE — LETTER
03/04/20        [address] [id number]      Dear Crystal Aguilar,    Our records indicate that you have outstanding lab work and or testing that was ordered for you and has not yet been completed:  Orders Placed This Encounter

## (undated) NOTE — LETTER
[address]  [address] [place] [address]  [address]  [place] [address]  [facility]: [phone]  FAX: [phone]   Consent to Procedure/Sedation    Date: __6/3/2019_____    Time: ___10:06 AM ___    1.  I authorize the performance ___________________________    Signature of person authorized to consent for patient: Relationship to patient:  ___________________________    ___________________    Witness: [facility]_________________   Date: _6/3/19 56 am_____________    Print

## (undated) NOTE — LETTER
09/18/21        [address] [id number]      Dear Jackie Piña,    Our records indicate that you have outstanding lab work and or testing that was ordered for you and has not yet been completed:  Orders Placed This Encounter

## (undated) NOTE — ED AVS SNAPSHOT
Mrs. Samia Garrett   MRN: [id number]    Department:  [facility] Emergency Department   Date of Visit:  10/29/2018           Disclosure     Insurance plans vary and the physician(s) referred by the ER may not be covered by your plan.  Please cont tell this physician (or your personal doctor if your instructions are to return to your personal doctor) about any new or lasting problems. The primary care or specialist physician will see patients referred from the [facility] Emergency Department.  Maeve Pineda

## (undated) NOTE — MR AVS SNAPSHOT
[facility] [address] [address] [phone]               Thank you for choosing us for your health care visit with Ventura Steen MD.  We are glad to serve you and happy to provide you with this summar Metoprolol Tartrate 50 MG Tabs   TAKE 1/2 TABLET BY MOUTH DAILY   Commonly known as:  LOPRESSOR           naproxen 500 MG Tabs   Take 1 tablet (500 mg total) by mouth 2 (two) times daily with meals.    Commonly known as:  NAPROSYN           OMEGA 3 OR   Ta Fully enjoy your food when eating. Don’t eat while distracted and slow down. Avoid over sized portions. Don’t eat while when you’re bored.      EAT THESE FOODS MORE OFTEN: EAT THESE FOODS LESS OFTEN:   Make half your plate fruits and vegetables Highly

## (undated) NOTE — LETTER
ASTHMA ACTION PLAN for Danielle Lisa     : 1955     Date: 2023  Provider:  Dagmar Aldridge MD  Phone for doctor or clinic: [facility], [address] [address] [facility], [id number] [phone]    ACT Score: 8      You can use the colors of a traffic light to help learn about your asthma medicines. 1. Green - Go! % of Personal Best Peak Flow Use controller medicine. Breathing is good  No cough or wheeze  Can work and play Medicine How much to take When to take it    Budesonide-Formoterol Fumarate 160-4.5 MCG/ACT Inhalation Aerosol       2. Yellow - Caution. 50-79% Personal Best Peak  Flow. Use reliever medicine to keep an asthma attack from getting bad. Cough  Wheezing  Tight Chest  Wake up at night Medicine How much to take When to take it    Albuterol Sulfate  (90 Base) MCG/ACT Inhalation Aero Soln        Additional instructions         3. Red - Stop! Danger!  <50% Personal Best Peak  Flow. Take these medications until  Get help from a doctor   Medicine not helping  Breathing is hard and fast  Nose opens wide  Can't walk  Ribs show  Can't talk well Medicine How much to take When to take it    Go to the nearest Emergency Room/Department right now! Additional Instructions If your symptoms do not improve and you cannot contact your doctor, [facility] or call 911 immediately! [x] Asthma Action Plan reviewed with patient (and caregiver if necessary) and a copy of the plan was given to the patient/caregiver. [] Asthma Action Plan reviewed with patient (and caregiver if necessary) on the phone and mailed copy to patient or submitted via [address]      Signatures:  Provider  Dagmar Aldridge MD   Patient Caretaker

## (undated) NOTE — Clinical Note
I had the pleasure of seeing Ms. Randell Valentin in my office today. Please see my attached note.     Cris Lott

## (undated) NOTE — LETTER
Date: 7/12/2021    Patient Name: Lyn Avelar          To Whom it may concern:     Patient is medically cleared for dental procedures. Please call if any questions.         Sincerely,    Maurisio Guerrier MD

## (undated) NOTE — LETTER
To:     Date:  6/21/2022  Fax #: ***    Patient Name: Martha Boogie / Sex: 1/17/1955-A: 69 y  female  Phone:  ***  CSN: [id number]      Medical Records: [id number]    ANTIBIOTIC ALLERGY/SENSITIVITY/CONTRAINDICATION    Surgeon: Bette Hanks):  Yanet Nunez MD  Procedure: Robot assisted total laparoscopic hysterectomy, bilateral salpingo-oophorectomy, pelvic washings, cystoscopy, possible laparotomy  Anesthesia Type: General    The above patient has an allergy/sensitivity or contraindication to the antibiotic ordered from your standing orders/[facility] Pre-op Standing orders/[facility] Adult Preoperative Prophylactic Protocol listed below. If you would like the medication given, please fax this form back indicating the override reason with a physician signature/date/and time.         ___  Benefit outweighs risk  ___  Insignificant   ___  Low risk    ___ Not a true allergy   ___  Dose appropriate   ___  Tolerated regimen in the past     If you prefer to order an alternate antibiotic please list drug, dose, route and time to administer below:     _____________________________________  _______    ___________   __________________  Antibiotic                                                                                        Dose                 Route                        Time of administration      ________________________________________Date____________Time________  (MD signature)  Fax this form back to [phone]

## (undated) NOTE — LETTER
24    Patient: Sarah Mejia  : 1955 Visit date: 2024    Dear  Kristal Valdez MD    Thank you for referring Sarah Mejia to my practice.  Please find my assessment and plan below.        Good evening.  Thank you for the referral.  I saw Sarah my clinic last month for varicose veins.  On physical exam, Sarah has large varicosities throughout her right leg with swelling.  I do believe Sarah is suffering from venous insufficiency.  We will obtain an ultrasound to demonstrate reflux.  I also discussed with Sarah conservative treatment including compression socks, elevating her legs, and walking.  I will have Sarah follow-up in 6 weeks for review of her ultrasound and development of treatment plan.  Thank you once again for the referral and let me know if you have any questions.    Sincerely,       Maci Michael MD

## (undated) NOTE — ED AVS SNAPSHOT
Emiliano Jamil   MRN: [id number]    Department:  [facility] Emergency Department   Date of Visit:  5/24/2019           Disclosure     Insurance plans vary and the physician(s) referred by the ER may not be covered by your plan.  Please contact yo tell this physician (or your personal doctor if your instructions are to return to your personal doctor) about any new or lasting problems. The primary care or specialist physician will see patients referred from the [facility] Emergency Department.  Liza Cruz

## (undated) NOTE — LETTER
9/19/2019  [address] [id number]  1/17/1955  [id number]      Dear Nehemias Page,      My office staff  have made several attempts to contact you at my request.  It is in my best judgment for your health and well-bein

## (undated) NOTE — LETTER
Your patient was recently seen at the [facility] for a hospital follow-up visit. The visit note is attached. Please contact the clinic with any questions at [phone].     Thank you,  NURA Sam

## (undated) NOTE — Clinical Note
02/02/2017    [address] [id number]    Dear [place],    Dr. Amrik Soares refilled your prescription a few days ago. She asked that you be reminded to do the labs that she ordered last November.   Future refills depend on

## (undated) NOTE — LETTER
ASTHMA ACTION PLAN for Vinh Lyon     : 1955     Date: 2019  Provider:  Meng Tiwari MD  Phone for doctor or clinic: [facility] the [facility], [address] [facility], [address] [place] [address] [phone]